# Patient Record
Sex: MALE | Race: WHITE | Employment: FULL TIME | URBAN - METROPOLITAN AREA
[De-identification: names, ages, dates, MRNs, and addresses within clinical notes are randomized per-mention and may not be internally consistent; named-entity substitution may affect disease eponyms.]

---

## 2017-04-04 ENCOUNTER — HOSPITAL ENCOUNTER (EMERGENCY)
Age: 42
Discharge: HOME | End: 2017-04-04
Attending: EMERGENCY MEDICINE
Payer: COMMERCIAL

## 2017-04-04 VITALS
DIASTOLIC BLOOD PRESSURE: 88 MMHG | RESPIRATION RATE: 17 BRPM | HEIGHT: 74 IN | WEIGHT: 240 LBS | OXYGEN SATURATION: 99 % | TEMPERATURE: 98 F | BODY MASS INDEX: 30.8 KG/M2 | SYSTOLIC BLOOD PRESSURE: 156 MMHG

## 2017-04-04 DIAGNOSIS — S00.85XA: Primary | ICD-10-CM

## 2017-04-04 PROCEDURE — 90715 TDAP VACCINE 7 YRS/> IM: CPT | Performed by: PHYSICIAN ASSISTANT

## 2017-04-04 PROCEDURE — 74011000250 HC RX REV CODE- 250: Performed by: PHYSICIAN ASSISTANT

## 2017-04-04 PROCEDURE — 90471 IMMUNIZATION ADMIN: CPT

## 2017-04-04 PROCEDURE — 99283 EMERGENCY DEPT VISIT LOW MDM: CPT

## 2017-04-04 PROCEDURE — 74011250637 HC RX REV CODE- 250/637: Performed by: PHYSICIAN ASSISTANT

## 2017-04-04 PROCEDURE — 74011250636 HC RX REV CODE- 250/636: Performed by: PHYSICIAN ASSISTANT

## 2017-04-04 RX ORDER — OXYCODONE AND ACETAMINOPHEN 5; 325 MG/1; MG/1
1 TABLET ORAL
Status: COMPLETED | OUTPATIENT
Start: 2017-04-04 | End: 2017-04-04

## 2017-04-04 RX ORDER — LIDOCAINE HYDROCHLORIDE AND EPINEPHRINE 10; 10 MG/ML; UG/ML
1.5 INJECTION, SOLUTION INFILTRATION; PERINEURAL
Status: COMPLETED | OUTPATIENT
Start: 2017-04-04 | End: 2017-04-04

## 2017-04-04 RX ORDER — CEPHALEXIN 250 MG/1
500 CAPSULE ORAL
Status: COMPLETED | OUTPATIENT
Start: 2017-04-04 | End: 2017-04-04

## 2017-04-04 RX ORDER — CEPHALEXIN 500 MG/1
500 CAPSULE ORAL 4 TIMES DAILY
Qty: 28 CAP | Refills: 0 | Status: SHIPPED | OUTPATIENT
Start: 2017-04-04 | End: 2017-04-11

## 2017-04-04 RX ADMIN — OXYCODONE HYDROCHLORIDE AND ACETAMINOPHEN 1 TABLET: 5; 325 TABLET ORAL at 19:47

## 2017-04-04 RX ADMIN — TETANUS TOXOID, REDUCED DIPHTHERIA TOXOID AND ACELLULAR PERTUSSIS VACCINE, ADSORBED 0.5 ML: 5; 2.5; 8; 8; 2.5 SUSPENSION INTRAMUSCULAR at 20:10

## 2017-04-04 RX ADMIN — LIDOCAINE HYDROCHLORIDE AND EPINEPHRINE 15 MG: 10; 10 INJECTION, SOLUTION INFILTRATION; PERINEURAL at 19:35

## 2017-04-04 RX ADMIN — CEPHALEXIN 500 MG: 250 CAPSULE ORAL at 19:47

## 2017-04-04 NOTE — ED PROVIDER NOTES
HPI Comments: Zahra Wakefield is a 39 y.o. male who presents ambulatory to ER with c/o fishhook to right cheek x PTA. Pt states he was fishing when he was trying to yank the treble hook fishhook from the bushes when it jerked back and got stuck in the right side of his face. States unable to remove it on his own. No other complaints. Td not UTD. PCP: No primary care provider on file. PMHx significant for: No past medical history on file. PSHx significant for: No past surgical history on file. No Known Allergies    There are no other complaints, changes or physical findings at this time. The history is provided by the patient. No past medical history on file. No past surgical history on file. No family history on file. Social History     Social History    Marital status: SINGLE     Spouse name: N/A    Number of children: N/A    Years of education: N/A     Occupational History    Not on file. Social History Main Topics    Smoking status: Not on file    Smokeless tobacco: Not on file    Alcohol use Not on file    Drug use: Not on file    Sexual activity: Not on file     Other Topics Concern    Not on file     Social History Narrative         ALLERGIES: Review of patient's allergies indicates no known allergies. Review of Systems   Constitutional: Negative. HENT: Negative. Eyes: Negative. Respiratory: Negative. Cardiovascular: Negative. Gastrointestinal: Negative. Genitourinary: Negative. Musculoskeletal: Negative. Skin:        FB to face   Neurological: Negative. Hematological: Negative. Psychiatric/Behavioral: Negative. All other systems reviewed and are negative. Vitals:    04/04/17 1930   BP: 156/88   Resp: 17   Temp: 98 °F (36.7 °C)   SpO2: 99%   Weight: 108.9 kg (240 lb)   Height: 6' 2\" (1.88 m)            Physical Exam   Constitutional: He is oriented to person, place, and time. He appears well-developed and well-nourished. No distress. HENT:   Head: Normocephalic and atraumatic. Neck: Normal range of motion. Cardiovascular: Intact distal pulses and normal pulses. Musculoskeletal: Normal range of motion. He exhibits no edema or tenderness. Neurological: He is alert and oriented to person, place, and time. He has normal strength. No sensory deficit. Skin: Skin is warm and dry. No rash noted. He is not diaphoretic. No erythema. No pallor. Psychiatric: He has a normal mood and affect. His behavior is normal.   Nursing note and vitals reviewed. MDM  Number of Diagnoses or Management Options  Diagnosis management comments: DDx: FB lodged, fishhook in face       Amount and/or Complexity of Data Reviewed  Discuss the patient with other providers: yes (Dr. Mary Xie)    Patient Progress  Patient progress: stable    ED Course       Procedures            7:44 PM   Rajinder Gutiérrez PA-C discussed patient with Genny Miller MD who is in agreement with care plan as outlined. No further recommendations. Rajinder Gutiérrez PA-C      Procedure Note - Removal Cutaneous Foreign Body:   7:45 PM  Performed by: Rajinder Gutiérrez PA-C   Complexity: simple  Skin prepped with safclens. Sterile field established. Anesthesia achieved with 3 mLs of Lidocaine 1% with epinephrine using a local infiltration. FB visualized just under skin located on right cheek was removed with wire cutters and traction. FB was removed in one piece without difficulty. Wound probed and irrigated. dressing applied. The procedure took 1-15 minutes, and pt tolerated well.      8:16 PM  Pt has been reevaluated. There are no new complaints, changes, or physical findings at this time. Medications have been reviewed w/ pt and/or family. Pt and/or family's questions have been answered. Pt and/or family expressed good understanding of the dx/tx/rx and is in agreement with plan of care.  Pt instructed and agreed to f/u w/ PCP and to return to ED upon further deterioration. Pt is ready for discharge. LABORATORY TESTS:  No results found for this or any previous visit (from the past 12 hour(s)). IMAGING RESULTS:  No orders to display     No results found. MEDICATIONS GIVEN:  Medications   lidocaine-EPINEPHrine (XYLOCAINE) 1 %-1:100,000 injection 15 mg (15 mg IntraDERMal Given by Provider 4/4/17 1935)   diph,Pertuss(AC),Tet Vac-PF (BOOSTRIX) suspension 0.5 mL (0.5 mL IntraMUSCular Given 4/4/17 2010)   cephALEXin (KEFLEX) capsule 500 mg (500 mg Oral Given 4/4/17 1947)   oxyCODONE-acetaminophen (PERCOCET) 5-325 mg per tablet 1 Tab (1 Tab Oral Given 4/4/17 1947)       IMPRESSION:  1. Foreign body of face, superficial, initial encounter        PLAN:  1. Current Discharge Medication List      START taking these medications    Details   cephALEXin (KEFLEX) 500 mg capsule Take 1 Cap by mouth four (4) times daily for 7 days. Qty: 28 Cap, Refills: 0           2.    Follow-up Information     Follow up With Details Comments [address] [facility] Schedule an appointment as soon as possible for a visit in 3 days  [address]    [facility] EMERGENCY DEPT  If symptoms worsen [address]  [phone]          Return to ED if worse

## 2017-04-05 NOTE — DISCHARGE INSTRUCTIONS
We hope that we have addressed all of your medical concerns. The examination and treatment you received in the Emergency Department were for an emergent problem and were not intended as complete care. It is important that you follow up with your healthcare provider(s) for ongoing care. If your symptoms worsen or do not improve as expected, and you are unable to reach your usual health care provider(s), you should return to the Emergency Department. Today's healthcare is undergoing tremendous change, and patient satisfaction surveys are one of the many tools to assess the quality of medical care. You may receive a survey from the [employer] regarding your experience in the Emergency Department. I hope that your experience has been completely positive, particularly the medical care that I provided. As such, please participate in the survey; anything less than excellent does not meet my expectations or intentions. [facility] and [address] [place] [address] participate in nationally recognized quality of care measures. If your blood pressure is greater than 120/80, as reported below, we urge that you seek medical care to address the potential of high blood pressure, commonly known as hypertension. Hypertension can be hereditary or can be caused by certain medical conditions, pain, stress, or \"white coat syndrome. \"       Please make an appointment with your health care provider(s) for follow up of your Emergency Department visit. VITALS:   Patient Vitals for the past 8 hrs:   Temp Resp BP SpO2   04/04/17 1930 98 °F (36.7 °C) 17 156/88 99 %          Thank you for allowing us to provide you with medical care today. We realize that you have many choices for your emergency care needs. Please choose us in the future for any continued health care needs. Tish Bae, [address] [place] [address] [address]   Office: [phone]            No results found for this or any previous visit (from the past 24 hour(s)). No results found. Object in the Skin: Care Instructions  Your Care Instructions  Small objects (splinters) of wood, metal, glass, or plastic can become embedded in the skin. Thorns from [employer] and other plants also can prick or become stuck in the skin. Splinters can cause an infection if they are not removed. Your doctor probably removed the object and cleaned the skin well. Your doctor may have given you antibiotics to prevent infection and a tetanus shot if you had not had one in the last 5 years or do not know when you had your last one. For a few days, you may have pain and itching in the wound where the object was removed. Follow-up care is a key part of your treatment and safety. Be sure to make and go to all appointments, and call your doctor if you are having problems. It's also a good idea to know your test results and keep a list of the medicines you take. How can you care for yourself at home? · If your doctor told you how to care for your wound, follow your doctor's instructions. If you did not get instructions, follow this general advice:  ¨ Wash the wound with clean water 2 times a day. Don't use hydrogen peroxide or alcohol, which can slow healing. ¨ You may cover the wound with a thin layer of petroleum jelly, such as Vaseline, and a nonstick bandage. ¨ Apply more petroleum jelly and replace the bandage as needed. · Your doctor may have used medicine to numb your skin. When it wears off, your pain may return. Take an over-the-counter pain medicine, such as acetaminophen (Tylenol), ibuprofen (Advil, Motrin), or naproxen (Aleve). Read and follow all instructions on the label. · Do not take two or more pain medicines at the same time unless the doctor told you to. Many pain medicines have acetaminophen, which is Tylenol. Too much acetaminophen (Tylenol) can be harmful.   · If your doctor prescribed antibiotics, take them as directed. Do not stop taking them just because you feel better. You need to take the full course of antibiotics. · After 2 or 3 days, if your swelling is gone, apply a heating pad set on low or a warm cloth to your wound area. Some doctors suggest that you go back and forth between hot and cold. Put a thin cloth between the heating pad and your skin. · It may help to prop up the affected part of your body on a pillow anytime you sit or lie down during the next 3 days. Try to keep it above the level of your heart. This will help reduce swelling. · Your wound may itch or feel irritated. A little redness and swelling is normal. Do not scratch or rub the wound. When should you call for help? Call your doctor now or seek immediate medical care if:  · The skin near the wound is cool or pale or changes color. · You have tingling, weakness, or numbness in the area near the wound. · The wound starts to bleed, and blood soaks the bandage. Oozing small amounts of blood is normal.  · You have trouble moving a limb near the wound. · You have signs of infection, such as:  ¨ Increased pain, swelling, warmth, or redness. ¨ Red streaks leading from the wound. ¨ Pus draining from the wound. ¨ A fever. Watch closely for changes in your health, and be sure to contact your doctor if:  · You do not get better as expected. Where can you learn more? Go to http://john-paul.info/. Enter Karuna Rubin in the search box to learn more about \"Object in the Skin: Care Instructions. \"  Current as of: May 27, 2016  Content Version: 11.2  © 1967-4719 [employer]. Care instructions adapted under license by [employer] (which disclaims liability or warranty for this information).  If you have questions about a medical condition or this instruction, always ask your healthcare professional. [address] any warranty or liability for your use of this information.

## 2019-03-28 ENCOUNTER — ED HISTORICAL/CONVERTED ENCOUNTER (OUTPATIENT)
Dept: OTHER | Age: 44
End: 2019-03-28

## 2023-04-16 ENCOUNTER — HOSPITAL ENCOUNTER (EMERGENCY)
Age: 48
Discharge: HOME | End: 2023-04-16
Payer: MEDICAID

## 2023-04-16 VITALS
TEMPERATURE: 97.7 F | HEART RATE: 80 BPM | WEIGHT: 298 LBS | HEIGHT: 74 IN | RESPIRATION RATE: 17 BRPM | DIASTOLIC BLOOD PRESSURE: 77 MMHG | OXYGEN SATURATION: 96 % | BODY MASS INDEX: 38.24 KG/M2 | SYSTOLIC BLOOD PRESSURE: 130 MMHG

## 2023-04-16 DIAGNOSIS — L03.115 CELLULITIS OF RIGHT LOWER EXTREMITY: Primary | ICD-10-CM

## 2023-04-16 LAB
ANION GAP SERPL CALC-SCNC: 5 MMOL/L (ref 5–15)
BASOPHILS # BLD: 0 K/UL (ref 0–0.1)
BASOPHILS NFR BLD: 0 % (ref 0–1)
BUN SERPL-MCNC: 14 MG/DL (ref 6–20)
BUN/CREAT SERPL: 13 (ref 12–20)
CA-I BLD-MCNC: 8.9 MG/DL (ref 8.5–10.1)
CHLORIDE SERPL-SCNC: 107 MMOL/L (ref 97–108)
CO2 SERPL-SCNC: 24 MMOL/L (ref 21–32)
CREAT SERPL-MCNC: 1.11 MG/DL (ref 0.7–1.3)
DIFFERENTIAL METHOD BLD: ABNORMAL
EOSINOPHIL # BLD: 0.1 K/UL (ref 0–0.4)
EOSINOPHIL NFR BLD: 1 % (ref 0–7)
ERYTHROCYTE [DISTWIDTH] IN BLOOD BY AUTOMATED COUNT: 13.4 % (ref 11.5–14.5)
GLUCOSE SERPL-MCNC: 109 MG/DL (ref 65–100)
HCT VFR BLD AUTO: 44.3 % (ref 36.6–50.3)
HGB BLD-MCNC: 15 G/DL (ref 12.1–17)
IMM GRANULOCYTES # BLD AUTO: 0.1 K/UL (ref 0–0.04)
IMM GRANULOCYTES NFR BLD AUTO: 1 % (ref 0–0.5)
LYMPHOCYTES # BLD: 1.9 K/UL (ref 0.8–3.5)
LYMPHOCYTES NFR BLD: 28 % (ref 12–49)
MCH RBC QN AUTO: 30.5 PG (ref 26–34)
MCHC RBC AUTO-ENTMCNC: 33.9 G/DL (ref 30–36.5)
MCV RBC AUTO: 90.2 FL (ref 80–99)
MONOCYTES # BLD: 0.6 K/UL (ref 0–1)
MONOCYTES NFR BLD: 9 % (ref 5–13)
NEUTS SEG # BLD: 4.2 K/UL (ref 1.8–8)
NEUTS SEG NFR BLD: 61 % (ref 32–75)
NRBC # BLD: 0 K/UL (ref 0–0.01)
NRBC BLD-RTO: 0 PER 100 WBC
PLATELET # BLD AUTO: 207 K/UL (ref 150–400)
PMV BLD AUTO: 9.6 FL (ref 8.9–12.9)
POTASSIUM SERPL-SCNC: 3.9 MMOL/L (ref 3.5–5.1)
RBC # BLD AUTO: 4.91 M/UL (ref 4.1–5.7)
SODIUM SERPL-SCNC: 136 MMOL/L (ref 136–145)
WBC # BLD AUTO: 6.8 K/UL (ref 4.1–11.1)

## 2023-04-16 PROCEDURE — 99283 EMERGENCY DEPT VISIT LOW MDM: CPT

## 2023-04-16 PROCEDURE — 36415 COLL VENOUS BLD VENIPUNCTURE: CPT

## 2023-04-16 PROCEDURE — 74011250637 HC RX REV CODE- 250/637: Performed by: NURSE PRACTITIONER

## 2023-04-16 PROCEDURE — 85025 COMPLETE CBC W/AUTO DIFF WBC: CPT

## 2023-04-16 PROCEDURE — 80048 BASIC METABOLIC PNL TOTAL CA: CPT

## 2023-04-16 RX ORDER — IBUPROFEN 800 MG/1
800 TABLET ORAL
Status: COMPLETED | OUTPATIENT
Start: 2023-04-16 | End: 2023-04-16

## 2023-04-16 RX ORDER — IBUPROFEN 800 MG/1
800 TABLET ORAL
Qty: 20 TABLET | Refills: 0 | Status: SHIPPED | OUTPATIENT
Start: 2023-04-16 | End: 2023-04-23

## 2023-04-16 RX ADMIN — IBUPROFEN 800 MG: 800 TABLET, FILM COATED ORAL at 21:51

## 2023-04-17 NOTE — DISCHARGE INSTRUCTIONS
Your blood work does not show any signs of infection. I recommend you continue your antibiotics previously prescribed. Follow-up with your primary care doctor for wound recheck and further evaluation of leg swelling and possible venous stasis   Thank you! Thank you for allowing me to care for you in the emergency department. It is my goal to provide you with excellent care. If you have not received excellent quality care, please ask to speak to the nurse manager. Please fill out the survey that will come to you by mail or email since we listen to your feedback! Below you will find a list of your tests from today's visit. Should you have any questions, please do not hesitate to call the emergency department. Labs  Recent Results (from the past 12 hour(s))   CBC WITH AUTOMATED DIFF    Collection Time: 04/16/23  9:10 PM   Result Value Ref Range    WBC 6.8 4.1 - 11.1 K/uL    RBC 4.91 4.10 - 5.70 M/uL    HGB 15.0 12.1 - 17.0 g/dL    HCT 44.3 36.6 - 50.3 %    MCV 90.2 80.0 - 99.0 FL    MCH 30.5 26.0 - 34.0 PG    MCHC 33.9 30.0 - 36.5 g/dL    RDW 13.4 11.5 - 14.5 %    PLATELET 948 158 - 774 K/uL    MPV 9.6 8.9 - 12.9 FL    NRBC 0.0 0.0  WBC    ABSOLUTE NRBC 0.00 0.00 - 0.01 K/uL    NEUTROPHILS 61 32 - 75 %    LYMPHOCYTES 28 12 - 49 %    MONOCYTES 9 5 - 13 %    EOSINOPHILS 1 0 - 7 %    BASOPHILS 0 0 - 1 %    IMMATURE GRANULOCYTES 1 (H) 0 - 0.5 %    ABS. NEUTROPHILS 4.2 1.8 - 8.0 K/UL    ABS. LYMPHOCYTES 1.9 0.8 - 3.5 K/UL    ABS. MONOCYTES 0.6 0.0 - 1.0 K/UL    ABS. EOSINOPHILS 0.1 0.0 - 0.4 K/UL    ABS. BASOPHILS 0.0 0.0 - 0.1 K/UL    ABS. IMM.  GRANS. 0.1 (H) 0.00 - 0.04 K/UL    DF AUTOMATED     METABOLIC PANEL, BASIC    Collection Time: 04/16/23  9:10 PM   Result Value Ref Range    Sodium 136 136 - 145 mmol/L    Potassium 3.9 3.5 - 5.1 mmol/L    Chloride 107 97 - 108 mmol/L    CO2 24 21 - 32 mmol/L    Anion gap 5 5 - 15 mmol/L    Glucose 109 (H) 65 - 100 mg/dL    BUN 14 6 - 20 mg/dL    Creatinine 1.11 0.70 - 1.30 mg/dL    BUN/Creatinine ratio 13 12 - 20      eGFR >60 >60 ml/min/1.73m2    Calcium 8.9 8.5 - 10.1 mg/dL       Radiologic Studies  No orders to display     CT Results  (Last 48 hours)      None          CXR Results  (Last 48 hours)      None          ------------------------------------------------------------------------------------------------------------  The exam and treatment you received in the Emergency Department were for an urgent problem and are not intended as complete care. It is important that you follow-up with a doctor, nurse practitioner, or physician assistant to:  (1) confirm your diagnosis,  (2) re-evaluation of changes in your illness and treatment, and  (3) for ongoing care. Please take your discharge instructions with you when you go to your follow-up appointment. If you have any problem arranging a follow-up appointment, contact the Emergency Department. If your symptoms become worse or you do not improve as expected and you are unable to reach your health care provider, please return to the Emergency Department. We are available 24 hours a day. If a prescription has been provided, please have it filled as soon as possible to prevent a delay in treatment. If you have any questions or reservations about taking the medication due to side effects or interactions with other medications, please call your primary care provider or contact the ER.

## 2023-04-22 NOTE — ED PROVIDER NOTES
[facility] EMERGENCY DEPT  EMERGENCY DEPARTMENT HISTORY AND PHYSICAL EXAM      Date: 4/16/2023  Patient Name: Sisi Tate  MRN: [id number]  [place]: 1975  Date of evaluation: 4/16/2023  Provider: Poppy Floyd NP   Note Started: 6:09 PM 4/22/23    HISTORY OF PRESENT ILLNESS     Chief Complaint   Patient presents with    Ankle swelling       History Provided By: Patient    HPI: Sisi Tate is a 50 y.o. male with no significant past medical history presents to the emergency room with CC of  skin infection. PAST MEDICAL HISTORY   Past Medical History:  No past medical history on file. Past Surgical History:  No past surgical history on file. Family History:  No family history on file. Social History:  Social History     Tobacco Use    Smoking status: Never   Substance Use Topics    Alcohol use: No       Allergies:  No Known Allergies    PCP: None    Current Meds:   Discharge Medication List as of 4/16/2023  9:50 PM          PHYSICAL EXAM     ED Triage Vitals   ED Encounter Vitals Group      BP 04/16/23 1949 124/82      Pulse (Heart Rate) 04/16/23 1949 80      Resp Rate 04/16/23 1949 17      Temp 04/16/23 1949 97.7 °F (36.5 °C)      Temp src --       O2 Sat (%) 04/16/23 1949 96 %      Weight 04/16/23 1949 298 lb      Height 04/16/23 1949 6' 2\"      Physical Exam  ***    SCREENINGS              LAB, EKG AND DIAGNOSTIC RESULTS   Labs:  No results found for this or any previous visit (from the past 12 hour(s)). EKG: Initial EKG interpreted by me. {EKG Smartlist:34589}    Radiologic Studies:  Non-plain film images such as CT, Ultrasound and MRI are read by the radiologist. Plain radiographic images are visualized and preliminarily interpreted by the ED Physician with the following findings: {Wet Read Interpretation:53058}    Interpretation per the Radiologist below, if available at the time of this note:  No results found. PROCEDURES   Unless otherwise noted below, none.   Procedures      CRITICAL CARE TIME   {Critical Care Smartlist:09290}    ED COURSE and DIFFERENTIAL DIAGNOSIS/MDM   CC/HPI/PE Summary, DDx: ***    Records Reviewed (source and summary of external notes): Prior medical records and Nursing notes    Vitals:    Vitals:    04/16/23 1949 04/16/23 1949 04/16/23 2152   BP:  124/82 130/77   Pulse:  80    Resp:  17    Temp:  97.7 °F (36.5 °C)    SpO2:  96%    Weight: 135.2 kg (298 lb)     Height: 6' 2\" (1.88 m)          ED COURSE  ED Course as of 04/22/23 1809   Sun Apr 16, 2023 2126 CBC normal, no leukocytosis or anemia [LP]   2138 BMP stable. [LP]      ED Course User Index  [LP] Giovanna Benjamin NP       Disposition Considerations (Tests not done, Shared Decision Making, Pt Expectation of Test or Treatment.): {Dispo consideration:96203}    Patient was given the following medications:  Medications   ibuprofen (MOTRIN) tablet 800 mg (800 mg Oral Given 4/16/23 2151)       CONSULTS: (Who and What was discussed)  None     Social Determinants affecting Dx or Tx: {Social Barriers:13580}    Smoking Cessation: {smoking cessation smartlist:27000}    FINAL IMPRESSION     1. Cellulitis of right lower extremity          DISPOSITION/PLAN   Discharged    {Disposition:84298}     PATIENT REFERRED TO:  Follow-up Information       Follow up With Specialties Details Why [address] [place] [address] [address]  Schedule an appointment as soon as possible for a visit  OR your PCP, For wound re-check [address] [place] [address]  [phone]              DISCHARGE MEDICATIONS:  Discharge Medication List as of 4/16/2023  9:50 PM            DISCONTINUED MEDICATIONS:  Discharge Medication List as of 4/16/2023  9:50 PM          I am the Primary Clinician of Record: Karen Varela NP (electronically signed)    (Please note that parts of this dictation were completed with voice recognition software.  Quite often unanticipated grammatical, syntax, homophones, and other interpretive errors are inadvertently transcribed by the computer software. Please disregards these errors.  Please excuse any errors that have escaped final proofreading.)

## 2023-05-17 ENCOUNTER — APPOINTMENT (OUTPATIENT)
Facility: HOSPITAL | Age: 48
End: 2023-05-17
Payer: MEDICAID

## 2023-05-17 ENCOUNTER — HOSPITAL ENCOUNTER (EMERGENCY)
Facility: HOSPITAL | Age: 48
Discharge: HOME | End: 2023-05-17
Attending: EMERGENCY MEDICINE
Payer: MEDICAID

## 2023-05-17 VITALS
BODY MASS INDEX: 38.63 KG/M2 | RESPIRATION RATE: 15 BRPM | OXYGEN SATURATION: 98 % | HEART RATE: 77 BPM | SYSTOLIC BLOOD PRESSURE: 150 MMHG | TEMPERATURE: 98 F | DIASTOLIC BLOOD PRESSURE: 103 MMHG | WEIGHT: 301 LBS | HEIGHT: 74 IN

## 2023-05-17 DIAGNOSIS — M79.89 LEG SWELLING: ICD-10-CM

## 2023-05-17 DIAGNOSIS — R07.89 OTHER CHEST PAIN: Primary | ICD-10-CM

## 2023-05-17 LAB
ALBUMIN SERPL-MCNC: 4.1 G/DL (ref 3.5–5)
ALBUMIN/GLOB SERPL: 1.1 (ref 1.1–2.2)
ALP SERPL-CCNC: 85 U/L (ref 45–117)
ALT SERPL-CCNC: 43 U/L (ref 12–78)
ANION GAP SERPL CALC-SCNC: 4 MMOL/L (ref 5–15)
AST SERPL W P-5'-P-CCNC: 18 U/L (ref 15–37)
BASOPHILS # BLD: 0 K/UL (ref 0–0.1)
BASOPHILS NFR BLD: 1 % (ref 0–1)
BILIRUB SERPL-MCNC: 0.9 MG/DL (ref 0.2–1)
BNP SERPL-MCNC: 17 PG/ML
BUN SERPL-MCNC: 15 MG/DL (ref 6–20)
BUN/CREAT SERPL: 18 (ref 12–20)
CA-I BLD-MCNC: 8.9 MG/DL (ref 8.5–10.1)
CHLORIDE SERPL-SCNC: 108 MMOL/L (ref 97–108)
CO2 SERPL-SCNC: 26 MMOL/L (ref 21–32)
CREAT SERPL-MCNC: 0.84 MG/DL (ref 0.7–1.3)
DIFFERENTIAL METHOD BLD: ABNORMAL
EKG ATRIAL RATE: 78 BPM
EKG DIAGNOSIS: NORMAL
EKG P AXIS: 22 DEGREES
EKG P-R INTERVAL: 150 MS
EKG Q-T INTERVAL: 364 MS
EKG QRS DURATION: 84 MS
EKG QTC CALCULATION (BAZETT): 414 MS
EKG R AXIS: -5 DEGREES
EKG T AXIS: 29 DEGREES
EKG VENTRICULAR RATE: 78 BPM
EOSINOPHIL # BLD: 0.1 K/UL (ref 0–0.4)
EOSINOPHIL NFR BLD: 1 % (ref 0–7)
ERYTHROCYTE [DISTWIDTH] IN BLOOD BY AUTOMATED COUNT: 13.6 % (ref 11.5–14.5)
GLOBULIN SER CALC-MCNC: 3.9 G/DL (ref 2–4)
GLUCOSE SERPL-MCNC: 98 MG/DL (ref 65–100)
HCT VFR BLD AUTO: 45.6 % (ref 36.6–50.3)
HGB BLD-MCNC: 15.7 G/DL (ref 12.1–17)
IMM GRANULOCYTES # BLD AUTO: 0.1 K/UL (ref 0–0.04)
IMM GRANULOCYTES NFR BLD AUTO: 1 % (ref 0–0.5)
LYMPHOCYTES # BLD: 1.9 K/UL (ref 0.8–3.5)
LYMPHOCYTES NFR BLD: 29 % (ref 12–49)
MCH RBC QN AUTO: 30.9 PG (ref 26–34)
MCHC RBC AUTO-ENTMCNC: 34.4 G/DL (ref 30–36.5)
MCV RBC AUTO: 89.8 FL (ref 80–99)
MONOCYTES # BLD: 0.6 K/UL (ref 0–1)
MONOCYTES NFR BLD: 9 % (ref 5–13)
NEUTS SEG # BLD: 4 K/UL (ref 1.8–8)
NEUTS SEG NFR BLD: 59 % (ref 32–75)
NRBC # BLD: 0 K/UL (ref 0–0.01)
NRBC BLD-RTO: 0 PER 100 WBC
PLATELET # BLD AUTO: 215 K/UL (ref 150–400)
PMV BLD AUTO: 9.6 FL (ref 8.9–12.9)
POTASSIUM SERPL-SCNC: 4.2 MMOL/L (ref 3.5–5.1)
PROT SERPL-MCNC: 8 G/DL (ref 6.4–8.2)
RBC # BLD AUTO: 5.08 M/UL (ref 4.1–5.7)
SODIUM SERPL-SCNC: 138 MMOL/L (ref 136–145)
TROPONIN I SERPL HS-MCNC: 5 NG/L (ref 0–76)
WBC # BLD AUTO: 6.6 K/UL (ref 4.1–11.1)

## 2023-05-17 PROCEDURE — 99285 EMERGENCY DEPT VISIT HI MDM: CPT

## 2023-05-17 PROCEDURE — 84484 ASSAY OF TROPONIN QUANT: CPT

## 2023-05-17 PROCEDURE — 83880 ASSAY OF NATRIURETIC PEPTIDE: CPT

## 2023-05-17 PROCEDURE — 36415 COLL VENOUS BLD VENIPUNCTURE: CPT

## 2023-05-17 PROCEDURE — 71045 X-RAY EXAM CHEST 1 VIEW: CPT

## 2023-05-17 PROCEDURE — 85025 COMPLETE CBC W/AUTO DIFF WBC: CPT

## 2023-05-17 PROCEDURE — 93005 ELECTROCARDIOGRAM TRACING: CPT | Performed by: EMERGENCY MEDICINE

## 2023-05-17 PROCEDURE — 80053 COMPREHEN METABOLIC PANEL: CPT

## 2023-05-17 ASSESSMENT — LIFESTYLE VARIABLES
HOW OFTEN DO YOU HAVE A DRINK CONTAINING ALCOHOL: MONTHLY OR LESS
HOW MANY STANDARD DRINKS CONTAINING ALCOHOL DO YOU HAVE ON A TYPICAL DAY: 3 OR 4

## 2023-05-17 ASSESSMENT — PAIN SCALES - GENERAL: PAINLEVEL_OUTOF10: 0

## 2023-05-17 NOTE — ED TRIAGE NOTES
Pt states he's been having off and on chest pains, none currently, for a few days. Increased edema to bilateral lower legs that he states is new.

## 2023-05-24 NOTE — ED PROVIDER NOTES
for discharge home. The signs, symptoms, diagnosis, and discharge instructions have been discussed, understanding conveyed, and agreed upon. The patient is to follow up as recommended or return to ER should their symptoms worsen. PATIENT REFERRED TO:  Leslie Soni MD  [address] [place] [address]    Schedule an appointment as soon as possible for a visit           DISCHARGE MEDICATIONS:     Medication List      You have not been prescribed any medications. DISCONTINUED MEDICATIONS:  There are no discharge medications for this patient. I am the Primary Clinician of Record: Tosin Nielson MD (electronically signed)    (Please note that parts of this dictation were completed with voice recognition software. Quite often unanticipated grammatical, syntax, homophones, and other interpretive errors are inadvertently transcribed by the computer software. Please disregards these errors.  Please excuse any errors that have escaped final proofreading.)     Tosin Nielson MD  05/24/23 8642

## 2023-08-09 ENCOUNTER — HOSPITAL ENCOUNTER (EMERGENCY)
Facility: HOSPITAL | Age: 48
Discharge: HOME | End: 2023-08-09
Attending: EMERGENCY MEDICINE
Payer: MEDICAID

## 2023-08-09 VITALS
TEMPERATURE: 98.3 F | RESPIRATION RATE: 18 BRPM | DIASTOLIC BLOOD PRESSURE: 89 MMHG | WEIGHT: 290 LBS | BODY MASS INDEX: 37.22 KG/M2 | SYSTOLIC BLOOD PRESSURE: 157 MMHG | HEART RATE: 80 BPM | HEIGHT: 74 IN | OXYGEN SATURATION: 96 %

## 2023-08-09 DIAGNOSIS — K42.9 UMBILICAL HERNIA WITHOUT OBSTRUCTION AND WITHOUT GANGRENE: Primary | ICD-10-CM

## 2023-08-09 LAB
ALBUMIN SERPL-MCNC: 4.1 G/DL (ref 3.5–5)
ALBUMIN/GLOB SERPL: 1 (ref 1.1–2.2)
ALP SERPL-CCNC: 92 U/L (ref 45–117)
ALT SERPL-CCNC: 57 U/L (ref 12–78)
ANION GAP SERPL CALC-SCNC: 4 MMOL/L (ref 5–15)
AST SERPL W P-5'-P-CCNC: 23 U/L (ref 15–37)
BASOPHILS # BLD: 0 K/UL (ref 0–0.1)
BASOPHILS NFR BLD: 1 % (ref 0–1)
BILIRUB SERPL-MCNC: 1 MG/DL (ref 0.2–1)
BUN SERPL-MCNC: 15 MG/DL (ref 6–20)
BUN/CREAT SERPL: 16 (ref 12–20)
CA-I BLD-MCNC: 9.1 MG/DL (ref 8.5–10.1)
CHLORIDE SERPL-SCNC: 108 MMOL/L (ref 97–108)
CO2 SERPL-SCNC: 26 MMOL/L (ref 21–32)
CREAT SERPL-MCNC: 0.94 MG/DL (ref 0.7–1.3)
DIFFERENTIAL METHOD BLD: ABNORMAL
EOSINOPHIL # BLD: 0.1 K/UL (ref 0–0.4)
EOSINOPHIL NFR BLD: 1 % (ref 0–7)
ERYTHROCYTE [DISTWIDTH] IN BLOOD BY AUTOMATED COUNT: 13.1 % (ref 11.5–14.5)
GLOBULIN SER CALC-MCNC: 4.2 G/DL (ref 2–4)
GLUCOSE SERPL-MCNC: 96 MG/DL (ref 65–100)
HCT VFR BLD AUTO: 47.3 % (ref 36.6–50.3)
HGB BLD-MCNC: 15.9 G/DL (ref 12.1–17)
IMM GRANULOCYTES # BLD AUTO: 0.1 K/UL (ref 0–0.04)
IMM GRANULOCYTES NFR BLD AUTO: 1 % (ref 0–0.5)
LYMPHOCYTES # BLD: 1.8 K/UL (ref 0.8–3.5)
LYMPHOCYTES NFR BLD: 22 % (ref 12–49)
MCH RBC QN AUTO: 30.3 PG (ref 26–34)
MCHC RBC AUTO-ENTMCNC: 33.6 G/DL (ref 30–36.5)
MCV RBC AUTO: 90.3 FL (ref 80–99)
MONOCYTES # BLD: 0.7 K/UL (ref 0–1)
MONOCYTES NFR BLD: 9 % (ref 5–13)
NEUTS SEG # BLD: 5.8 K/UL (ref 1.8–8)
NEUTS SEG NFR BLD: 66 % (ref 32–75)
NRBC # BLD: 0 K/UL (ref 0–0.01)
NRBC BLD-RTO: 0 PER 100 WBC
PLATELET # BLD AUTO: 223 K/UL (ref 150–400)
PMV BLD AUTO: 9.5 FL (ref 8.9–12.9)
POTASSIUM SERPL-SCNC: 4.1 MMOL/L (ref 3.5–5.1)
PROT SERPL-MCNC: 8.3 G/DL (ref 6.4–8.2)
RBC # BLD AUTO: 5.24 M/UL (ref 4.1–5.7)
SODIUM SERPL-SCNC: 138 MMOL/L (ref 136–145)
WBC # BLD AUTO: 8.5 K/UL (ref 4.1–11.1)

## 2023-08-09 PROCEDURE — 6360000002 HC RX W HCPCS: Performed by: EMERGENCY MEDICINE

## 2023-08-09 PROCEDURE — 80053 COMPREHEN METABOLIC PANEL: CPT

## 2023-08-09 PROCEDURE — 85025 COMPLETE CBC W/AUTO DIFF WBC: CPT

## 2023-08-09 PROCEDURE — 96374 THER/PROPH/DIAG INJ IV PUSH: CPT

## 2023-08-09 PROCEDURE — 36415 COLL VENOUS BLD VENIPUNCTURE: CPT

## 2023-08-09 PROCEDURE — 99284 EMERGENCY DEPT VISIT MOD MDM: CPT

## 2023-08-09 RX ORDER — NAPROXEN 500 MG/1
500 TABLET ORAL 2 TIMES DAILY
Qty: 60 TABLET | Refills: 0 | Status: SHIPPED | OUTPATIENT
Start: 2023-08-09

## 2023-08-09 RX ORDER — MIDAZOLAM HYDROCHLORIDE 2 MG/2ML
5 INJECTION, SOLUTION INTRAMUSCULAR; INTRAVENOUS ONCE
Status: COMPLETED | OUTPATIENT
Start: 2023-08-09 | End: 2023-08-09

## 2023-08-09 RX ADMIN — MIDAZOLAM HYDROCHLORIDE 5 MG: 1 INJECTION, SOLUTION INTRAMUSCULAR; INTRAVENOUS at 18:04

## 2023-08-09 ASSESSMENT — PAIN - FUNCTIONAL ASSESSMENT: PAIN_FUNCTIONAL_ASSESSMENT: 0-10

## 2023-08-09 NOTE — ED PROVIDER NOTES
[facility] EMERGENCY DEPT  EMERGENCY DEPARTMENT HISTORY AND PHYSICAL EXAM      Date: 8/9/2023  Patient Name: Patsy Bowen  MRN: [id number]  [address] [facility] 1975  Date of evaluation: 8/9/2023  Provider: Yanira Salazar MD   Note Started: 4:44 PM EDT 8/9/23    HISTORY OF PRESENT ILLNESS     Chief Complaint   Patient presents with    Abdominal Pain       History Provided By: Patient    HPI: Patsy Bowen is a 50 y.o. male who complains of periumbilical swelling and pain that began acutely yesterday. This is made worse with movement and deep inspiration. He denies any fever, chills, nausea, vomiting, chest pain, shortness of breath, rash, diarrhea. Says he has never had a hernia in the past and no abdominal surgeries in the past.    PAST MEDICAL HISTORY   Past Medical History:  History reviewed. No pertinent past medical history. Past Surgical History:  Past Surgical History:   Procedure Laterality Date    HAND RECONSTRUCTION Left        Family History:  History reviewed. No pertinent family history. Social History:  Social History     Tobacco Use    Smoking status: Never   Substance Use Topics    Alcohol use: No    Drug use: Never       Allergies:  No Known Allergies    PCP: No primary care provider on file. Current Meds:   No current facility-administered medications for this encounter.      Current Outpatient Medications   Medication Sig Dispense Refill    naproxen (NAPROSYN) 500 MG tablet Take 1 tablet by mouth 2 times daily 60 tablet 0       Social Determinants of Health:   Social Determinants of Health     Tobacco Use: Unknown    Smoking Tobacco Use: Never    Smokeless Tobacco Use: Unknown    Passive Exposure: Not on file   Alcohol Use: Not At Risk    Frequency of Alcohol Consumption: Monthly or less    Average Number of Drinks: 3 or 4    Frequency of Binge Drinking: Less than monthly   Financial Resource Strain: Not on file   Food Insecurity: Not on file   Transportation Needs: Not on file   Physical Activity:

## 2023-08-09 NOTE — ED TRIAGE NOTES
Ate pork ribs yesterday and then did not feel well - progressed to extreme pain and swelling to umbilical area. Swollen area to navel noted.

## 2023-08-24 ENCOUNTER — HOSPITAL ENCOUNTER (EMERGENCY)
Facility: HOSPITAL | Age: 48
Discharge: HOME | End: 2023-08-24
Attending: STUDENT IN AN ORGANIZED HEALTH CARE EDUCATION/TRAINING PROGRAM
Payer: MEDICAID

## 2023-08-24 VITALS
OXYGEN SATURATION: 96 % | HEIGHT: 74 IN | SYSTOLIC BLOOD PRESSURE: 126 MMHG | RESPIRATION RATE: 18 BRPM | DIASTOLIC BLOOD PRESSURE: 86 MMHG | HEART RATE: 76 BPM | BODY MASS INDEX: 36.57 KG/M2 | WEIGHT: 285 LBS | TEMPERATURE: 97.9 F

## 2023-08-24 DIAGNOSIS — K42.9 UMBILICAL HERNIA WITHOUT OBSTRUCTION AND WITHOUT GANGRENE: Primary | ICD-10-CM

## 2023-08-24 PROCEDURE — 99282 EMERGENCY DEPT VISIT SF MDM: CPT

## 2023-08-24 ASSESSMENT — PAIN - FUNCTIONAL ASSESSMENT: PAIN_FUNCTIONAL_ASSESSMENT: NONE - DENIES PAIN

## 2023-08-24 ASSESSMENT — LIFESTYLE VARIABLES
HOW OFTEN DO YOU HAVE A DRINK CONTAINING ALCOHOL: NEVER
HOW MANY STANDARD DRINKS CONTAINING ALCOHOL DO YOU HAVE ON A TYPICAL DAY: PATIENT DOES NOT DRINK

## 2023-08-24 NOTE — ED PROVIDER NOTES
distress. His abdominal exam did not reveal any hernia or any tenderness. We discussed outpatient follow-up with general surgery for medical clearance given that he is a  and would be at high risk of recurrence of hernia. He was given resources to establish care with general surgery as well as primary care. ED FINAL IMPRESSION     1. Umbilical hernia without obstruction and without gangrene          DISPOSITION/PLAN   DISPOSITION Decision To Discharge 08/24/2023 04:21:02 PM    Discharge Note: The patient is stable for discharge home. The signs, symptoms, diagnosis, and discharge instructions have been discussed, understanding conveyed, and agreed upon. The patient is to follow up as recommended or return to ER should their symptoms worsen. PATIENT REFERRED TO:  [facility] EMERGENCY DEPT  [address]  [phone]  Go to   As needed, If symptoms worsen    Ida Neither Gianna, DO  [address] [facility]  [phone]    Schedule an appointment as soon as possible for a visit in 3 days  For follow-up, For reevaluation    SARY Dangelo NP  [facility] [address]  [phone]    Call in 1 day          DISCHARGE MEDICATIONS:     Medication List        ASK your doctor about these medications      naproxen 500 MG tablet  Commonly known as: Naprosyn  Take 1 tablet by mouth 2 times daily                DISCONTINUED MEDICATIONS:  Discharge Medication List as of 8/24/2023  4:47 PM          I am the Primary Clinician of Record. Ena Gonzalez MD (electronically signed)    (Please note that parts of this dictation were completed with voice recognition software. Quite often unanticipated grammatical, syntax, homophones, and other interpretive errors are inadvertently transcribed by the computer software. Please disregards these errors.  Please excuse any errors that have escaped final proofreading.)

## 2023-08-24 NOTE — ED TRIAGE NOTES
States dx with hernia and was told to follow up with surgeon but was unable to. Wants to check up on it.

## 2023-08-24 NOTE — ED NOTES
Provider at bedside for dispo and follow up, all treatments completed as ordered. Discharge plan reviewed and paperwork signed, teaching completed regarding treatment received, medications and follow up care demonstrated and read back, pt walked out of the ER with steady gait and no signs of acute distress.        Alirio Park RN  08/24/23 0840

## 2023-08-28 ENCOUNTER — OFFICE VISIT (OUTPATIENT)
Age: 48
End: 2023-08-28
Payer: MEDICAID

## 2023-08-28 VITALS
DIASTOLIC BLOOD PRESSURE: 85 MMHG | WEIGHT: 310 LBS | HEIGHT: 74 IN | BODY MASS INDEX: 39.78 KG/M2 | RESPIRATION RATE: 18 BRPM | SYSTOLIC BLOOD PRESSURE: 135 MMHG

## 2023-08-28 DIAGNOSIS — K42.9 UMBILICAL HERNIA WITHOUT OBSTRUCTION AND WITHOUT GANGRENE: Primary | ICD-10-CM

## 2023-08-28 PROCEDURE — 99204 OFFICE O/P NEW MOD 45 MIN: CPT | Performed by: SURGERY

## 2023-08-28 NOTE — PROGRESS NOTES
Burning, cramping at umbilicus  No nausea or vomiting  Tender to palpation    Some heavy lifting with work  Was on light duty    10/25    [address] [facility] Gianna, DO  [address] [facility] [address], [address] [place] [address]  [phone]      Patient Name: Zoran Patton (50 y.o., male)    PCP: No primary care provider on file. History of Present Illness  Patient is a 50 y.o. male who presents to the office for evaluation of umbilical pain. He describes it as a burning, cramping pain. He has not noticed any bulging, but does state that it is tender to palpation. He does do intermittent heavy lifting at work. He has been on light duty since this pain began. He has not had any imaging done. History reviewed. No pertinent past medical history. Past Surgical History:   Procedure Laterality Date    ANTERIOR CRUCIATE LIGAMENT REPAIR Left 2000    HAND RECONSTRUCTION Left        Family History   Problem Relation Age of Onset    Cancer Father        Social History     Tobacco Use    Smoking status: Never   Substance Use Topics    Alcohol use: No    Drug use: Never       No Known Allergies    Prior to Visit Medications    Medication Sig Taking? Authorizing Provider   naproxen (NAPROSYN) 500 MG tablet Take 1 tablet by mouth 2 times daily Yes Himanshu Peña MD       Review of Systems   Constitutional:  Negative for chills, fever and unexpected weight change. HENT:  Negative for congestion, ear pain and sore throat. Eyes:  Negative for redness and visual disturbance. Respiratory:  Negative for cough, shortness of breath and wheezing. Cardiovascular:  Negative for chest pain and palpitations. Gastrointestinal:  Positive for abdominal pain. Negative for nausea and vomiting.        +umbilical pain   Genitourinary:  Negative for dysuria, frequency and hematuria. Musculoskeletal:  Negative for back pain, myalgias and neck pain. Skin:  Negative for rash and wound.    Neurological:

## 2023-09-04 ASSESSMENT — ENCOUNTER SYMPTOMS
VOMITING: 0
EYE REDNESS: 0
SORE THROAT: 0
WHEEZING: 0
SHORTNESS OF BREATH: 0
COUGH: 0
ABDOMINAL PAIN: 1
BACK PAIN: 0
NAUSEA: 0

## 2023-09-21 ENCOUNTER — TELEPHONE (OUTPATIENT)
Age: 48
End: 2023-09-21

## 2023-09-21 NOTE — TELEPHONE ENCOUNTER
Spoke to patient regarding scheduling for surgery on 10/25 with Dr. Carey Katz . Patient agreed. Informed patient that everything will be sent in surgical letter via MyChart and mail as well.

## 2023-09-25 ENCOUNTER — PREP FOR PROCEDURE (OUTPATIENT)
Facility: HOSPITAL | Age: 48
End: 2023-09-25

## 2023-09-25 RX ORDER — SODIUM CHLORIDE 0.9 % (FLUSH) 0.9 %
5-40 SYRINGE (ML) INJECTION PRN
Status: CANCELLED | OUTPATIENT
Start: 2023-09-25

## 2023-09-25 RX ORDER — ACETAMINOPHEN 500 MG
1000 TABLET ORAL ONCE
Status: CANCELLED | OUTPATIENT
Start: 2023-09-25 | End: 2023-09-25

## 2023-09-25 RX ORDER — SODIUM CHLORIDE, SODIUM LACTATE, POTASSIUM CHLORIDE, CALCIUM CHLORIDE 600; 310; 30; 20 MG/100ML; MG/100ML; MG/100ML; MG/100ML
INJECTION, SOLUTION INTRAVENOUS CONTINUOUS
Status: CANCELLED | OUTPATIENT
Start: 2023-09-25

## 2023-09-25 RX ORDER — PREGABALIN 100 MG/1
100 CAPSULE ORAL ONCE
Status: CANCELLED | OUTPATIENT
Start: 2023-09-25 | End: 2023-09-25

## 2023-09-25 RX ORDER — SODIUM CHLORIDE 0.9 % (FLUSH) 0.9 %
5-40 SYRINGE (ML) INJECTION EVERY 12 HOURS SCHEDULED
Status: CANCELLED | OUTPATIENT
Start: 2023-09-25

## 2023-09-25 RX ORDER — SODIUM CHLORIDE 9 MG/ML
INJECTION, SOLUTION INTRAVENOUS PRN
Status: CANCELLED | OUTPATIENT
Start: 2023-09-25

## 2023-09-25 RX ORDER — CELECOXIB 200 MG/1
200 CAPSULE ORAL ONCE
Status: CANCELLED | OUTPATIENT
Start: 2023-09-25 | End: 2023-09-25

## 2023-10-10 ENCOUNTER — APPOINTMENT (OUTPATIENT)
Facility: HOSPITAL | Age: 48
DRG: 446 | End: 2023-10-10
Payer: MEDICAID

## 2023-10-10 ENCOUNTER — APPOINTMENT (OUTPATIENT)
Facility: HOSPITAL | Age: 48
DRG: 446 | End: 2023-10-10
Attending: EMERGENCY MEDICINE
Payer: MEDICAID

## 2023-10-10 ENCOUNTER — HOSPITAL ENCOUNTER (INPATIENT)
Facility: HOSPITAL | Age: 48
LOS: 10 days | Discharge: HOME | DRG: 446 | End: 2023-10-20
Attending: EMERGENCY MEDICINE | Admitting: INTERNAL MEDICINE
Payer: MEDICAID

## 2023-10-10 DIAGNOSIS — D72.829 LEUKOCYTOSIS, UNSPECIFIED TYPE: Primary | ICD-10-CM

## 2023-10-10 DIAGNOSIS — K42.9 UMBILICAL HERNIA WITHOUT OBSTRUCTION AND WITHOUT GANGRENE: ICD-10-CM

## 2023-10-10 DIAGNOSIS — D49.4 BLADDER TUMOR: ICD-10-CM

## 2023-10-10 DIAGNOSIS — L03.115 CELLULITIS OF RIGHT LOWER EXTREMITY: ICD-10-CM

## 2023-10-10 DIAGNOSIS — I42.9 CARDIOMYOPATHY, UNSPECIFIED TYPE (HCC): ICD-10-CM

## 2023-10-10 DIAGNOSIS — R31.9 HEMATURIA, UNSPECIFIED TYPE: ICD-10-CM

## 2023-10-10 PROBLEM — R65.10 SIRS (SYSTEMIC INFLAMMATORY RESPONSE SYNDROME) (HCC): Status: ACTIVE | Noted: 2023-10-10

## 2023-10-10 LAB
ALBUMIN SERPL-MCNC: 3.9 G/DL (ref 3.5–5)
ALBUMIN/GLOB SERPL: 0.9 (ref 1.1–2.2)
ALP SERPL-CCNC: 86 U/L (ref 45–117)
ALT SERPL-CCNC: 70 U/L (ref 12–78)
ANION GAP SERPL CALC-SCNC: 9 MMOL/L (ref 5–15)
APPEARANCE UR: CLEAR
AST SERPL W P-5'-P-CCNC: 30 U/L (ref 15–37)
BACTERIA URNS QL MICRO: NEGATIVE /HPF
BASOPHILS # BLD: 0 K/UL (ref 0–0.1)
BASOPHILS NFR BLD: 0 % (ref 0–1)
BILIRUB SERPL-MCNC: 1.5 MG/DL (ref 0.2–1)
BILIRUB UR QL: NEGATIVE
BUN SERPL-MCNC: 15 MG/DL (ref 6–20)
BUN/CREAT SERPL: 13 (ref 12–20)
CA-I BLD-MCNC: 9 MG/DL (ref 8.5–10.1)
CHLORIDE SERPL-SCNC: 103 MMOL/L (ref 97–108)
CO2 SERPL-SCNC: 26 MMOL/L (ref 21–32)
COLOR UR: ABNORMAL
CREAT SERPL-MCNC: 1.15 MG/DL (ref 0.7–1.3)
DIFFERENTIAL METHOD BLD: ABNORMAL
EOSINOPHIL # BLD: 0 K/UL (ref 0–0.4)
EOSINOPHIL NFR BLD: 0 % (ref 0–7)
EPITH CASTS URNS QL MICRO: ABNORMAL /LPF
ERYTHROCYTE [DISTWIDTH] IN BLOOD BY AUTOMATED COUNT: 13.7 % (ref 11.5–14.5)
FLUAV AG NPH QL IA: NEGATIVE
FLUBV AG NOSE QL IA: NEGATIVE
GLOBULIN SER CALC-MCNC: 4.2 G/DL (ref 2–4)
GLUCOSE SERPL-MCNC: 96 MG/DL (ref 65–100)
GLUCOSE UR STRIP.AUTO-MCNC: NEGATIVE MG/DL
HCT VFR BLD AUTO: 44.2 % (ref 36.6–50.3)
HGB BLD-MCNC: 14.6 G/DL (ref 12.1–17)
HGB UR QL STRIP: ABNORMAL
IMM GRANULOCYTES # BLD AUTO: 0.2 K/UL (ref 0–0.04)
IMM GRANULOCYTES NFR BLD AUTO: 1 % (ref 0–0.5)
KETONES UR QL STRIP.AUTO: NEGATIVE MG/DL
LACTATE BLD-SCNC: 1.61 MMOL/L (ref 0.4–2)
LEUKOCYTE ESTERASE UR QL STRIP.AUTO: NEGATIVE
LYMPHOCYTES # BLD: 0.6 K/UL (ref 0.8–3.5)
LYMPHOCYTES NFR BLD: 3 % (ref 12–49)
MCH RBC QN AUTO: 30.5 PG (ref 26–34)
MCHC RBC AUTO-ENTMCNC: 33 G/DL (ref 30–36.5)
MCV RBC AUTO: 92.3 FL (ref 80–99)
MONOCYTES # BLD: 1 K/UL (ref 0–1)
MONOCYTES NFR BLD: 5 % (ref 5–13)
MUCOUS THREADS URNS QL MICRO: ABNORMAL /LPF
NEUTS SEG # BLD: 16.6 K/UL (ref 1.8–8)
NEUTS SEG NFR BLD: 91 % (ref 32–75)
NITRITE UR QL STRIP.AUTO: NEGATIVE
NRBC # BLD: 0 K/UL (ref 0–0.01)
NRBC BLD-RTO: 0 PER 100 WBC
PERFORMED BY:: NORMAL
PH UR STRIP: >8.5 [PH] (ref 5–8)
PLATELET # BLD AUTO: 189 K/UL (ref 150–400)
PMV BLD AUTO: 10.1 FL (ref 8.9–12.9)
POTASSIUM SERPL-SCNC: 3.9 MMOL/L (ref 3.5–5.1)
PROT SERPL-MCNC: 8.1 G/DL (ref 6.4–8.2)
PROT UR STRIP-MCNC: NEGATIVE MG/DL
RBC # BLD AUTO: 4.79 M/UL (ref 4.1–5.7)
RBC #/AREA URNS HPF: ABNORMAL /HPF (ref 0–5)
SARS-COV-2 RDRP RESP QL NAA+PROBE: NOT DETECTED
SODIUM SERPL-SCNC: 138 MMOL/L (ref 136–145)
SP GR UR REFRACTOMETRY: 1.01 (ref 1–1.03)
URINE CULTURE IF INDICATED: ABNORMAL
UROBILINOGEN UR QL STRIP.AUTO: 0.1 EU/DL (ref 0.1–1)
WBC # BLD AUTO: 18.4 K/UL (ref 4.1–11.1)
WBC URNS QL MICRO: ABNORMAL /HPF (ref 0–4)

## 2023-10-10 PROCEDURE — 87086 URINE CULTURE/COLONY COUNT: CPT

## 2023-10-10 PROCEDURE — 83605 ASSAY OF LACTIC ACID: CPT

## 2023-10-10 PROCEDURE — 6360000002 HC RX W HCPCS: Performed by: EMERGENCY MEDICINE

## 2023-10-10 PROCEDURE — 6360000002 HC RX W HCPCS: Performed by: STUDENT IN AN ORGANIZED HEALTH CARE EDUCATION/TRAINING PROGRAM

## 2023-10-10 PROCEDURE — 96375 TX/PRO/DX INJ NEW DRUG ADDON: CPT

## 2023-10-10 PROCEDURE — 87186 SC STD MICRODIL/AGAR DIL: CPT

## 2023-10-10 PROCEDURE — 6370000000 HC RX 637 (ALT 250 FOR IP): Performed by: STUDENT IN AN ORGANIZED HEALTH CARE EDUCATION/TRAINING PROGRAM

## 2023-10-10 PROCEDURE — 6370000000 HC RX 637 (ALT 250 FOR IP): Performed by: EMERGENCY MEDICINE

## 2023-10-10 PROCEDURE — 36415 COLL VENOUS BLD VENIPUNCTURE: CPT

## 2023-10-10 PROCEDURE — 81001 URINALYSIS AUTO W/SCOPE: CPT

## 2023-10-10 PROCEDURE — 74177 CT ABD & PELVIS W/CONTRAST: CPT

## 2023-10-10 PROCEDURE — 99285 EMERGENCY DEPT VISIT HI MDM: CPT

## 2023-10-10 PROCEDURE — 71045 X-RAY EXAM CHEST 1 VIEW: CPT

## 2023-10-10 PROCEDURE — 96361 HYDRATE IV INFUSION ADD-ON: CPT

## 2023-10-10 PROCEDURE — 93971 EXTREMITY STUDY: CPT

## 2023-10-10 PROCEDURE — 85025 COMPLETE CBC W/AUTO DIFF WBC: CPT

## 2023-10-10 PROCEDURE — 2580000003 HC RX 258: Performed by: STUDENT IN AN ORGANIZED HEALTH CARE EDUCATION/TRAINING PROGRAM

## 2023-10-10 PROCEDURE — 87077 CULTURE AEROBIC IDENTIFY: CPT

## 2023-10-10 PROCEDURE — 2580000003 HC RX 258: Performed by: EMERGENCY MEDICINE

## 2023-10-10 PROCEDURE — 87804 INFLUENZA ASSAY W/OPTIC: CPT

## 2023-10-10 PROCEDURE — 96365 THER/PROPH/DIAG IV INF INIT: CPT

## 2023-10-10 PROCEDURE — 87635 SARS-COV-2 COVID-19 AMP PRB: CPT

## 2023-10-10 PROCEDURE — 6360000004 HC RX CONTRAST MEDICATION: Performed by: EMERGENCY MEDICINE

## 2023-10-10 PROCEDURE — 87040 BLOOD CULTURE FOR BACTERIA: CPT

## 2023-10-10 PROCEDURE — 80053 COMPREHEN METABOLIC PANEL: CPT

## 2023-10-10 PROCEDURE — 1100000000 HC RM PRIVATE

## 2023-10-10 RX ORDER — ONDANSETRON 4 MG/1
4 TABLET, ORALLY DISINTEGRATING ORAL EVERY 8 HOURS PRN
Status: DISCONTINUED | OUTPATIENT
Start: 2023-10-10 | End: 2023-10-20 | Stop reason: HOSPADM

## 2023-10-10 RX ORDER — POLYETHYLENE GLYCOL 3350 17 G/17G
17 POWDER, FOR SOLUTION ORAL DAILY PRN
Status: DISCONTINUED | OUTPATIENT
Start: 2023-10-10 | End: 2023-10-20 | Stop reason: HOSPADM

## 2023-10-10 RX ORDER — ACETAMINOPHEN 650 MG/1
650 SUPPOSITORY RECTAL EVERY 6 HOURS PRN
Status: DISCONTINUED | OUTPATIENT
Start: 2023-10-10 | End: 2023-10-20 | Stop reason: HOSPADM

## 2023-10-10 RX ORDER — TAMSULOSIN HYDROCHLORIDE 0.4 MG/1
0.4 CAPSULE ORAL DAILY
Status: DISCONTINUED | OUTPATIENT
Start: 2023-10-10 | End: 2023-10-20 | Stop reason: HOSPADM

## 2023-10-10 RX ORDER — SODIUM CHLORIDE 9 MG/ML
INJECTION, SOLUTION INTRAVENOUS PRN
Status: DISCONTINUED | OUTPATIENT
Start: 2023-10-10 | End: 2023-10-20 | Stop reason: HOSPADM

## 2023-10-10 RX ORDER — ONDANSETRON 2 MG/ML
4 INJECTION INTRAMUSCULAR; INTRAVENOUS EVERY 6 HOURS PRN
Status: DISCONTINUED | OUTPATIENT
Start: 2023-10-10 | End: 2023-10-20 | Stop reason: HOSPADM

## 2023-10-10 RX ORDER — 0.9 % SODIUM CHLORIDE 0.9 %
1000 INTRAVENOUS SOLUTION INTRAVENOUS
Status: COMPLETED | OUTPATIENT
Start: 2023-10-10 | End: 2023-10-10

## 2023-10-10 RX ORDER — KETOROLAC TROMETHAMINE 15 MG/ML
15 INJECTION, SOLUTION INTRAMUSCULAR; INTRAVENOUS EVERY 6 HOURS PRN
Status: DISPENSED | OUTPATIENT
Start: 2023-10-10 | End: 2023-10-15

## 2023-10-10 RX ORDER — FENTANYL CITRATE 50 UG/ML
50 INJECTION, SOLUTION INTRAMUSCULAR; INTRAVENOUS
Status: COMPLETED | OUTPATIENT
Start: 2023-10-10 | End: 2023-10-10

## 2023-10-10 RX ORDER — ACETAMINOPHEN 325 MG/1
650 TABLET ORAL EVERY 6 HOURS PRN
Status: DISCONTINUED | OUTPATIENT
Start: 2023-10-10 | End: 2023-10-20 | Stop reason: HOSPADM

## 2023-10-10 RX ORDER — SODIUM CHLORIDE 0.9 % (FLUSH) 0.9 %
5-40 SYRINGE (ML) INJECTION EVERY 12 HOURS SCHEDULED
Status: DISCONTINUED | OUTPATIENT
Start: 2023-10-10 | End: 2023-10-20 | Stop reason: HOSPADM

## 2023-10-10 RX ORDER — SODIUM CHLORIDE 0.9 % (FLUSH) 0.9 %
5-40 SYRINGE (ML) INJECTION PRN
Status: DISCONTINUED | OUTPATIENT
Start: 2023-10-10 | End: 2023-10-20 | Stop reason: HOSPADM

## 2023-10-10 RX ORDER — ACETAMINOPHEN 325 MG/1
650 TABLET ORAL
Status: COMPLETED | OUTPATIENT
Start: 2023-10-10 | End: 2023-10-10

## 2023-10-10 RX ORDER — SODIUM CHLORIDE 9 MG/ML
INJECTION, SOLUTION INTRAVENOUS CONTINUOUS
Status: DISCONTINUED | OUTPATIENT
Start: 2023-10-10 | End: 2023-10-13

## 2023-10-10 RX ADMIN — FENTANYL CITRATE 50 MCG: 0.05 INJECTION, SOLUTION INTRAMUSCULAR; INTRAVENOUS at 13:43

## 2023-10-10 RX ADMIN — IOPAMIDOL 100 ML: 755 INJECTION, SOLUTION INTRAVENOUS at 15:13

## 2023-10-10 RX ADMIN — ACETAMINOPHEN 650 MG: 325 TABLET ORAL at 21:07

## 2023-10-10 RX ADMIN — VANCOMYCIN HYDROCHLORIDE 2500 MG: 1.25 INJECTION, POWDER, LYOPHILIZED, FOR SOLUTION INTRAVENOUS at 16:38

## 2023-10-10 RX ADMIN — SODIUM CHLORIDE 1000 ML: 9 INJECTION, SOLUTION INTRAVENOUS at 13:43

## 2023-10-10 RX ADMIN — DIATRIZOATE MEGLUMINE AND DIATRIZOATE SODIUM 30 ML: 660; 100 LIQUID ORAL; RECTAL at 13:43

## 2023-10-10 RX ADMIN — TAMSULOSIN HYDROCHLORIDE 0.4 MG: 0.4 CAPSULE ORAL at 16:38

## 2023-10-10 RX ADMIN — ACETAMINOPHEN 650 MG: 325 TABLET ORAL at 15:33

## 2023-10-10 RX ADMIN — PIPERACILLIN AND TAZOBACTAM 3375 MG: 3; .375 INJECTION, POWDER, LYOPHILIZED, FOR SOLUTION INTRAVENOUS at 21:11

## 2023-10-10 RX ADMIN — SODIUM CHLORIDE: 9 INJECTION, SOLUTION INTRAVENOUS at 18:38

## 2023-10-10 RX ADMIN — PIPERACILLIN AND TAZOBACTAM 4500 MG: 4; .5 INJECTION, POWDER, LYOPHILIZED, FOR SOLUTION INTRAVENOUS at 15:33

## 2023-10-10 RX ADMIN — SODIUM CHLORIDE, PRESERVATIVE FREE 10 ML: 5 INJECTION INTRAVENOUS at 23:27

## 2023-10-10 RX ADMIN — KETOROLAC TROMETHAMINE 15 MG: 15 INJECTION, SOLUTION INTRAMUSCULAR; INTRAVENOUS at 18:37

## 2023-10-10 ASSESSMENT — ENCOUNTER SYMPTOMS
ABDOMINAL PAIN: 1
DIARRHEA: 0
BACK PAIN: 1
COUGH: 0
SHORTNESS OF BREATH: 0
COLOR CHANGE: 1
NAUSEA: 0
VOMITING: 0

## 2023-10-10 ASSESSMENT — PAIN DESCRIPTION - LOCATION
LOCATION: HEAD;FLANK
LOCATION: FLANK

## 2023-10-10 ASSESSMENT — PAIN SCALES - GENERAL
PAINLEVEL_OUTOF10: 6
PAINLEVEL_OUTOF10: 7
PAINLEVEL_OUTOF10: 10

## 2023-10-10 ASSESSMENT — PAIN DESCRIPTION - ORIENTATION: ORIENTATION: RIGHT;LEFT

## 2023-10-10 ASSESSMENT — PAIN - FUNCTIONAL ASSESSMENT: PAIN_FUNCTIONAL_ASSESSMENT: 0-10

## 2023-10-10 NOTE — ED TRIAGE NOTES
Pt complaining of flank pain that last night, pt states he is incontinence but baseline is able to control it. Pt complaining of blood in stool that was noted this morning. Pt is also complaining of all over body pain. Pt states he does have a hernia that is supposed to be fixed on 10/22.

## 2023-10-10 NOTE — ED PROVIDER NOTES
time of this note:  CT ABDOMEN PELVIS W IV CONTRAST Additional Contrast? Oral   Final Result   1. Small fat-containing umbilical hernia with mild inflammation. 2. Urinary bladder mass versus protrusion of an enlarged prostate gland. Urologic consultation is recommended. Vascular duplex lower extremity venous right              EMERGENCY DEPARTMENT COURSE and DIFFERENTIAL DIAGNOSIS/MDM   CC/HPI Summary, DDx, ED Course, and Reassessment: Patient presenting with multiple different complaints. Regarding the right leg pain, differential includes cellulitis, DVT, strain, swelling secondary to CKD or fluid overload. Regarding the flank pain, could be related to kidney stone, pyelonephritis, musculoskeletal.  Regarding the umbilical hernia, could be all related to the hernia versus a obstruction. Clinical Management Tools:  Not Applicable    Records Reviewed (source and summary of external notes): Prior medical records and Nursing notes    Vitals:    Vitals:    10/10/23 1415 10/10/23 1425 10/10/23 1445 10/10/23 1616   BP: (!) 142/72 123/73 134/74    Pulse:    (!) 114   Resp:       Temp:       TempSrc:       SpO2: 97% 99% 95%    Weight:       Height:            ED COURSE  ED Course as of 10/10/23 [address] [facility] Oct 10, 2023   1445 Ultrasound technician told me no DVT however does have a lot of enlarged lymph nodes in the right groin region. Pending labs and CT. [JS]   1508 Labs show a white count of 18,000 and now with a tachycardia, meet some sepsis criteria even though normal lactate. No severe sepsis at this time. [JS]   [id number] Spoke with Dr. Lawanda Wick, general surgery about CT results and white count of 18,000 with tachycardia. Already got blood cultures and antibiotics and we discussed value of discharge versus admission.   After conversation decided on admission to the hospitalist to monitor for 24 hours, continued antibiotics, see what the blood culture show and Dr. Katz who has seen patient in the seen patient in the past to see him in consult versus Dr. Phoenix Ramirez. Regarding the urological issues, that we will need a urology follow-up. [JS]      ED Course User Index  [JS] Flores Frankel MD       Disposition Considerations (Tests not done, Shared Decision Making, Pt Expectation of Test or Treatment.): See ED Course    Patient was given the following medications:  Medications   diatrizoate meglumine-sodium (GASTROGRAFIN) 66-10 % solution 30 mL (30 mLs Oral Given 10/10/23 1343)   vancomycin (VANCOCIN) 2,500 mg in sodium chloride 0.9 % 500 mL IVPB (has no administration in time range)   tamsulosin (FLOMAX) capsule 0.4 mg (has no administration in time range)   sodium chloride 0.9 % bolus 1,000 mL (0 mLs IntraVENous Stopped 10/10/23 1444)   fentaNYL (SUBLIMAZE) injection 50 mcg (50 mcg IntraVENous Given 10/10/23 1343)   iopamidol (ISOVUE-370) 76 % injection 100 mL (100 mLs IntraVENous Given 10/10/23 1513)   piperacillin-tazobactam (ZOSYN) 4,500 mg in sodium chloride 0.9 % 100 mL IVPB (mini-bag) (0 mg IntraVENous Stopped 10/10/23 1603)   acetaminophen (TYLENOL) tablet 650 mg (650 mg Oral Given 10/10/23 1533)       CONSULTS: (Who and What was discussed)  IP CONSULT TO GENERAL SURGERY     Social Determinants affecting Dx or Tx: None    Smoking Cessation: Not Applicable    PROCEDURES   Unless otherwise noted above, none  Procedures      CRITICAL CARE TIME   Patient does not meet Critical Care Time, 0 minutes    ED FINAL IMPRESSION     1. Leukocytosis, unspecified type    2. Cellulitis of right lower extremity    3. Umbilical hernia without obstruction and without gangrene          DISPOSITION/PLAN   DISPOSITION Decision To Admit 10/10/2023 04:32:26 PM    Admit Note: Pt is being admitted by [facility]. The results of their tests and reason(s) for their admission have been discussed with pt and/or available family. They convey agreement and understanding for the need to be admitted and for the admission diagnosis. TO:  No follow-up provider specified. DISCHARGE MEDICATIONS:     Medication List        ASK your doctor about these medications      naproxen 500 MG tablet  Commonly known as: Naprosyn  Take 1 tablet by mouth 2 times daily                DISCONTINUED MEDICATIONS:  Current Discharge Medication List          I am the Primary Clinician of Record. Estrada Meraz MD (electronically signed)    (Please note that parts of this dictation were completed with voice recognition software. Quite often unanticipated grammatical, syntax, homophones, and other interpretive errors are inadvertently transcribed by the computer software. Please disregards these errors.  Please excuse any errors that have escaped final proofreading.)     Estrada Meraz MD  10/10/23 3239

## 2023-10-10 NOTE — H&P
Hospitalist Admission Note    NAME: Miguel Garrett   :  1975   MRN:  [id number]     Date/Time:  10/10/2023 5:36 PM    Patient PCP: No primary care provider on file.    ______________________________________________________________________  Given the patient's current clinical presentation, I have a high level of concern for decompensation if discharged from the emergency department. Complex decision making was performed, which includes reviewing the patient's available past medical records, laboratory results, and x-ray films. My assessment of this patient's clinical condition and my plan of care is as follows. Assessment / Plan:  Sepsis, unclear source of infection, suspected right ankle cellulitis  IV antibiotics, continue IV vancomycin and Zosyn  Blood cultures obtained  IV fluids  Obtain CRP and procal  Chest x-ray  COVID, influenza    Umbilical hernia  CT abdomen pelvis with mild inflammation of small fat-containing umbilical hernia  Consult general surgery, Dr. Taqueria Gerber, at patient is scheduled for repair on 10/25    Bladder mass  Urology consult  PSA      Medical Decision Making:   I personally reviewed labs: WBC 18.4  I personally reviewed imaging: Bilateral lower extremity venous duplex   Toxic drug monitoring: Monitor renal function avoid emergency  Discussed case with: ED provider. After discussion I am in agreement that acuity of patient's medical condition necessitates hospital stay. Code Status: Full code  DVT Prophylaxis: Deferred at this time as patient is low risk  GI Prophylaxis: None      Subjective:   CHIEF COMPLAINT: Back pain, myalgias, right lower extremity pain    HISTORY OF PRESENT ILLNESS:     Miguel Garrett is a 50 y.o.  male with no significant past medical history besides umbilical hernia who presented to the emergency department with multiple complaints. Patient reports he developed mid to lower back pain radiating to his groin 2 days ago that has been persistent. 10/10/2023    Narrative  EXAM:  CT ABDOMEN PELVIS W IV CONTRAST    INDICATION: Abdominal pain. Umbilical hernia. COMPARISON: None. TECHNIQUE: Helical CT of the abdomen  and pelvis  following the uneventful  intravenous administration of nonionic contrast. Oral contrast is administered  for optimal bowel opacification. Coronal and sagittal reformats are performed. CT dose reduction was achieved through use of a standardized protocol tailored  for this examination and automatic exposure control for dose modulation. FINDINGS:  The visualized lung bases demonstrate no mass or consolidation. The heart size  is normal. There is no pericardial or pleural effusion. The liver, spleen, pancreas, and adrenal glands are normal. The gall bladder is  present  without intra- or extra-hepatic biliary dilatation. The kidneys are symmetric without hydronephrosis. Right kidney cysts measure up  to 1.6 cm for which no imaging follow-up is recommended. There is a 2 mm  nonobstructing right kidney calculus. There are no dilated bowel loops. The appendix is normal.    A top normal sized right external iliac lymph node measures 1.0 cm in short  axis. There are no enlarged lymph nodes in the abdomen or pelvis. There is no  free fluid or free air. The aorta tapers without aneurysm. A left posterior urinary bladder mass versus protrusion of an enlarged prostate  gland measures 4.5 x 3.9 x 3.9 cm. There is a small fat-containing umbilical hernia with mild fat stranding. There is no aggressive bony lesion. Impression  1. Small fat-containing umbilical hernia with mild inflammation. 2. Urinary bladder mass versus protrusion of an enlarged prostate gland. Urologic consultation is recommended. Xray Result (most recent):  XR CHEST PORTABLE 05/17/2023    Narrative  INDICATION: Chest pain. Portable AP view of the chest.    There is no prior study for direct comparison.     Cardiomediastinal silhouette is

## 2023-10-11 LAB
ALBUMIN SERPL-MCNC: 3.1 G/DL (ref 3.5–5)
ALBUMIN/GLOB SERPL: 0.8 (ref 1.1–2.2)
ALP SERPL-CCNC: 78 U/L (ref 45–117)
ALT SERPL-CCNC: 88 U/L (ref 12–78)
ANION GAP SERPL CALC-SCNC: 6 MMOL/L (ref 5–15)
AST SERPL W P-5'-P-CCNC: 38 U/L (ref 15–37)
BASOPHILS # BLD: 0 K/UL (ref 0–0.1)
BASOPHILS NFR BLD: 0 % (ref 0–1)
BILIRUB SERPL-MCNC: 1.9 MG/DL (ref 0.2–1)
BUN SERPL-MCNC: 13 MG/DL (ref 6–20)
BUN/CREAT SERPL: 14 (ref 12–20)
CA-I BLD-MCNC: 8.3 MG/DL (ref 8.5–10.1)
CHLORIDE SERPL-SCNC: 108 MMOL/L (ref 97–108)
CO2 SERPL-SCNC: 24 MMOL/L (ref 21–32)
CREAT SERPL-MCNC: 0.92 MG/DL (ref 0.7–1.3)
CRP SERPL-MCNC: 20.8 MG/DL (ref 0–0.6)
DIFFERENTIAL METHOD BLD: ABNORMAL
ECHO BSA: 2.67 M2
EOSINOPHIL # BLD: 0 K/UL (ref 0–0.4)
EOSINOPHIL NFR BLD: 0 % (ref 0–7)
ERYTHROCYTE [DISTWIDTH] IN BLOOD BY AUTOMATED COUNT: 13.8 % (ref 11.5–14.5)
GLOBULIN SER CALC-MCNC: 4 G/DL (ref 2–4)
GLUCOSE BLD STRIP.AUTO-MCNC: 190 MG/DL (ref 65–100)
GLUCOSE SERPL-MCNC: 120 MG/DL (ref 65–100)
HCT VFR BLD AUTO: 40.1 % (ref 36.6–50.3)
HGB BLD-MCNC: 13.4 G/DL (ref 12.1–17)
IMM GRANULOCYTES # BLD AUTO: 0.1 K/UL (ref 0–0.04)
IMM GRANULOCYTES NFR BLD AUTO: 1 % (ref 0–0.5)
LYMPHOCYTES # BLD: 0.5 K/UL (ref 0.8–3.5)
LYMPHOCYTES NFR BLD: 5 % (ref 12–49)
MCH RBC QN AUTO: 30.2 PG (ref 26–34)
MCHC RBC AUTO-ENTMCNC: 33.4 G/DL (ref 30–36.5)
MCV RBC AUTO: 90.3 FL (ref 80–99)
MONOCYTES # BLD: 0.2 K/UL (ref 0–1)
MONOCYTES NFR BLD: 2 % (ref 5–13)
NEUTS SEG # BLD: 9.3 K/UL (ref 1.8–8)
NEUTS SEG NFR BLD: 92 % (ref 32–75)
NRBC # BLD: 0 K/UL (ref 0–0.01)
NRBC BLD-RTO: 0 PER 100 WBC
PERFORMED BY:: ABNORMAL
PLATELET # BLD AUTO: 145 K/UL (ref 150–400)
PMV BLD AUTO: 10 FL (ref 8.9–12.9)
POTASSIUM SERPL-SCNC: 3.4 MMOL/L (ref 3.5–5.1)
PROCALCITONIN SERPL-MCNC: 1.72 NG/ML
PROT SERPL-MCNC: 7.1 G/DL (ref 6.4–8.2)
PSA SERPL-MCNC: 0.4 NG/ML (ref 0.01–4)
RBC # BLD AUTO: 4.44 M/UL (ref 4.1–5.7)
SODIUM SERPL-SCNC: 138 MMOL/L (ref 136–145)
WBC # BLD AUTO: 10.1 K/UL (ref 4.1–11.1)

## 2023-10-11 PROCEDURE — 85025 COMPLETE CBC W/AUTO DIFF WBC: CPT

## 2023-10-11 PROCEDURE — 2580000003 HC RX 258: Performed by: STUDENT IN AN ORGANIZED HEALTH CARE EDUCATION/TRAINING PROGRAM

## 2023-10-11 PROCEDURE — 6360000002 HC RX W HCPCS: Performed by: STUDENT IN AN ORGANIZED HEALTH CARE EDUCATION/TRAINING PROGRAM

## 2023-10-11 PROCEDURE — 99255 IP/OBS CONSLTJ NEW/EST HI 80: CPT | Performed by: UROLOGY

## 2023-10-11 PROCEDURE — 88112 CYTOPATH CELL ENHANCE TECH: CPT

## 2023-10-11 PROCEDURE — 1100000000 HC RM PRIVATE

## 2023-10-11 PROCEDURE — 86140 C-REACTIVE PROTEIN: CPT

## 2023-10-11 PROCEDURE — 82962 GLUCOSE BLOOD TEST: CPT

## 2023-10-11 PROCEDURE — 99223 1ST HOSP IP/OBS HIGH 75: CPT | Performed by: UROLOGY

## 2023-10-11 PROCEDURE — G0103 PSA SCREENING: HCPCS

## 2023-10-11 PROCEDURE — 93971 EXTREMITY STUDY: CPT | Performed by: SURGERY

## 2023-10-11 PROCEDURE — 6370000000 HC RX 637 (ALT 250 FOR IP): Performed by: STUDENT IN AN ORGANIZED HEALTH CARE EDUCATION/TRAINING PROGRAM

## 2023-10-11 PROCEDURE — 6370000000 HC RX 637 (ALT 250 FOR IP): Performed by: EMERGENCY MEDICINE

## 2023-10-11 PROCEDURE — 84145 PROCALCITONIN (PCT): CPT

## 2023-10-11 PROCEDURE — 80053 COMPREHEN METABOLIC PANEL: CPT

## 2023-10-11 RX ADMIN — KETOROLAC TROMETHAMINE 15 MG: 15 INJECTION, SOLUTION INTRAMUSCULAR; INTRAVENOUS at 10:16

## 2023-10-11 RX ADMIN — SODIUM CHLORIDE, PRESERVATIVE FREE 10 ML: 5 INJECTION INTRAVENOUS at 10:11

## 2023-10-11 RX ADMIN — SODIUM CHLORIDE: 9 INJECTION, SOLUTION INTRAVENOUS at 03:02

## 2023-10-11 RX ADMIN — PIPERACILLIN AND TAZOBACTAM 3375 MG: 3; .375 INJECTION, POWDER, LYOPHILIZED, FOR SOLUTION INTRAVENOUS at 05:29

## 2023-10-11 RX ADMIN — ACETAMINOPHEN 650 MG: 325 TABLET ORAL at 16:15

## 2023-10-11 RX ADMIN — PIPERACILLIN AND TAZOBACTAM 3375 MG: 3; .375 INJECTION, POWDER, LYOPHILIZED, FOR SOLUTION INTRAVENOUS at 22:03

## 2023-10-11 RX ADMIN — PIPERACILLIN AND TAZOBACTAM 3375 MG: 3; .375 INJECTION, POWDER, LYOPHILIZED, FOR SOLUTION INTRAVENOUS at 13:31

## 2023-10-11 RX ADMIN — SODIUM CHLORIDE, PRESERVATIVE FREE 10 ML: 5 INJECTION INTRAVENOUS at 22:08

## 2023-10-11 RX ADMIN — SODIUM CHLORIDE: 9 INJECTION, SOLUTION INTRAVENOUS at 18:09

## 2023-10-11 RX ADMIN — VANCOMYCIN HYDROCHLORIDE 1250 MG: 1.25 INJECTION, POWDER, LYOPHILIZED, FOR SOLUTION INTRAVENOUS at 13:31

## 2023-10-11 RX ADMIN — ACETAMINOPHEN 650 MG: 325 TABLET ORAL at 02:58

## 2023-10-11 RX ADMIN — KETOROLAC TROMETHAMINE 15 MG: 15 INJECTION, SOLUTION INTRAMUSCULAR; INTRAVENOUS at 02:58

## 2023-10-11 RX ADMIN — TAMSULOSIN HYDROCHLORIDE 0.4 MG: 0.4 CAPSULE ORAL at 10:10

## 2023-10-11 RX ADMIN — ACETAMINOPHEN 650 MG: 325 TABLET ORAL at 22:45

## 2023-10-11 RX ADMIN — VANCOMYCIN HYDROCHLORIDE 1250 MG: 1.25 INJECTION, POWDER, LYOPHILIZED, FOR SOLUTION INTRAVENOUS at 00:54

## 2023-10-11 ASSESSMENT — PAIN DESCRIPTION - LOCATION
LOCATION: BACK
LOCATION: BACK;HEAD
LOCATION: HEAD

## 2023-10-11 ASSESSMENT — ENCOUNTER SYMPTOMS
BACK PAIN: 1
ABDOMINAL PAIN: 0
VOMITING: 0
NAUSEA: 0
COUGH: 0
SHORTNESS OF BREATH: 0
DIARRHEA: 0

## 2023-10-11 ASSESSMENT — PAIN SCALES - GENERAL
PAINLEVEL_OUTOF10: 8
PAINLEVEL_OUTOF10: 0
PAINLEVEL_OUTOF10: 10

## 2023-10-11 ASSESSMENT — PAIN SCALES - WONG BAKER: WONGBAKER_NUMERICALRESPONSE: 0

## 2023-10-11 ASSESSMENT — PAIN DESCRIPTION - DESCRIPTORS: DESCRIPTORS: ACHING;THROBBING

## 2023-10-11 ASSESSMENT — PAIN - FUNCTIONAL ASSESSMENT: PAIN_FUNCTIONAL_ASSESSMENT: PREVENTS OR INTERFERES SOME ACTIVE ACTIVITIES AND ADLS

## 2023-10-11 ASSESSMENT — PAIN DESCRIPTION - ORIENTATION: ORIENTATION: POSTERIOR;LOWER

## 2023-10-11 NOTE — WOUND CARE
Wound Care Note:      Wound Care into see patient because of abrasions to right lower leg    Patient resting in bed, states that he had an injury to his right ankle a while back and he was caring for it at home. States that he did not have any issues with it but had the redness and warmth to leg after wound healed. Skin is intact at this time with erythema and warmth to mid-shin. Some small scattered abrasions noted to shin, all area dry and scabbed over with no induration noted. Will leave areas open to air at this time. Patient states that he has noticed that when he does have wounds they take a while to heal, patient denies a history of diabetes but would like to get blood work done to rule out diagnosis. Skin Care & Pressure Relief Recommendations:  Minimize layers of linen  Pads under patient to optimize support surface and microclimate  Turn/Reposition approximately every 2 hours  Pillow/Wedge  Manage Incontinence  Promote Continence; Skin Protective lotion to buttocks and sacrum daily and as needed with incontinence care  Offload heels with pillows or offloading boots    Please re-consult for any changes in skin condition.

## 2023-10-11 NOTE — CARE COORDINATION
CM attempted to meet with patient to complete DCP assessment however patient asleep at this time and did not wake to knock on door or name being called. CM to follow up at a later time.

## 2023-10-11 NOTE — CONSULTS
UROLOGY CONSULT NOTE  [phone]        Patient: Yessica Davies MRN: [id number]  SSN: [id number]    YOB: 1975  Age: 50 y.o. Sex: male      REFERRING PROVIDER: Kyler  Subjective:      Yessica Davies is a 50 y.o. male who is being seen in urologic consultation for urgency, frequency, nocturia over the last few months. Multiple other medical issues going on with him. In the process of evaluation they found he had 5 white cells per high-power field, 5 red cells by    Field, small blood in his urine. Former smoker CT scan which shows a calcified posterior mass in the bladder which may be protrusion of his prostate may be bladder cancer. History reviewed. No pertinent past medical history.   Past Surgical History:   Procedure Laterality Date    ANTERIOR CRUCIATE LIGAMENT REPAIR Left 2000    HAND RECONSTRUCTION Left       Family History   Problem Relation Age of Onset    No Known Problems Mother     Pancreatic Cancer Father      Social History     Tobacco Use    Smoking status: Former     Types: Cigarettes    Smokeless tobacco: Not on file   Substance Use Topics    Alcohol use: No      Current Facility-Administered Medications   Medication Dose Route Frequency Provider Last Rate Last Admin    diatrizoate meglumine-sodium (GASTROGRAFIN) 66-10 % solution 30 mL  30 mL Oral ONCE PRN Emily Estrada MD   30 mL at 10/10/23 1343    tamsulosin (FLOMAX) capsule 0.4 mg  0.4 mg Oral Daily Emily Estrada MD   0.4 mg at 10/10/23 1638    sodium chloride flush 0.9 % injection 5-40 mL  5-40 mL IntraVENous 2 times per day Hector Stephani Sanders PA-C   10 mL at 10/10/23 2327    sodium chloride flush 0.9 % injection 5-40 mL  5-40 mL IntraVENous PRN Hector Stephani Sanders PA-C        0.9 % sodium chloride infusion   IntraVENous PRN Hector ShowStephani walden PA-C        ondansetron (ZOFRAN-ODT) disintegrating tablet 4 mg  4 mg Oral Q8H PRN Hector Stephani Sanders PA-C        Or    ondansetron (ZOFRAN) injection 4 mg  4 mg

## 2023-10-11 NOTE — PLAN OF CARE
----- Message from Chante Jeff Problem: Discharge Planning  Goal: Discharge to home or other facility with appropriate resources  Outcome: Progressing  Flowsheets (Taken 10/11/2023 1022)  Discharge to home or other facility with appropriate resources: Identify barriers to discharge with patient and caregiver     Problem: Pain  Goal: Verbalizes/displays adequate comfort level or baseline comfort level  10/11/2023 1027 by Kirk Severin, LPN  Outcome: Progressing  10/10/2023 2354 by Pedro Luis Copeland RN  Outcome: Progressing  Note: Patient is reporting that is pain is being better controlled now that he is upstairs in his own room and the lights are off. Pain will continue to be assessed and treated as needed.       Problem: Safety - Adult  Goal: Free from fall injury  Outcome: Progressing     Problem: ABCDS Injury Assessment  Goal: Absence of physical injury  Outcome: Progressing

## 2023-10-11 NOTE — CONSULTS
Surgery Consult    Patient: Reed Aleman MRN: [id number]  SSN:     YOB: 1975  Age: 50 y.o. Sex: male      Subjective:      Reed Aleman is a 50 y.o. male who is being seen for umbilical hernia scheduled for repair in 2 weeks. The patient was admitted for SIRS yesterday with right lower leg cellulitis. He endorses LBP and the R leg pain. He appears uncomfortable with pain. Discussed the possible rescheduling of the ventral hernia repair if the current symptoms continue without improvement. The hernia size and appearance are consistent as the last assessment at the clinic. Hernia is not an immediate concern for his care at this time. History reviewed. No pertinent past medical history.   Past Surgical History:   Procedure Laterality Date    ANTERIOR CRUCIATE LIGAMENT REPAIR Left 2000    HAND RECONSTRUCTION Left       Family History   Problem Relation Age of Onset    No Known Problems Mother     Pancreatic Cancer Father      Social History     Tobacco Use    Smoking status: Former     Types: Cigarettes   Substance Use Topics    Alcohol use: No    Drug use: Never      Current Facility-Administered Medications   Medication Dose Route Frequency Provider Last Rate Last Admin    diatrizoate meglumine-sodium (GASTROGRAFIN) 66-10 % solution 30 mL  30 mL Oral ONCE PRN Flores Frankel MD   30 mL at 10/10/23 1343    tamsulosin (FLOMAX) capsule 0.4 mg  0.4 mg Oral Daily Flores Frankel MD   0.4 mg at 10/11/23 1010    sodium chloride flush 0.9 % injection 5-40 mL  5-40 mL IntraVENous 2 times per day Stephani Harper PA-C   10 mL at 10/11/23 1011    sodium chloride flush 0.9 % injection 5-40 mL  5-40 mL IntraVENous PRN Stephani Harper PA-C        0.9 % sodium chloride infusion   IntraVENous PRN Stephani Harper PA-C        ondansetron (ZOFRAN-ODT) disintegrating tablet 4 mg  4 mg Oral Q8H PRN Stephani Harper PA-C        Or    ondansetron (ZOFRAN) injection 4 mg  4 mg IntraVENous Q6H PRN g/dL    RDW 13.8 11.5 - 14.5 %    Platelets 844 (L) 860 - 400 K/uL    MPV 10.0 8.9 - 12.9 FL    Nucleated RBCs 0.0 0.0  WBC    nRBC 0.00 0.00 - 0.01 K/uL    Neutrophils % 92 (H) 32 - 75 %    Lymphocytes % 5 (L) 12 - 49 %    Monocytes % 2 (L) 5 - 13 %    Eosinophils % 0 0 - 7 %    Basophils % 0 0 - 1 %    Immature Granulocytes 1 (H) 0 - 0.5 %    Neutrophils Absolute 9.3 (H) 1.8 - 8.0 K/UL    Lymphocytes Absolute 0.5 (L) 0.8 - 3.5 K/UL    Monocytes Absolute 0.2 0.0 - 1.0 K/UL    Eosinophils Absolute 0.0 0.0 - 0.4 K/UL    Basophils Absolute 0.0 0.0 - 0.1 K/UL    Absolute Immature Granulocyte 0.1 (H) 0.00 - 0.04 K/UL    Differential Type AUTOMATED     C-Reactive Protein    Collection Time: 10/11/23  6:22 AM   Result Value Ref Range    CRP 20.80 (H) 0.00 - 0.60 mg/dL   Procalcitonin    Collection Time: 10/11/23  6:22 AM   Result Value Ref Range    Procalcitonin 1.72 (H) 0 ng/mL   Cytology, Non-Gyn    Collection Time: 10/11/23 12:21 PM   Result Value Ref Range    Cytology-Non Gyn PENDING    POCT Glucose    Collection Time: 10/11/23  1:24 PM   Result Value Ref Range    POC Glucose 190 (H) 65 - 100 mg/dL    Performed by: [facility]         XR CHEST PORTABLE   Final Result      No acute process. CT ABDOMEN PELVIS W IV CONTRAST Additional Contrast? Oral   Final Result   1. Small fat-containing umbilical hernia with mild inflammation. 2. Urinary bladder mass versus protrusion of an enlarged prostate gland. Urologic consultation is recommended.        Vascular duplex lower extremity venous right   Final Result         Assessment:     Hospital Problems             Last Modified POA    * (Principal) SIRS (systemic inflammatory response syndrome) ([address]) 71/37/7248 Yes   Umbilical hernia, uncomplicated  Hematuria, bladder mass, former smoker    Negative blood culture    Plan:     Possible reschedule of the umbilical hernia repair based on progression of patient's symptoms  Followed by urology:

## 2023-10-11 NOTE — ED NOTES
Transition of care report tubed to 4E. 4E staff Esther Cabot made aware.      Sofy Ramirez RN  07/65/13 9507

## 2023-10-11 NOTE — ED NOTES
Patient complaining of 7/10 head and flank pain. PRN toradol not due at this time. PRN tylenol pulled and given.      Julio Calle RN  53/12/01 2122

## 2023-10-11 NOTE — PROGRESS NOTES
Hospitalist Progress Note    NAME:   Mitchell Zaman   : 1975   MRN: [id number]     Date/Time: 10/11/2023 11:14 AM  Patient PCP: No primary care provider on file. Estimated discharge date: 48 hours  Barriers: Cystoscopy, general surgery consult, IV antibiotics, locate source of infection    Hospital course:   Mitchell Zaman is a 50 y.o.  male with no significant past medical history besides umbilical hernia who presented to the emergency department with multiple complaints being lower back pain, chills, myalgias, right lower leg redness and pain, abdominal pain in the area of local hernia. Initial ED work-up concerning with leukocytosis of 18,000. Patient meeting SIRS criteria with tachycardia and leukocytosis. Blood cultures pending. ABG unclear source but suspected related to right ankle cellulitis. Venous duplex ultrasound for DVT. CT abdomen pelvis with small fat-containing umbilical hernia with mild inflammation, urinary bladder mass versus protrusion of enlarged prostate. General surgery consulted, Dr. Katz whom patient scheduled for surgery later this month. Urology consult for possible bladder mass. .  CRP and procalcitonin elevated. Continue IV Vanc and Zosyn. Assessment / Plan:    Sepsis, unclear source of infection, suspected right ankle cellulitis  IV antibiotics, continue IV vancomycin and Zosyn  Blood cultures pending, no growth thus far  IV fluids  CRP 20.8  Procal 1.72  Chest x-ray with no acute pathology  COVID, influenza negative  Urine culture pending     Right ankle cellulitis  Venous duplex ultrasound negative for DVT  Continue IV antibiotics     Umbilical hernia  CT abdomen pelvis with mild inflammation of small fat-containing umbilical hernia  Consult general surgery, Dr. Herminia Mac, at patient is scheduled for repair on 10/25     Bladder mass  Urology consult, Dr. Carlos Silverio. Plan for cystoscopy 10/12.   PSA pending    Back pain   Suspect musculoskeletal cause   CT abd/pelvis 20 98 % 1.88 m (6' 2\") 136.1 kg (300 lb)         Intake/Output Summary (Last 24 hours) at 10/11/2023 1114  Last data filed at 10/11/2023 0830  Gross per 24 hour   Intake 3333.32 ml   Output 925 ml   Net 2408.32 ml        I had a face to face encounter and independently examined this patient on 10/11/2023, as outlined below:    Review of Systems   Constitutional:  Positive for chills. Negative for fever. Eyes:  Negative for visual disturbance. Respiratory:  Negative for cough and shortness of breath. Cardiovascular:  Negative for chest pain and palpitations. Gastrointestinal:  Negative for abdominal pain, diarrhea, nausea and vomiting. Genitourinary:  Negative for difficulty urinating and dysuria. Musculoskeletal:  Positive for back pain and myalgias. Negative for neck pain. Skin:  Negative for rash. Neurological:  Negative for weakness, numbness and headaches. Psychiatric/Behavioral:  Negative for confusion. PHYSICAL EXAM:  Physical Exam  Vitals reviewed. Constitutional:       General: He is not in acute distress. Appearance: Normal appearance. HENT:      Head: Normocephalic and atraumatic. Eyes:      Conjunctiva/sclera: Conjunctivae normal.   Cardiovascular:      Rate and Rhythm: Regular rhythm. Tachycardia present. Heart sounds: Normal heart sounds. Pulmonary:      Effort: Pulmonary effort is normal. No respiratory distress. Breath sounds: Normal breath sounds. No wheezing or rales. Abdominal:      Palpations: Abdomen is soft. Tenderness: There is abdominal tenderness. Hernia: A hernia is present. Comments: Protuberant abdomen  Tenderness near umbilical hernia     Musculoskeletal:         General: Normal range of motion. Skin:     General: Skin is warm and dry. Comments: Area of erythema, warmth, tenderness to palpation over the right ankle. Neurological:      Mental Status: He is alert and oriented to person, place, and time.

## 2023-10-11 NOTE — PLAN OF CARE
Problem: Discharge Planning  Goal: Discharge to home or other facility with appropriate resources  Outcome: Progressing  Flowsheets (Taken 10/11/2023 1022)  Discharge to home or other facility with appropriate resources: Identify barriers to discharge with patient and caregiver     Problem: Pain  Goal: Verbalizes/displays adequate comfort level or baseline comfort level  10/11/2023 1027 by Pedro Flores LPN  Outcome: Progressing  10/10/2023 2354 by Shawn Chow RN  Outcome: Progressing  Note: Patient is reporting that is pain is being better controlled now that he is upstairs in his own room and the lights are off. Pain will continue to be assessed and treated as needed.       Problem: Safety - Adult  Goal: Free from fall injury  Outcome: Progressing     Problem: ABCDS Injury Assessment  Goal: Absence of physical injury  Outcome: Progressing

## 2023-10-11 NOTE — PLAN OF CARE
Problem: Pain  Goal: Verbalizes/displays adequate comfort level or baseline comfort level  Outcome: Progressing  Note: Patient is reporting that is pain is being better controlled now that he is upstairs in his own room and the lights are off. Pain will continue to be assessed and treated as needed.

## 2023-10-11 NOTE — ED NOTES
Assumed care of patient and fixed cardiac monitor leads. Patient asking for something to eat. Regular diet orders in for patient so provided him with lunch box at this time.      Melani Morfin RN  06/65/13 2008

## 2023-10-12 ENCOUNTER — ANESTHESIA (OUTPATIENT)
Facility: HOSPITAL | Age: 48
End: 2023-10-12
Payer: MEDICAID

## 2023-10-12 ENCOUNTER — ANESTHESIA EVENT (OUTPATIENT)
Facility: HOSPITAL | Age: 48
End: 2023-10-12
Payer: MEDICAID

## 2023-10-12 PROBLEM — C67.8 MALIGNANT NEOPLASM OF OVERLAPPING SITES OF BLADDER (HCC): Status: ACTIVE | Noted: 2023-10-12

## 2023-10-12 PROBLEM — R31.9 HEMATURIA: Status: ACTIVE | Noted: 2023-10-12

## 2023-10-12 LAB
ALBUMIN SERPL-MCNC: 3 G/DL (ref 3.5–5)
ALBUMIN/GLOB SERPL: 0.7 (ref 1.1–2.2)
ALP SERPL-CCNC: 88 U/L (ref 45–117)
ALT SERPL-CCNC: 94 U/L (ref 12–78)
ANION GAP SERPL CALC-SCNC: 5 MMOL/L (ref 5–15)
AST SERPL W P-5'-P-CCNC: 41 U/L (ref 15–37)
BASOPHILS # BLD: 0 K/UL (ref 0–0.1)
BASOPHILS NFR BLD: 0 % (ref 0–1)
BILIRUB SERPL-MCNC: 1.3 MG/DL (ref 0.2–1)
BUN SERPL-MCNC: 9 MG/DL (ref 6–20)
BUN/CREAT SERPL: 11 (ref 12–20)
CA-I BLD-MCNC: 8.3 MG/DL (ref 8.5–10.1)
CHLORIDE SERPL-SCNC: 109 MMOL/L (ref 97–108)
CO2 SERPL-SCNC: 22 MMOL/L (ref 21–32)
CREAT SERPL-MCNC: 0.83 MG/DL (ref 0.7–1.3)
CRP SERPL-MCNC: 17.8 MG/DL (ref 0–0.6)
CYTOLOGY-NON GYN: NORMAL
DIFFERENTIAL METHOD BLD: ABNORMAL
EOSINOPHIL # BLD: 0 K/UL (ref 0–0.4)
EOSINOPHIL NFR BLD: 0 % (ref 0–7)
ERYTHROCYTE [DISTWIDTH] IN BLOOD BY AUTOMATED COUNT: 13.8 % (ref 11.5–14.5)
GLOBULIN SER CALC-MCNC: 4.4 G/DL (ref 2–4)
GLUCOSE BLD STRIP.AUTO-MCNC: 153 MG/DL (ref 65–100)
GLUCOSE SERPL-MCNC: 117 MG/DL (ref 65–100)
HCT VFR BLD AUTO: 37.1 % (ref 36.6–50.3)
HGB BLD-MCNC: 12.7 G/DL (ref 12.1–17)
IMM GRANULOCYTES # BLD AUTO: 0.1 K/UL (ref 0–0.04)
IMM GRANULOCYTES NFR BLD AUTO: 1 % (ref 0–0.5)
LYMPHOCYTES # BLD: 0.8 K/UL (ref 0.8–3.5)
LYMPHOCYTES NFR BLD: 9 % (ref 12–49)
MCH RBC QN AUTO: 31 PG (ref 26–34)
MCHC RBC AUTO-ENTMCNC: 34.2 G/DL (ref 30–36.5)
MCV RBC AUTO: 90.5 FL (ref 80–99)
MONOCYTES # BLD: 0.4 K/UL (ref 0–1)
MONOCYTES NFR BLD: 5 % (ref 5–13)
NEUTS SEG # BLD: 7.1 K/UL (ref 1.8–8)
NEUTS SEG NFR BLD: 85 % (ref 32–75)
NRBC # BLD: 0 K/UL (ref 0–0.01)
NRBC BLD-RTO: 0 PER 100 WBC
PERFORMED BY:: ABNORMAL
PLATELET # BLD AUTO: 142 K/UL (ref 150–400)
PMV BLD AUTO: 10 FL (ref 8.9–12.9)
POTASSIUM SERPL-SCNC: 3.4 MMOL/L (ref 3.5–5.1)
PROCALCITONIN SERPL-MCNC: 0.93 NG/ML
PROT SERPL-MCNC: 7.4 G/DL (ref 6.4–8.2)
RBC # BLD AUTO: 4.1 M/UL (ref 4.1–5.7)
SODIUM SERPL-SCNC: 136 MMOL/L (ref 136–145)
VANCOMYCIN SERPL-MCNC: 6.2 UG/ML
WBC # BLD AUTO: 8.3 K/UL (ref 4.1–11.1)

## 2023-10-12 PROCEDURE — 2580000003 HC RX 258: Performed by: NURSE ANESTHETIST, CERTIFIED REGISTERED

## 2023-10-12 PROCEDURE — 2580000003 HC RX 258: Performed by: INTERNAL MEDICINE

## 2023-10-12 PROCEDURE — 99222 1ST HOSP IP/OBS MODERATE 55: CPT | Performed by: SURGERY

## 2023-10-12 PROCEDURE — 84145 PROCALCITONIN (PCT): CPT

## 2023-10-12 PROCEDURE — 6370000000 HC RX 637 (ALT 250 FOR IP): Performed by: EMERGENCY MEDICINE

## 2023-10-12 PROCEDURE — 3700000001 HC ADD 15 MINUTES (ANESTHESIA): Performed by: UROLOGY

## 2023-10-12 PROCEDURE — 7100000000 HC PACU RECOVERY - FIRST 15 MIN: Performed by: UROLOGY

## 2023-10-12 PROCEDURE — 6360000002 HC RX W HCPCS: Performed by: NURSE ANESTHETIST, CERTIFIED REGISTERED

## 2023-10-12 PROCEDURE — 82962 GLUCOSE BLOOD TEST: CPT

## 2023-10-12 PROCEDURE — 2580000003 HC RX 258: Performed by: STUDENT IN AN ORGANIZED HEALTH CARE EDUCATION/TRAINING PROGRAM

## 2023-10-12 PROCEDURE — 3600000013 HC SURGERY LEVEL 3 ADDTL 15MIN: Performed by: UROLOGY

## 2023-10-12 PROCEDURE — 6370000000 HC RX 637 (ALT 250 FOR IP): Performed by: ANESTHESIOLOGY

## 2023-10-12 PROCEDURE — 6360000002 HC RX W HCPCS: Performed by: STUDENT IN AN ORGANIZED HEALTH CARE EDUCATION/TRAINING PROGRAM

## 2023-10-12 PROCEDURE — 6370000000 HC RX 637 (ALT 250 FOR IP): Performed by: STUDENT IN AN ORGANIZED HEALTH CARE EDUCATION/TRAINING PROGRAM

## 2023-10-12 PROCEDURE — 36415 COLL VENOUS BLD VENIPUNCTURE: CPT

## 2023-10-12 PROCEDURE — 80053 COMPREHEN METABOLIC PANEL: CPT

## 2023-10-12 PROCEDURE — 0TBC8ZZ EXCISION OF BLADDER NECK, VIA NATURAL OR ARTIFICIAL OPENING ENDOSCOPIC: ICD-10-PCS | Performed by: UROLOGY

## 2023-10-12 PROCEDURE — 80202 ASSAY OF VANCOMYCIN: CPT

## 2023-10-12 PROCEDURE — 7100000001 HC PACU RECOVERY - ADDTL 15 MIN: Performed by: UROLOGY

## 2023-10-12 PROCEDURE — 2709999900 HC NON-CHARGEABLE SUPPLY: Performed by: UROLOGY

## 2023-10-12 PROCEDURE — 6360000002 HC RX W HCPCS: Performed by: INTERNAL MEDICINE

## 2023-10-12 PROCEDURE — 85025 COMPLETE CBC W/AUTO DIFF WBC: CPT

## 2023-10-12 PROCEDURE — 88307 TISSUE EXAM BY PATHOLOGIST: CPT

## 2023-10-12 PROCEDURE — 2500000003 HC RX 250 WO HCPCS: Performed by: NURSE ANESTHETIST, CERTIFIED REGISTERED

## 2023-10-12 PROCEDURE — 86140 C-REACTIVE PROTEIN: CPT

## 2023-10-12 PROCEDURE — 1100000000 HC RM PRIVATE

## 2023-10-12 PROCEDURE — 3600000003 HC SURGERY LEVEL 3 BASE: Performed by: UROLOGY

## 2023-10-12 PROCEDURE — 94761 N-INVAS EAR/PLS OXIMETRY MLT: CPT

## 2023-10-12 PROCEDURE — 99254 IP/OBS CNSLTJ NEW/EST MOD 60: CPT | Performed by: SURGERY

## 2023-10-12 PROCEDURE — 3700000000 HC ANESTHESIA ATTENDED CARE: Performed by: UROLOGY

## 2023-10-12 PROCEDURE — 52240 CYSTOSCOPY AND TREATMENT: CPT | Performed by: UROLOGY

## 2023-10-12 RX ORDER — PHENYLEPHRINE HYDROCHLORIDE 10 MG/ML
INJECTION INTRAVENOUS PRN
Status: DISCONTINUED | OUTPATIENT
Start: 2023-10-12 | End: 2023-10-12 | Stop reason: SDUPTHER

## 2023-10-12 RX ORDER — IPRATROPIUM BROMIDE AND ALBUTEROL SULFATE 2.5; .5 MG/3ML; MG/3ML
1 SOLUTION RESPIRATORY (INHALATION)
Status: DISCONTINUED | OUTPATIENT
Start: 2023-10-12 | End: 2023-10-12 | Stop reason: HOSPADM

## 2023-10-12 RX ORDER — PROPOFOL 10 MG/ML
INJECTION, EMULSION INTRAVENOUS PRN
Status: DISCONTINUED | OUTPATIENT
Start: 2023-10-12 | End: 2023-10-12 | Stop reason: SDUPTHER

## 2023-10-12 RX ORDER — FENTANYL CITRATE 50 UG/ML
50 INJECTION, SOLUTION INTRAMUSCULAR; INTRAVENOUS EVERY 5 MIN PRN
Status: DISCONTINUED | OUTPATIENT
Start: 2023-10-12 | End: 2023-10-12 | Stop reason: HOSPADM

## 2023-10-12 RX ORDER — MEPERIDINE HYDROCHLORIDE 25 MG/ML
12.5 INJECTION INTRAMUSCULAR; INTRAVENOUS; SUBCUTANEOUS EVERY 5 MIN PRN
Status: DISCONTINUED | OUTPATIENT
Start: 2023-10-12 | End: 2023-10-12 | Stop reason: HOSPADM

## 2023-10-12 RX ORDER — SODIUM CHLORIDE, SODIUM LACTATE, POTASSIUM CHLORIDE, CALCIUM CHLORIDE 600; 310; 30; 20 MG/100ML; MG/100ML; MG/100ML; MG/100ML
INJECTION, SOLUTION INTRAVENOUS ONCE
Status: DISCONTINUED | OUTPATIENT
Start: 2023-10-12 | End: 2023-10-12 | Stop reason: HOSPADM

## 2023-10-12 RX ORDER — DIAPER,BRIEF,INFANT-TODD,DISP
EACH MISCELLANEOUS 2 TIMES DAILY
Status: DISCONTINUED | OUTPATIENT
Start: 2023-10-12 | End: 2023-10-20 | Stop reason: HOSPADM

## 2023-10-12 RX ORDER — SODIUM CHLORIDE 9 MG/ML
INJECTION, SOLUTION INTRAVENOUS PRN
Status: DISCONTINUED | OUTPATIENT
Start: 2023-10-12 | End: 2023-10-12 | Stop reason: HOSPADM

## 2023-10-12 RX ORDER — LABETALOL HYDROCHLORIDE 5 MG/ML
10 INJECTION, SOLUTION INTRAVENOUS
Status: DISCONTINUED | OUTPATIENT
Start: 2023-10-12 | End: 2023-10-12 | Stop reason: HOSPADM

## 2023-10-12 RX ORDER — DIPHENHYDRAMINE HYDROCHLORIDE 50 MG/ML
12.5 INJECTION INTRAMUSCULAR; INTRAVENOUS
Status: DISCONTINUED | OUTPATIENT
Start: 2023-10-12 | End: 2023-10-12 | Stop reason: HOSPADM

## 2023-10-12 RX ORDER — OXYCODONE HYDROCHLORIDE 5 MG/1
5 TABLET ORAL PRN
Status: COMPLETED | OUTPATIENT
Start: 2023-10-12 | End: 2023-10-12

## 2023-10-12 RX ORDER — LORAZEPAM 2 MG/ML
0.5 INJECTION INTRAMUSCULAR
Status: DISCONTINUED | OUTPATIENT
Start: 2023-10-12 | End: 2023-10-12 | Stop reason: HOSPADM

## 2023-10-12 RX ORDER — SODIUM CHLORIDE 9 MG/ML
INJECTION, SOLUTION INTRAVENOUS CONTINUOUS PRN
Status: DISCONTINUED | OUTPATIENT
Start: 2023-10-12 | End: 2023-10-12 | Stop reason: SDUPTHER

## 2023-10-12 RX ORDER — HYDROMORPHONE HYDROCHLORIDE 1 MG/ML
0.5 INJECTION, SOLUTION INTRAMUSCULAR; INTRAVENOUS; SUBCUTANEOUS EVERY 5 MIN PRN
Status: DISCONTINUED | OUTPATIENT
Start: 2023-10-12 | End: 2023-10-12 | Stop reason: HOSPADM

## 2023-10-12 RX ORDER — OXYCODONE HYDROCHLORIDE AND ACETAMINOPHEN 5; 325 MG/1; MG/1
1 TABLET ORAL EVERY 6 HOURS PRN
Status: DISCONTINUED | OUTPATIENT
Start: 2023-10-12 | End: 2023-10-20 | Stop reason: HOSPADM

## 2023-10-12 RX ORDER — MIDAZOLAM HYDROCHLORIDE 1 MG/ML
INJECTION INTRAMUSCULAR; INTRAVENOUS PRN
Status: DISCONTINUED | OUTPATIENT
Start: 2023-10-12 | End: 2023-10-12 | Stop reason: SDUPTHER

## 2023-10-12 RX ORDER — EPHEDRINE SULFATE 50 MG/ML
INJECTION INTRAVENOUS PRN
Status: DISCONTINUED | OUTPATIENT
Start: 2023-10-12 | End: 2023-10-12 | Stop reason: SDUPTHER

## 2023-10-12 RX ORDER — PHENAZOPYRIDINE HYDROCHLORIDE 95 MG/1
95 TABLET ORAL
Status: DISCONTINUED | OUTPATIENT
Start: 2023-10-12 | End: 2023-10-20 | Stop reason: HOSPADM

## 2023-10-12 RX ORDER — HYDRALAZINE HYDROCHLORIDE 20 MG/ML
10 INJECTION INTRAMUSCULAR; INTRAVENOUS
Status: DISCONTINUED | OUTPATIENT
Start: 2023-10-12 | End: 2023-10-12 | Stop reason: HOSPADM

## 2023-10-12 RX ORDER — FENTANYL CITRATE 50 UG/ML
INJECTION, SOLUTION INTRAMUSCULAR; INTRAVENOUS PRN
Status: DISCONTINUED | OUTPATIENT
Start: 2023-10-12 | End: 2023-10-12 | Stop reason: SDUPTHER

## 2023-10-12 RX ORDER — SODIUM CHLORIDE 0.9 % (FLUSH) 0.9 %
5-40 SYRINGE (ML) INJECTION PRN
Status: DISCONTINUED | OUTPATIENT
Start: 2023-10-12 | End: 2023-10-12 | Stop reason: HOSPADM

## 2023-10-12 RX ORDER — OXYCODONE HYDROCHLORIDE 5 MG/1
10 TABLET ORAL PRN
Status: COMPLETED | OUTPATIENT
Start: 2023-10-12 | End: 2023-10-12

## 2023-10-12 RX ORDER — DEXTROSE MONOHYDRATE 100 MG/ML
INJECTION, SOLUTION INTRAVENOUS CONTINUOUS PRN
Status: DISCONTINUED | OUTPATIENT
Start: 2023-10-12 | End: 2023-10-12 | Stop reason: HOSPADM

## 2023-10-12 RX ORDER — METOCLOPRAMIDE HYDROCHLORIDE 5 MG/ML
10 INJECTION INTRAMUSCULAR; INTRAVENOUS
Status: DISCONTINUED | OUTPATIENT
Start: 2023-10-12 | End: 2023-10-12 | Stop reason: HOSPADM

## 2023-10-12 RX ORDER — ONDANSETRON 2 MG/ML
INJECTION INTRAMUSCULAR; INTRAVENOUS PRN
Status: DISCONTINUED | OUTPATIENT
Start: 2023-10-12 | End: 2023-10-12 | Stop reason: SDUPTHER

## 2023-10-12 RX ORDER — SODIUM CHLORIDE 0.9 % (FLUSH) 0.9 %
5-40 SYRINGE (ML) INJECTION EVERY 12 HOURS SCHEDULED
Status: DISCONTINUED | OUTPATIENT
Start: 2023-10-12 | End: 2023-10-12 | Stop reason: HOSPADM

## 2023-10-12 RX ORDER — FENTANYL CITRATE 50 UG/ML
INJECTION, SOLUTION INTRAMUSCULAR; INTRAVENOUS
Status: DISCONTINUED
Start: 2023-10-12 | End: 2023-10-12 | Stop reason: WASHOUT

## 2023-10-12 RX ORDER — ONDANSETRON 2 MG/ML
4 INJECTION INTRAMUSCULAR; INTRAVENOUS
Status: DISCONTINUED | OUTPATIENT
Start: 2023-10-12 | End: 2023-10-12 | Stop reason: HOSPADM

## 2023-10-12 RX ORDER — DEXAMETHASONE SODIUM PHOSPHATE 4 MG/ML
INJECTION, SOLUTION INTRA-ARTICULAR; INTRALESIONAL; INTRAMUSCULAR; INTRAVENOUS; SOFT TISSUE PRN
Status: DISCONTINUED | OUTPATIENT
Start: 2023-10-12 | End: 2023-10-12 | Stop reason: SDUPTHER

## 2023-10-12 RX ORDER — LIDOCAINE HYDROCHLORIDE 20 MG/ML
INJECTION, SOLUTION EPIDURAL; INFILTRATION; INTRACAUDAL; PERINEURAL PRN
Status: DISCONTINUED | OUTPATIENT
Start: 2023-10-12 | End: 2023-10-12 | Stop reason: SDUPTHER

## 2023-10-12 RX ORDER — LIDOCAINE 4 G/G
1 PATCH TOPICAL AS NEEDED
Status: DISCONTINUED | OUTPATIENT
Start: 2023-10-12 | End: 2023-10-12 | Stop reason: HOSPADM

## 2023-10-12 RX ADMIN — PHENYLEPHRINE HYDROCHLORIDE 200 MCG: 10 INJECTION INTRAVENOUS at 16:28

## 2023-10-12 RX ADMIN — PIPERACILLIN AND TAZOBACTAM 3375 MG: 3; .375 INJECTION, POWDER, LYOPHILIZED, FOR SOLUTION INTRAVENOUS at 14:29

## 2023-10-12 RX ADMIN — VANCOMYCIN HYDROCHLORIDE 1250 MG: 1.25 INJECTION, POWDER, LYOPHILIZED, FOR SOLUTION INTRAVENOUS at 00:46

## 2023-10-12 RX ADMIN — PIPERACILLIN AND TAZOBACTAM 3375 MG: 3; .375 INJECTION, POWDER, LYOPHILIZED, FOR SOLUTION INTRAVENOUS at 22:54

## 2023-10-12 RX ADMIN — SODIUM CHLORIDE, PRESERVATIVE FREE 10 ML: 5 INJECTION INTRAVENOUS at 10:01

## 2023-10-12 RX ADMIN — SODIUM CHLORIDE, PRESERVATIVE FREE 10 ML: 5 INJECTION INTRAVENOUS at 21:54

## 2023-10-12 RX ADMIN — PROPOFOL 200 MG: 10 INJECTION, EMULSION INTRAVENOUS at 15:49

## 2023-10-12 RX ADMIN — DEXAMETHASONE SODIUM PHOSPHATE 4 MG: 4 INJECTION, SOLUTION INTRA-ARTICULAR; INTRALESIONAL; INTRAMUSCULAR; INTRAVENOUS; SOFT TISSUE at 15:49

## 2023-10-12 RX ADMIN — EPHEDRINE SULFATE 10 MG: 50 INJECTION INTRAVENOUS at 16:38

## 2023-10-12 RX ADMIN — ACETAMINOPHEN 650 MG: 325 TABLET ORAL at 10:00

## 2023-10-12 RX ADMIN — PHENYLEPHRINE HYDROCHLORIDE 100 MCG: 10 INJECTION INTRAVENOUS at 16:15

## 2023-10-12 RX ADMIN — LIDOCAINE HYDROCHLORIDE 100 MG: 20 INJECTION, SOLUTION EPIDURAL; INFILTRATION; INTRACAUDAL; PERINEURAL at 15:49

## 2023-10-12 RX ADMIN — PHENYLEPHRINE HYDROCHLORIDE 100 MCG: 10 INJECTION INTRAVENOUS at 16:22

## 2023-10-12 RX ADMIN — PIPERACILLIN AND TAZOBACTAM 3375 MG: 3; .375 INJECTION, POWDER, LYOPHILIZED, FOR SOLUTION INTRAVENOUS at 05:49

## 2023-10-12 RX ADMIN — FENTANYL CITRATE 100 MCG: 50 INJECTION, SOLUTION INTRAMUSCULAR; INTRAVENOUS at 15:43

## 2023-10-12 RX ADMIN — SODIUM CHLORIDE: 9 INJECTION, SOLUTION INTRAVENOUS at 15:43

## 2023-10-12 RX ADMIN — KETOROLAC TROMETHAMINE 15 MG: 15 INJECTION, SOLUTION INTRAMUSCULAR; INTRAVENOUS at 21:47

## 2023-10-12 RX ADMIN — PHENYLEPHRINE HYDROCHLORIDE 100 MCG: 10 INJECTION INTRAVENOUS at 16:34

## 2023-10-12 RX ADMIN — ACETAMINOPHEN 650 MG: 325 TABLET ORAL at 23:52

## 2023-10-12 RX ADMIN — OXYCODONE HYDROCHLORIDE 10 MG: 5 TABLET ORAL at 19:28

## 2023-10-12 RX ADMIN — ONDANSETRON 4 MG: 2 INJECTION INTRAMUSCULAR; INTRAVENOUS at 15:49

## 2023-10-12 RX ADMIN — VANCOMYCIN HYDROCHLORIDE 1250 MG: 1.25 INJECTION, POWDER, LYOPHILIZED, FOR SOLUTION INTRAVENOUS at 14:29

## 2023-10-12 RX ADMIN — EPHEDRINE SULFATE 10 MG: 50 INJECTION INTRAVENOUS at 16:37

## 2023-10-12 RX ADMIN — MIDAZOLAM HYDROCHLORIDE 2 MG: 2 INJECTION, SOLUTION INTRAMUSCULAR; INTRAVENOUS at 15:43

## 2023-10-12 RX ADMIN — HYDROMORPHONE HYDROCHLORIDE 0.2 MG: 1 INJECTION, SOLUTION INTRAMUSCULAR; INTRAVENOUS; SUBCUTANEOUS at 16:08

## 2023-10-12 RX ADMIN — VANCOMYCIN HYDROCHLORIDE 1250 MG: 1.25 INJECTION, POWDER, LYOPHILIZED, FOR SOLUTION INTRAVENOUS at 21:41

## 2023-10-12 RX ADMIN — HYDROMORPHONE HYDROCHLORIDE 0.6 MG: 1 INJECTION, SOLUTION INTRAMUSCULAR; INTRAVENOUS; SUBCUTANEOUS at 16:15

## 2023-10-12 RX ADMIN — HYDROMORPHONE HYDROCHLORIDE 0.2 MG: 1 INJECTION, SOLUTION INTRAMUSCULAR; INTRAVENOUS; SUBCUTANEOUS at 16:02

## 2023-10-12 RX ADMIN — TAMSULOSIN HYDROCHLORIDE 0.4 MG: 0.4 CAPSULE ORAL at 10:00

## 2023-10-12 ASSESSMENT — ENCOUNTER SYMPTOMS
DIARRHEA: 0
SHORTNESS OF BREATH: 0
WHEEZING: 0
BACK PAIN: 0
SORE THROAT: 0
ABDOMINAL PAIN: 0
NAUSEA: 0
BACK PAIN: 1
EYE REDNESS: 0
VOMITING: 0
COUGH: 0

## 2023-10-12 ASSESSMENT — PAIN DESCRIPTION - LOCATION
LOCATION: PENIS
LOCATION: GROIN

## 2023-10-12 ASSESSMENT — PAIN SCALES - GENERAL
PAINLEVEL_OUTOF10: 10
PAINLEVEL_OUTOF10: 0

## 2023-10-12 ASSESSMENT — PAIN SCALES - WONG BAKER: WONGBAKER_NUMERICALRESPONSE: 0

## 2023-10-12 ASSESSMENT — PAIN - FUNCTIONAL ASSESSMENT: PAIN_FUNCTIONAL_ASSESSMENT: NONE - DENIES PAIN

## 2023-10-12 ASSESSMENT — PAIN DESCRIPTION - DESCRIPTORS: DESCRIPTORS: ACHING;DISCOMFORT;NAGGING

## 2023-10-12 NOTE — CONSULTS
Surgery Consult    Patient: Terence Rouse MRN: [id number]  SSN: [id number]    YOB: 1975  Age: 50 y.o. Sex: male      Subjective:      Terence Rouse is a 50 y.o. male who is being seen for umbilical hernia scheduled for repair in 2 weeks. The patient was admitted for SIRS yesterday with right lower leg cellulitis. He endorses LBP and the R leg pain. He appears uncomfortable with pain. Discussed the possible rescheduling of the ventral hernia repair if the current symptoms continue without improvement. The hernia size and appearance are consistent as the last assessment at the clinic. Hernia is not an immediate concern for his care at this time.     PMH: no diagnosed medical issues    Past Surgical History:   Procedure Laterality Date    ANTERIOR CRUCIATE LIGAMENT REPAIR Left 2000    HAND RECONSTRUCTION Left       Family History   Problem Relation Age of Onset    No Known Problems Mother     Pancreatic Cancer Father      Social History     Tobacco Use    Smoking status: Former     Types: Cigarettes   Substance Use Topics    Alcohol use: No    Drug use: Never      Current Facility-Administered Medications   Medication Dose Route Frequency Provider Last Rate Last Admin    diatrizoate meglumine-sodium (GASTROGRAFIN) 66-10 % solution 30 mL  30 mL Oral ONCE PRN Enrique Hanson MD   30 mL at 10/10/23 1343    tamsulosin (FLOMAX) capsule 0.4 mg  0.4 mg Oral Daily Enrique Hanson MD   0.4 mg at 10/12/23 1000    sodium chloride flush 0.9 % injection 5-40 mL  5-40 mL IntraVENous 2 times per day Stephani Lawson PA-C   10 mL at 10/12/23 1001    sodium chloride flush 0.9 % injection 5-40 mL  5-40 mL IntraVENous PRN Stephani Lawson PA-C        0.9 % sodium chloride infusion   IntraVENous PRN Narinder Stephani Carbone PA-C        ondansetron (ZOFRAN-ODT) disintegrating tablet 4 mg  4 mg Oral Q8H PRN Stephani Lawson PA-C        Or    ondansetron (ZOFRAN) injection 4 mg  4 mg IntraVENous Q6H PRN Lorena Banks UVALDO        polyethylene glycol (GLYCOLAX) packet 17 g  17 g Oral Daily PRN Stephani Lr PA-C        acetaminophen (TYLENOL) tablet 650 mg  650 mg Oral Q6H PRN Claude Dopp, PA-C   650 mg at 10/12/23 1000    Or    acetaminophen (TYLENOL) suppository 650 mg  650 mg Rectal Q6H PRN Claude Dopp, PA-C        piperacillin-tazobactam (ZOSYN) 3,375 mg in sodium chloride 0.9 % 50 mL IVPB (mini-bag)  3,375 mg IntraVENous Q8H Stephani Parson PA-C   Stopped at 10/12/23 1001    0.9 % sodium chloride infusion   IntraVENous Continuous Claude Dopp, PA-C 100 mL/hr at 10/11/23 1809 New Bag at 10/11/23 1809    ketorolac (TORADOL) injection 15 mg  15 mg IntraVENous Q6H PRN Claude Dopp, PA-C   15 mg at 10/11/23 1016    vancomycin (VANCOCIN) 1,250 mg in sodium chloride 0.9 % 250 mL IVPB (Twob1Cff)  1,250 mg IntraVENous Q12H Claude Dopp, PA-C   Stopped at 10/12/23 0220    vancomycin (VANCOCIN) intermittent dosing (placeholder)  1 each Other RX Placeholder Claude Dopp, PA-C        Vancomycin - level scheduled for 10/12 1100. Thanks! Other Once Claude Dopp, PA-C            No Known Allergies    Review of Systems:  Review of Systems   Constitutional:  Positive for chills and fever. Negative for unexpected weight change. HENT:  Negative for congestion, ear pain and sore throat. Eyes:  Negative for redness and visual disturbance. Respiratory:  Negative for cough, shortness of breath and wheezing. Cardiovascular:  Positive for leg swelling. Negative for chest pain and palpitations. Gastrointestinal:  Negative for abdominal pain, nausea and vomiting. Genitourinary:  Positive for difficulty urinating and dysuria. Negative for frequency and hematuria. Musculoskeletal:  Positive for myalgias. Negative for back pain and neck pain. Skin:  Negative for rash and wound. Neurological:  Negative for dizziness, weakness and headaches. Psychiatric/Behavioral:  Negative for confusion. venous right   Final Result         Assessment:     Hospital Problems             Last Modified POA    * (Principal) SIRS (systemic inflammatory response syndrome) ([address]) 05/26/3401 Yes   Umbilical hernia, uncomplicated  Hematuria, bladder mass, former smoker    Negative blood culture    Plan:     If resolution of his infection and cleared by urology, may proceed with hernia repair as scheduled. OK to reschedule if infection not cleared, discussed with patient. Followed by urology: Cystoscopy possible TURP or BT  Continue antibiotics  Pending urine culture  Continue current medical care per primary team    Signed By: Roz Katz DO     October 12, 2023         Thank you for allowing me to participate in this patients care.

## 2023-10-12 NOTE — ANESTHESIA PRE PROCEDURE
Neuro/Psych:   Negative Neuro/Psych ROS              GI/Hepatic/Renal:   (+) morbid obesity          Endo/Other:                      ROS comment: Enlarged Prostate vs. Bladder Ca Abdominal:             Vascular: negative vascular ROS. Other Findings:           Anesthesia Plan      general     ASA 3       Induction: intravenous. continuous noninvasive hemodynamic monitor  MIPS: Postoperative opioids intended and Prophylactic antiemetics administered. Anesthetic plan and risks discussed with patient. Plan discussed with CRNA.     Attending anesthesiologist reviewed and agrees with Marisabel Todd MD   10/12/2023

## 2023-10-12 NOTE — PROGRESS NOTES
Hospitalist Progress Note    NAME:   Barrera Elias   : 1975   MRN: [id number]     Date/Time: 10/12/2023 3:47 PM  Patient PCP: No primary care provider on file. Estimated discharge date: 48 hours  Barriers: Cystoscopy, IV antibiotics. Hospital course:   Barrera Elias is a 50 y.o.  male with no significant past medical history besides umbilical hernia who presented to the emergency department with multiple complaints being lower back pain, chills, myalgias, right lower leg redness and pain, abdominal pain in the area of local hernia. Initial ED work-up concerning with leukocytosis of 18,000. Patient meeting SIRS criteria with tachycardia and leukocytosis. Blood cultures pending. Unclear source but suspected related to right ankle cellulitis. Venous duplex ultrasound for DVT. CT abdomen pelvis with small fat-containing umbilical hernia with mild inflammation, urinary bladder mass versus protrusion of enlarged prostate. General surgery consulted, Dr. Katz whom patient scheduled for surgery later this month, can continue outpatient surgery. Urology consult for possible bladder mass. .  CRP and procalcitonin elevated, being treated with IV Vanc and Zosyn. Urology consulted for possible bladder mass. Patient undergo cystoscopy with possible TURP 10/12. Assessment / Plan:    SIRS, unclear source of infection, suspected right ankle cellulitis  IV antibiotics, continue IV vancomycin and Zosyn  Blood cultures pending, no growth after 2 days  IV fluids  CRP 20.8 > 17.80  Procal 1.72 > 0.93  Chest x-ray with no acute pathology  COVID, influenza negative  Urine culture + staph aureus, 15,000 colonies/ml.       Right ankle cellulitis  Venous duplex ultrasound negative for DVT  Continue IV antibiotics     Umbilical hernia  CT abdomen pelvis with mild inflammation of small fat-containing umbilical hernia  Consulted general surgery, Dr. Austin Greco, at patient is scheduled for repair on 10/25, which he can below:    Review of Systems   Constitutional:  Positive for chills. Negative for fever. Eyes:  Negative for visual disturbance. Respiratory:  Negative for cough and shortness of breath. Cardiovascular:  Negative for chest pain and palpitations. Gastrointestinal:  Negative for abdominal pain, diarrhea, nausea and vomiting. Genitourinary:  Negative for difficulty urinating and dysuria. Musculoskeletal:  Positive for back pain and myalgias. Negative for neck pain. Skin:  Negative for rash. Neurological:  Negative for weakness, numbness and headaches. Psychiatric/Behavioral:  Negative for confusion. PHYSICAL EXAM:  Physical Exam  Vitals reviewed. Constitutional:       General: He is not in acute distress. Appearance: Normal appearance. HENT:      Head: Normocephalic and atraumatic. Eyes:      Conjunctiva/sclera: Conjunctivae normal.   Cardiovascular:      Rate and Rhythm: Regular rhythm. Tachycardia present. Heart sounds: Normal heart sounds. Pulmonary:      Effort: Pulmonary effort is normal. No respiratory distress. Breath sounds: Normal breath sounds. No wheezing or rales. Abdominal:      Palpations: Abdomen is soft. Tenderness: There is abdominal tenderness. Hernia: A hernia is present. Comments: Protuberant abdomen  Tenderness near umbilical hernia     Musculoskeletal:      Comments: Mild tenderness paraspinal lumbar region, no midline spinal tenderness. Skin:     General: Skin is warm and dry. Comments: Area of erythema, warmth, tenderness to palpation over the right ankle. Neurological:      Mental Status: He is alert and oriented to person, place, and time.           Reviewed most current lab test results and cultures  YES  Reviewed most current radiology test results   YES  Review and summation of old records today    NO  Reviewed patient's current orders and MAR    YES  PMH/SH reviewed - no change compared to H&P  ________________________________________________________________________  Care Plan discussed with:    Comments   Patient x    Family      RN x    Care Manager     Consultant                        Multidiciplinary team rounds were held today with , nursing, pharmacist and clinical coordinator. Patient's plan of care was discussed; medications were reviewed and discharge planning was addressed. ________________________________________________________________________  Total NON critical care TIME:  35  Minutes    Total CRITICAL CARE TIME Spent:   Minutes non procedure based      Comments   >50% of visit spent in counseling and coordination of care     ________________________________________________________________________  Nely Huerta PA-C     Procedures: see electronic medical records for all procedures/Xrays and details which were not copied into this note but were reviewed prior to creation of Plan. LABS:  I reviewed today's most current labs and imaging studies.   Pertinent labs include:  Recent Labs     10/10/23  1332 10/11/23  0622 10/12/23  0713   WBC 18.4* 10.1 8.3   HGB 14.6 13.4 12.7   HCT 44.2 40.1 37.1    145* 142*       Recent Labs     10/10/23  1332 10/11/23  0622 10/12/23  0713    138 136   K 3.9 3.4* 3.4*    108 109*   CO2 26 24 22   BUN 15 13 9   ALT 70 88* 94*         Signed: Nely Huerta PA-C

## 2023-10-12 NOTE — PLAN OF CARE
neurological status     Problem: Respiratory - Adult  Goal: Achieves optimal ventilation and oxygenation  10/12/2023 1017 by Lynsey Tong LPN  Outcome: Progressing  10/12/2023 1010 by Lynsey Tong LPN  Outcome: Progressing  Flowsheets  Taken 10/12/2023 1004 by Lynsey Tong LPN  Achieves optimal ventilation and oxygenation: Assess for changes in respiratory status  Taken 10/11/2023 2203 by Bryant Brumfield RN  Achieves optimal ventilation and oxygenation:   Assess for changes in respiratory status   Assess for changes in mentation and behavior   Position to facilitate oxygenation and minimize respiratory effort     Problem: Cardiovascular - Adult  Goal: Maintains optimal cardiac output and hemodynamic stability  10/12/2023 1017 by Lynsey Tong LPN  Outcome: Progressing  10/12/2023 1010 by Lynsey Tong LPN  Outcome: Progressing  Flowsheets  Taken 10/12/2023 1004 by Lynsey Tong LPN  Maintains optimal cardiac output and hemodynamic stability: Monitor blood pressure and heart rate  Taken 10/11/2023 2203 by Bryant Brumfield RN  Maintains optimal cardiac output and hemodynamic stability:   Monitor blood pressure and heart rate   Monitor urine output and notify Licensed Independent Practitioner for values outside of normal range  Goal: Absence of cardiac dysrhythmias or at baseline  10/12/2023 1017 by Lynsey Tong LPN  Outcome: Progressing  10/12/2023 1010 by Lynsey Tong LPN  Outcome: Progressing  Flowsheets (Taken 10/12/2023 1004)  Absence of cardiac dysrhythmias or at baseline: Monitor cardiac rate and rhythm     Problem: Skin/Tissue Integrity - Adult  Goal: Skin integrity remains intact  10/12/2023 1017 by Lynsey Tong LPN  Outcome: Progressing  10/12/2023 1010 by Lynsey Tong LPN  Outcome: Progressing  Flowsheets  Taken 10/12/2023 1004 by Lynsey Tong LPN  Skin Integrity Remains Intact: Monitor for areas of redness and/or skin breakdown  Taken 10/11/2023 2203 by Viktoria Quezada RN  Skin Integrity Remains Intact: Monitor for areas of redness and/or skin breakdown     Problem: Musculoskeletal - Adult  Goal: Return mobility to safest level of function  10/12/2023 1017 by Dayami Reeves LPN  Outcome: Progressing  10/12/2023 1010 by Dayami Reeves LPN  Outcome: Progressing  Flowsheets  Taken 10/12/2023 1004 by Dayami Reeves LPN  Return Mobility to Safest Level of Function: Assess patient stability and activity tolerance for standing, transferring and ambulating with or without assistive devices  Taken 10/11/2023 2203 by Viktoria Quezada RN  Return Mobility to Safest Level of Function:   Assess patient stability and activity tolerance for standing, transferring and ambulating with or without assistive devices   Assist with transfers and ambulation using safe patient handling equipment as needed     Problem: Gastrointestinal - Adult  Goal: Minimal or absence of nausea and vomiting  10/12/2023 1017 by Dayami Reeves LPN  Outcome: Progressing  10/12/2023 1010 by Dayami Reeves LPN  Outcome: Progressing  Flowsheets  Taken 10/12/2023 1004 by Dayami Reeves LPN  Minimal or absence of nausea and vomiting: Administer IV fluids as ordered to ensure adequate hydration  Taken 10/11/2023 2203 by Viktoria Quezada RN  Minimal or absence of nausea and vomiting: Administer IV fluids as ordered to ensure adequate hydration  Goal: Maintains or returns to baseline bowel function  10/12/2023 1017 by Dayami Reeves LPN  Outcome: Progressing  10/12/2023 1010 by Dayami Reeves LPN  Outcome: Progressing  Flowsheets  Taken 10/12/2023 1004 by Dayami Reeves LPN  Maintains or returns to baseline bowel function: Assess bowel function  Taken 10/11/2023 2203 by imbalances  Taken 10/11/2023 2203 by Fifi Coley RN  Electrolytes maintained within normal limits:   Monitor labs and assess patient for signs and symptoms of electrolyte imbalances   Administer electrolyte replacement as ordered   Monitor response to electrolyte replacements, including repeat lab results as appropriate     Problem: Hematologic - Adult  Goal: Maintains hematologic stability  10/12/2023 1017 by Sugey Plata LPN  Outcome: Progressing  10/12/2023 1010 by Sugey Plata LPN  Outcome: Progressing  Flowsheets (Taken 10/12/2023 1004)  Maintains hematologic stability: Assess for signs and symptoms of bleeding or hemorrhage

## 2023-10-12 NOTE — OP NOTE
Operative Note      Patient: Ashlyn Boyd  YOB: 1975  MRN: [id number]    Date of Procedure: 10/12/2023    Pre-Op Diagnosis Codes: * Bladder tumor [D49.4]     * Hematuria, unspecified type [R31.9]    Post-Op Diagnosis: bladder cancer       Procedure(s):  CYSTOURETHROSCOPY, POSSIBLE TRANSURETHRAL RESECTION OF LARGE BLADDER CANCER    Surgeon(s):  Grzegorz Coelho MD    Assistant:   * No surgical staff found *    Anesthesia: General    Estimated Blood Loss (mL): less than 50     Complications: None    Specimens:   ID Type Source Tests Collected by Time Destination   1 : BLADDER CANCER Tissue Bladder SURGICAL PATHOLOGY Grzegorz Coelho MD 10/12/2023 1640        Implants:  * No implants in log *      Drains: * No LDAs found *    Findings: Patient had a large bladder cancer wrapped around the bladder neck from 9:00 to 3:00 on the trigone. Well over 15 cm in size. Detailed Description of Procedure:   Patient was prepped and draped in usual sterile fashion after undergoing general anesthesia placed lithotomy position patient was scoped with a 21 Palestinian cystoscope after timeout was performed patient had SCDs applied and preoperative antibiotics were given  Patient normal pendulous bulbous membranous urethra prostatic urethra into the whole bladder neck around the bladder cancer basically went up 3 9:00 tissue back into the trigone some. Heart D&C around the left-hand side back into the base. Did not see much on the dome of the bladder. Difficult to see around this cancer. So resectoscope was placed into the bladder for 30 degree lens which quit working through first through the resection. I resected superficially with deep very thick cut the surprised if its not invasive. I irrigated out all of the bladder cancer pieces. There is still some tissue left on the left-hand side of the bladder.   I was unable to resect as the 30 degree lens quit working and there was not a replacement lens

## 2023-10-12 NOTE — CARE COORDINATION
10/12/23 1404   Service Assessment   Patient Orientation Alert and Oriented   Cognition Alert   History Provided By Patient   Primary Ellen [place] Parent   Patient's Healthcare Decision Maker is: Legal Next of Kin   PCP Verified by CM No   Prior Functional Level Independent in ADLs/IADLs   Current Functional Level Independent in ADLs/IADLs   Can patient return to prior living arrangement Yes   Ability to make needs known: Good   Family able to assist with home care needs: Yes   Would you like for me to discuss the discharge plan with any other family members/significant others, and if so, who? No   Financial Resources [place] None   Social/Functional History   Lives With Parent   Type of [address] [place] [address]  Two level   Home Access Stairs to enter with rails   Entrance Stairs - Number of Steps 56 [address]    Ambulation Assistance Independent   Transfer Assistance Independent   Active  Yes   Mode of Transportation Car       CM met with patient to complete DCP assessment. Patient reports that he lives with his mother in a two story house with three steps to the entrance, address on chart confirmed. Patient states that he is completely independent in his ADL's and IADL's, requires no DME, and is an active . Patient is not current with a PCP but has made an appt that he missed coming to the hospital, however it was rescheduled. Patient utilizes [facility] in [place] for his medications, no reported issues obtaining his medications. CM continues to follow and monitor for needs.

## 2023-10-12 NOTE — ANESTHESIA POSTPROCEDURE EVALUATION
Department of Anesthesiology  Postprocedure Note    Patient: Shabnam Patton  MRN: [id number]  YOB: 1975  Date of evaluation: 10/12/2023      Procedure Summary     Date: 10/12/23 Room / Location: [facility] / [facility] OR    Anesthesia Start: 1543 Anesthesia Stop: 5883    Procedure: CYSTOURETHROSCOPY, POSSIBLE TRANSURETHRAL RESECTION OF LARGE BLADDER CANCER (Bladder) Diagnosis:       Bladder tumor      Hematuria, unspecified type      (Bladder tumor [D49.4])      (Hematuria, unspecified type [R31.9])    Surgeons: Shekhar Valente MD Responsible Provider: Lon Myles MD    Anesthesia Type: General ASA Status: 3          Anesthesia Type: General    Lul Phase I: Lul Score: 9    Lul Phase II:        Anesthesia Post Evaluation    Patient location during evaluation: PACU  Patient participation: complete - patient participated  Level of consciousness: sleepy but conscious  Pain score: 0  Airway patency: patent  Nausea & Vomiting: no nausea and no vomiting  Complications: no  Cardiovascular status: hemodynamically stable  Respiratory status: acceptable  Hydration status: stable  Multimodal analgesia pain management approach

## 2023-10-13 ENCOUNTER — APPOINTMENT (OUTPATIENT)
Facility: HOSPITAL | Age: 48
DRG: 446 | End: 2023-10-13
Payer: MEDICAID

## 2023-10-13 LAB
ABO + RH BLD: NORMAL
ALBUMIN SERPL-MCNC: 2.8 G/DL (ref 3.5–5)
ALBUMIN SERPL-MCNC: 2.8 G/DL (ref 3.5–5)
ALBUMIN/GLOB SERPL: 0.6 (ref 1.1–2.2)
ALBUMIN/GLOB SERPL: 0.6 (ref 1.1–2.2)
ALP SERPL-CCNC: 90 U/L (ref 45–117)
ALP SERPL-CCNC: 91 U/L (ref 45–117)
ALT SERPL-CCNC: 67 U/L (ref 12–78)
ALT SERPL-CCNC: 72 U/L (ref 12–78)
ANION GAP SERPL CALC-SCNC: 10 MMOL/L (ref 5–15)
ANION GAP SERPL CALC-SCNC: 8 MMOL/L (ref 5–15)
ARTERIAL PATENCY WRIST A: YES
AST SERPL W P-5'-P-CCNC: 37 U/L (ref 15–37)
AST SERPL W P-5'-P-CCNC: 38 U/L (ref 15–37)
BACTERIA SPEC CULT: ABNORMAL
BASE DEFICIT BLDA-SCNC: 4.9 MMOL/L
BASOPHILS # BLD: 0 K/UL (ref 0–0.1)
BASOPHILS # BLD: 0 K/UL (ref 0–0.1)
BASOPHILS NFR BLD: 0 % (ref 0–1)
BASOPHILS NFR BLD: 0 % (ref 0–1)
BDY SITE: ABNORMAL
BILIRUB SERPL-MCNC: 1 MG/DL (ref 0.2–1)
BILIRUB SERPL-MCNC: 1.2 MG/DL (ref 0.2–1)
BLOOD GROUP ANTIBODIES SERPL: NEGATIVE
BODY TEMPERATURE: 98.6
BUN SERPL-MCNC: 20 MG/DL (ref 6–20)
BUN SERPL-MCNC: 22 MG/DL (ref 6–20)
BUN/CREAT SERPL: 12 (ref 12–20)
BUN/CREAT SERPL: 13 (ref 12–20)
CA-I BLD-MCNC: 8.2 MG/DL (ref 8.5–10.1)
CA-I BLD-MCNC: 8.5 MG/DL (ref 8.5–10.1)
CHLORIDE SERPL-SCNC: 109 MMOL/L (ref 97–108)
CHLORIDE SERPL-SCNC: 109 MMOL/L (ref 97–108)
CO2 SERPL-SCNC: 20 MMOL/L (ref 21–32)
CO2 SERPL-SCNC: 20 MMOL/L (ref 21–32)
COHGB MFR BLD: 0.8 % (ref 1–2)
COLONY COUNT, CNT: ABNORMAL
COLONY COUNT, CNT: ABNORMAL
CREAT SERPL-MCNC: 1.52 MG/DL (ref 0.7–1.3)
CREAT SERPL-MCNC: 1.77 MG/DL (ref 0.7–1.3)
CRP SERPL-MCNC: 21.8 MG/DL (ref 0–0.6)
DIFFERENTIAL METHOD BLD: ABNORMAL
DIFFERENTIAL METHOD BLD: ABNORMAL
EOSINOPHIL # BLD: 0 K/UL (ref 0–0.4)
EOSINOPHIL # BLD: 0 K/UL (ref 0–0.4)
EOSINOPHIL NFR BLD: 0 % (ref 0–7)
EOSINOPHIL NFR BLD: 0 % (ref 0–7)
ERYTHROCYTE [DISTWIDTH] IN BLOOD BY AUTOMATED COUNT: 14 % (ref 11.5–14.5)
ERYTHROCYTE [DISTWIDTH] IN BLOOD BY AUTOMATED COUNT: 14.1 % (ref 11.5–14.5)
FIO2 ON VENT: 100 %
FLUAV RNA SPEC QL NAA+PROBE: NOT DETECTED
FLUBV RNA SPEC QL NAA+PROBE: NOT DETECTED
GLOBULIN SER CALC-MCNC: 4.6 G/DL (ref 2–4)
GLOBULIN SER CALC-MCNC: 4.7 G/DL (ref 2–4)
GLUCOSE SERPL-MCNC: 140 MG/DL (ref 65–100)
GLUCOSE SERPL-MCNC: 142 MG/DL (ref 65–100)
HCO3 BLDA-SCNC: 19 MMOL/L (ref 22–26)
HCT VFR BLD AUTO: 37.6 % (ref 36.6–50.3)
HCT VFR BLD AUTO: 38.4 % (ref 36.6–50.3)
HGB BLD-MCNC: 12.7 G/DL (ref 12.1–17)
HGB BLD-MCNC: 13.2 G/DL (ref 12.1–17)
IMM GRANULOCYTES # BLD AUTO: 0 K/UL
IMM GRANULOCYTES # BLD AUTO: 0.1 K/UL (ref 0–0.04)
IMM GRANULOCYTES NFR BLD AUTO: 0 %
IMM GRANULOCYTES NFR BLD AUTO: 1 % (ref 0–0.5)
LYMPHOCYTES # BLD: 0.7 K/UL (ref 0.8–3.5)
LYMPHOCYTES # BLD: 0.8 K/UL (ref 0.8–3.5)
LYMPHOCYTES NFR BLD: 10 % (ref 12–49)
LYMPHOCYTES NFR BLD: 7 % (ref 12–49)
Lab: ABNORMAL
MCH RBC QN AUTO: 30.5 PG (ref 26–34)
MCH RBC QN AUTO: 30.6 PG (ref 26–34)
MCHC RBC AUTO-ENTMCNC: 33.8 G/DL (ref 30–36.5)
MCHC RBC AUTO-ENTMCNC: 34.4 G/DL (ref 30–36.5)
MCV RBC AUTO: 88.9 FL (ref 80–99)
MCV RBC AUTO: 90.4 FL (ref 80–99)
METHGB MFR BLD: 0.3 % (ref 0–1.4)
MONOCYTES # BLD: 0.7 K/UL (ref 0–1)
MONOCYTES # BLD: 0.8 K/UL (ref 0–1)
MONOCYTES NFR BLD: 10 % (ref 5–13)
MONOCYTES NFR BLD: 7 % (ref 5–13)
NEUTS BAND NFR BLD MANUAL: 2 % (ref 0–6)
NEUTS SEG # BLD: 6.8 K/UL (ref 1.8–8)
NEUTS SEG # BLD: 8.1 K/UL (ref 1.8–8)
NEUTS SEG NFR BLD: 78 % (ref 32–75)
NEUTS SEG NFR BLD: 85 % (ref 32–75)
NRBC # BLD: 0 K/UL (ref 0–0.01)
NRBC # BLD: 0 K/UL (ref 0–0.01)
NRBC BLD-RTO: 0 PER 100 WBC
NRBC BLD-RTO: 0 PER 100 WBC
OXYHGB MFR BLD: 88.9 % (ref 95–99)
PCO2 BLDA: 32 MMHG (ref 35–45)
PERFORMED BY:: ABNORMAL
PH BLDA: 7.39 (ref 7.35–7.45)
PLATELET # BLD AUTO: 147 K/UL (ref 150–400)
PLATELET # BLD AUTO: 156 K/UL (ref 150–400)
PMV BLD AUTO: 10 FL (ref 8.9–12.9)
PMV BLD AUTO: 10.3 FL (ref 8.9–12.9)
PO2 BLDA: 57 MMHG (ref 80–100)
POTASSIUM SERPL-SCNC: 3.5 MMOL/L (ref 3.5–5.1)
POTASSIUM SERPL-SCNC: 3.9 MMOL/L (ref 3.5–5.1)
PROCALCITONIN SERPL-MCNC: 1.83 NG/ML
PROT SERPL-MCNC: 7.4 G/DL (ref 6.4–8.2)
PROT SERPL-MCNC: 7.5 G/DL (ref 6.4–8.2)
RBC # BLD AUTO: 4.16 M/UL (ref 4.1–5.7)
RBC # BLD AUTO: 4.32 M/UL (ref 4.1–5.7)
RBC MORPH BLD: ABNORMAL
SAO2 % BLD: 90 % (ref 95–99)
SAO2% DEVICE SAO2% SENSOR NAME: ABNORMAL
SARS-COV-2 RNA RESP QL NAA+PROBE: NOT DETECTED
SODIUM SERPL-SCNC: 137 MMOL/L (ref 136–145)
SODIUM SERPL-SCNC: 139 MMOL/L (ref 136–145)
SPECIMEN EXP DATE BLD: NORMAL
SPECIMEN SITE: ABNORMAL
TROPONIN I SERPL HS-MCNC: 72 NG/L (ref 0–76)
VANCOMYCIN SERPL-MCNC: 16.7 UG/ML
WBC # BLD AUTO: 8.4 K/UL (ref 4.1–11.1)
WBC # BLD AUTO: 9.6 K/UL (ref 4.1–11.1)

## 2023-10-13 PROCEDURE — 71275 CT ANGIOGRAPHY CHEST: CPT

## 2023-10-13 PROCEDURE — 6360000002 HC RX W HCPCS: Performed by: STUDENT IN AN ORGANIZED HEALTH CARE EDUCATION/TRAINING PROGRAM

## 2023-10-13 PROCEDURE — 6370000000 HC RX 637 (ALT 250 FOR IP): Performed by: STUDENT IN AN ORGANIZED HEALTH CARE EDUCATION/TRAINING PROGRAM

## 2023-10-13 PROCEDURE — 85025 COMPLETE CBC W/AUTO DIFF WBC: CPT

## 2023-10-13 PROCEDURE — 6370000000 HC RX 637 (ALT 250 FOR IP): Performed by: EMERGENCY MEDICINE

## 2023-10-13 PROCEDURE — 6360000002 HC RX W HCPCS: Performed by: PHYSICIAN ASSISTANT

## 2023-10-13 PROCEDURE — 94660 CPAP INITIATION&MGMT: CPT

## 2023-10-13 PROCEDURE — 84484 ASSAY OF TROPONIN QUANT: CPT

## 2023-10-13 PROCEDURE — 71045 X-RAY EXAM CHEST 1 VIEW: CPT

## 2023-10-13 PROCEDURE — 99232 SBSQ HOSP IP/OBS MODERATE 35: CPT | Performed by: NURSE PRACTITIONER

## 2023-10-13 PROCEDURE — 6360000004 HC RX CONTRAST MEDICATION: Performed by: STUDENT IN AN ORGANIZED HEALTH CARE EDUCATION/TRAINING PROGRAM

## 2023-10-13 PROCEDURE — 84145 PROCALCITONIN (PCT): CPT

## 2023-10-13 PROCEDURE — 6370000000 HC RX 637 (ALT 250 FOR IP): Performed by: UROLOGY

## 2023-10-13 PROCEDURE — 5A09357 ASSISTANCE WITH RESPIRATORY VENTILATION, LESS THAN 24 CONSECUTIVE HOURS, CONTINUOUS POSITIVE AIRWAY PRESSURE: ICD-10-PCS | Performed by: INTERNAL MEDICINE

## 2023-10-13 PROCEDURE — 73700 CT LOWER EXTREMITY W/O DYE: CPT

## 2023-10-13 PROCEDURE — 86901 BLOOD TYPING SEROLOGIC RH(D): CPT

## 2023-10-13 PROCEDURE — 36600 WITHDRAWAL OF ARTERIAL BLOOD: CPT

## 2023-10-13 PROCEDURE — 2580000003 HC RX 258: Performed by: STUDENT IN AN ORGANIZED HEALTH CARE EDUCATION/TRAINING PROGRAM

## 2023-10-13 PROCEDURE — 2100000000 HC CCU R&B

## 2023-10-13 PROCEDURE — 80202 ASSAY OF VANCOMYCIN: CPT

## 2023-10-13 PROCEDURE — 87636 SARSCOV2 & INF A&B AMP PRB: CPT

## 2023-10-13 PROCEDURE — 6360000002 HC RX W HCPCS: Performed by: INTERNAL MEDICINE

## 2023-10-13 PROCEDURE — 80053 COMPREHEN METABOLIC PANEL: CPT

## 2023-10-13 PROCEDURE — 82803 BLOOD GASES ANY COMBINATION: CPT

## 2023-10-13 PROCEDURE — 36415 COLL VENOUS BLD VENIPUNCTURE: CPT

## 2023-10-13 PROCEDURE — 86850 RBC ANTIBODY SCREEN: CPT

## 2023-10-13 PROCEDURE — 93005 ELECTROCARDIOGRAM TRACING: CPT | Performed by: STUDENT IN AN ORGANIZED HEALTH CARE EDUCATION/TRAINING PROGRAM

## 2023-10-13 PROCEDURE — 2580000003 HC RX 258: Performed by: INTERNAL MEDICINE

## 2023-10-13 PROCEDURE — 86900 BLOOD TYPING SEROLOGIC ABO: CPT

## 2023-10-13 PROCEDURE — 86140 C-REACTIVE PROTEIN: CPT

## 2023-10-13 PROCEDURE — 2000000000 HC ICU R&B

## 2023-10-13 RX ORDER — FUROSEMIDE 10 MG/ML
80 INJECTION INTRAMUSCULAR; INTRAVENOUS ONCE
Status: COMPLETED | OUTPATIENT
Start: 2023-10-13 | End: 2023-10-13

## 2023-10-13 RX ORDER — LORAZEPAM 2 MG/ML
0.5 INJECTION INTRAMUSCULAR EVERY 6 HOURS PRN
Status: DISCONTINUED | OUTPATIENT
Start: 2023-10-13 | End: 2023-10-20 | Stop reason: HOSPADM

## 2023-10-13 RX ORDER — FUROSEMIDE 10 MG/ML
60 INJECTION INTRAMUSCULAR; INTRAVENOUS 2 TIMES DAILY
Status: DISCONTINUED | OUTPATIENT
Start: 2023-10-13 | End: 2023-10-14

## 2023-10-13 RX ORDER — FUROSEMIDE 10 MG/ML
40 INJECTION INTRAMUSCULAR; INTRAVENOUS 2 TIMES DAILY
Status: DISCONTINUED | OUTPATIENT
Start: 2023-10-13 | End: 2023-10-13

## 2023-10-13 RX ORDER — FUROSEMIDE 10 MG/ML
20 INJECTION INTRAMUSCULAR; INTRAVENOUS ONCE
Status: DISCONTINUED | OUTPATIENT
Start: 2023-10-13 | End: 2023-10-13

## 2023-10-13 RX ADMIN — FUROSEMIDE 60 MG: 10 INJECTION, SOLUTION INTRAMUSCULAR; INTRAVENOUS at 17:44

## 2023-10-13 RX ADMIN — ACETAMINOPHEN 650 MG: 325 TABLET ORAL at 15:26

## 2023-10-13 RX ADMIN — OXYCODONE AND ACETAMINOPHEN 1 TABLET: 5; 325 TABLET ORAL at 07:51

## 2023-10-13 RX ADMIN — OXYCODONE AND ACETAMINOPHEN 1 TABLET: 5; 325 TABLET ORAL at 15:31

## 2023-10-13 RX ADMIN — IOPAMIDOL 100 ML: 755 INJECTION, SOLUTION INTRAVENOUS at 13:52

## 2023-10-13 RX ADMIN — Medication 95 MG: at 17:44

## 2023-10-13 RX ADMIN — PIPERACILLIN AND TAZOBACTAM 3375 MG: 3; .375 INJECTION, POWDER, LYOPHILIZED, FOR SOLUTION INTRAVENOUS at 21:47

## 2023-10-13 RX ADMIN — PIPERACILLIN AND TAZOBACTAM 3375 MG: 3; .375 INJECTION, POWDER, LYOPHILIZED, FOR SOLUTION INTRAVENOUS at 08:53

## 2023-10-13 RX ADMIN — Medication 95 MG: at 08:54

## 2023-10-13 RX ADMIN — SODIUM CHLORIDE: 9 INJECTION, SOLUTION INTRAVENOUS at 08:57

## 2023-10-13 RX ADMIN — SODIUM CHLORIDE, PRESERVATIVE FREE 10 ML: 5 INJECTION INTRAVENOUS at 08:54

## 2023-10-13 RX ADMIN — VANCOMYCIN HYDROCHLORIDE 1250 MG: 1.25 INJECTION, POWDER, LYOPHILIZED, FOR SOLUTION INTRAVENOUS at 08:54

## 2023-10-13 RX ADMIN — HYDROCORTISONE: 0.01 CREAM TOPICAL at 08:55

## 2023-10-13 RX ADMIN — LORAZEPAM 0.5 MG: 2 INJECTION INTRAMUSCULAR; INTRAVENOUS at 15:07

## 2023-10-13 RX ADMIN — TAMSULOSIN HYDROCHLORIDE 0.4 MG: 0.4 CAPSULE ORAL at 08:54

## 2023-10-13 RX ADMIN — KETOROLAC TROMETHAMINE 15 MG: 15 INJECTION, SOLUTION INTRAMUSCULAR; INTRAVENOUS at 10:13

## 2023-10-13 RX ADMIN — PIPERACILLIN AND TAZOBACTAM 3375 MG: 3; .375 INJECTION, POWDER, LYOPHILIZED, FOR SOLUTION INTRAVENOUS at 15:08

## 2023-10-13 RX ADMIN — SODIUM CHLORIDE, PRESERVATIVE FREE 10 ML: 5 INJECTION INTRAVENOUS at 21:50

## 2023-10-13 RX ADMIN — OXYCODONE AND ACETAMINOPHEN 1 TABLET: 5; 325 TABLET ORAL at 21:48

## 2023-10-13 RX ADMIN — FUROSEMIDE 80 MG: 10 INJECTION, SOLUTION INTRAMUSCULAR; INTRAVENOUS at 12:53

## 2023-10-13 RX ADMIN — KETOROLAC TROMETHAMINE 15 MG: 15 INJECTION, SOLUTION INTRAMUSCULAR; INTRAVENOUS at 04:43

## 2023-10-13 ASSESSMENT — PAIN SCALES - GENERAL
PAINLEVEL_OUTOF10: 7
PAINLEVEL_OUTOF10: 10
PAINLEVEL_OUTOF10: 7
PAINLEVEL_OUTOF10: 8

## 2023-10-13 ASSESSMENT — PAIN DESCRIPTION - LOCATION
LOCATION: PENIS
LOCATION: ABDOMEN;CHEST

## 2023-10-13 ASSESSMENT — ENCOUNTER SYMPTOMS
SHORTNESS OF BREATH: 0
COUGH: 1
CHEST TIGHTNESS: 1
ABDOMINAL PAIN: 0
SHORTNESS OF BREATH: 1
ABDOMINAL DISTENTION: 0
DIARRHEA: 0
NAUSEA: 0
BACK PAIN: 1
VOMITING: 0

## 2023-10-13 NOTE — PROGRESS NOTES
RRT called on patient due to low oxygen level, NP present at bedside patient tachypneic, hemoptysis noted patient sent to CT per orders and then ICU due to higher level of care needed.

## 2023-10-13 NOTE — PROGRESS NOTES
UROLOGY Progress Note         [phone]      Daily Progress Note: 10/13/2023    Subjective: The patient is seen for UROLOGIC follow up. He is s/p TURBT (incomplete resection) on 10/12/23. He had an extensive tumor burden, not resected on the left side of the bladder. Overnight, he developed gross hematuria and the walker catheter was not draining. Nursing attempting to flush/aspirate and could not. He was leaking large amounts of urine and was in considerable 10/10 pain. I removed the catheter and inserted a 24F. It was manually irrigated until it was flowing pink tinged/yellow, clear urine with no evidence of clot. No CBI should be initiated on this patient. The irrigation port is clamped with a yellow tube. Pt c/o hemoptysis post operatively. Primary team notified.   Problem List:  Patient Active Problem List   Diagnosis    SIRS (systemic inflammatory response syndrome) (HCC)    Malignant neoplasm of overlapping sites of bladder (HCC)    Hematuria       Medications reviewed  Current Facility-Administered Medications   Medication Dose Route Frequency    vancomycin (VANCOCIN) 1,250 mg in sodium chloride 0.9 % 250 mL IVPB ([id number])  1,250 mg IntraVENous Q8H    VANCOMYCIN RANDOM LEVEL DUE ON 10/13 AT 1900   Other Once    hydrocortisone 1 % cream   Topical BID    phenazopyridine (PYRIDIUM) tablet 95 mg  95 mg Oral TID WC    oxyCODONE-acetaminophen (PERCOCET) 5-325 MG per tablet 1 tablet  1 tablet Oral Q6H PRN    diatrizoate meglumine-sodium (GASTROGRAFIN) 66-10 % solution 30 mL  30 mL Oral ONCE PRN    tamsulosin (FLOMAX) capsule 0.4 mg  0.4 mg Oral Daily    sodium chloride flush 0.9 % injection 5-40 mL  5-40 mL IntraVENous 2 times per day    sodium chloride flush 0.9 % injection 5-40 mL  5-40 mL IntraVENous PRN    0.9 % sodium chloride infusion   IntraVENous PRN    ondansetron (ZOFRAN-ODT) disintegrating tablet 4 mg  4 mg Oral Q8H PRN    Or    ondansetron (ZOFRAN) injection 4 mg  4 Notify MD if not draining. Discussed with Dr. Lisa West, Attending physician. SARY Bentley - NP    Please note that portions of this note were completed with Dragon dictation, the computer voice recognition software. Quite often unanticipated grammatical, syntax, homophones, and other interpretive errors are inadvertently transcribed by the computer software. Please disregard these errors and any other errors that may have escaped final proofreading. Thank you.

## 2023-10-13 NOTE — PROGRESS NOTES
RRT initiated on patient due to hypoxia and tachypnea. Patient with hemoptysis. Patient initially placed on nonrebreather and escalated to BiPAP given persistent hypoxia. Concern for post-operative obstruction after cystoscopy with TURBT yesterday, possible aspiration. Patient on IV Vanc and Zosyn   Obtained stat chest x-ray, ABG, CBC, CMP, type and screen. Stat CTA of the chest previously ordered   Chest x-ray concerning for pulmonary edema, stat IV Lasix 80 mg  Subsequent improvement of hypoxia with BiPAP   Pulmonology consulted  Obtain Echo, troponin, EKG. Patient transferred to ICU for further management and continuous BiPAP.

## 2023-10-13 NOTE — PROGRESS NOTES
Phone call made by patient request that we inform mother Vivienne Chaves of patient being transferred to ICU. Called and spoke with Chen Le with verbalizing understanding that patient will be in ICU when she arrives.

## 2023-10-13 NOTE — PROGRESS NOTES
Patient iv came out. Several attempts made including by supervisor. Attending notified.  Telephone order received for consult to PICC team.

## 2023-10-13 NOTE — WOUND CARE
WCN consulted for right leg edema and erythema    Patient resting in bed, states that leg is tender and does not feel like it is getting better. Patient skin is intact to right lower leg, no weeping noted at this time. Recommend elevating leg on 2-3 pillows to help with edema, please re-consult if wounds appear or legs starts weeping/draining.

## 2023-10-13 NOTE — PROGRESS NOTES
Hospitalist Progress Note    NAME:   Lady Rhodes   : 1975   MRN: [id number]     Date/Time: 10/13/2023 12:05 PM  Patient PCP: No primary care provider on file. Estimated discharge date: 48 hours  Barriers: CTA of the chest, RLE CT, IV antibiotics. Hospital course:   Lady Rhodes is a 50 y.o.  male with no significant past medical history besides umbilical hernia who presented to the emergency department with multiple complaints being lower back pain, chills, myalgias, right lower leg redness and pain, abdominal pain in the area of local hernia. Initial ED work-up concerning with leukocytosis of 18,000. Patient meeting SIRS criteria with tachycardia and leukocytosis. Blood cultures pending. Unclear source but suspected related to right ankle cellulitis. Venous duplex ultrasound negative for DVT but noted right lower extremity lymphadenopathy. CT abdomen pelvis with small fat-containing umbilical hernia with mild inflammation, urinary bladder mass versus protrusion of enlarged prostate. General surgery consulted, Dr. Katz whom patient scheduled for surgery later this month, can continue outpatient surgery. CRP and procalcitonin elevated, being treated with IV Vanc and Zosyn. Urology consulted for possible bladder mass. Patient underwent cystoscopy with TURBT 10/12, highly suspicious for cancer, pending pathology. Patient developed hemoptysis following procedure given high likelihood of malignancy and persistent tachycardia will obtain CTA of the chest to rule out pulmonary embolism. We will also obtain right lower extremity CT to rule out possible abscess given persistent pain and swelling, and patient has been febrile.      Assessment / Plan:    SIRS, unclear source of infection, suspected right ankle cellulitis  IV antibiotics, continue IV vancomycin and Zosyn  Blood cultures pending, no growth after 2 days  IV fluids  Elevated procal and CRP   Initial chest x-ray with no acute breath.         + Hemoptysis   Cardiovascular:  Positive for leg swelling (RLE + pain). Negative for chest pain and palpitations. Gastrointestinal:  Negative for abdominal pain, diarrhea, nausea and vomiting. Genitourinary:  Negative for difficulty urinating and dysuria. Musculoskeletal:  Positive for back pain and myalgias. Negative for neck pain. Skin:  Negative for rash. Neurological:  Negative for weakness, numbness and headaches. Psychiatric/Behavioral:  Negative for confusion. PHYSICAL EXAM:  Physical Exam  Vitals reviewed. Constitutional:       General: He is not in acute distress. Appearance: He is ill-appearing and diaphoretic. HENT:      Head: Normocephalic and atraumatic. Eyes:      Conjunctiva/sclera: Conjunctivae normal.   Cardiovascular:      Rate and Rhythm: Regular rhythm. Tachycardia present. Heart sounds: Normal heart sounds. Pulmonary:      Effort: Pulmonary effort is normal. No respiratory distress. Breath sounds: Normal breath sounds. No wheezing or rales. Comments: Actively coughing blood on examination  Abdominal:      Palpations: Abdomen is soft. Hernia: A hernia is present. Comments: Protuberant abdomen  Tenderness near umbilical hernia     Skin:     General: Skin is warm. Comments: Area of erythema, warmth, tenderness to palpation over the right ankle. Neurological:      Mental Status: He is alert and oriented to person, place, and time.           Reviewed most current lab test results and cultures  YES  Reviewed most current radiology test results   YES  Review and summation of old records today    NO  Reviewed patient's current orders and MAR    YES  PMH/SH reviewed - no change compared to H&P  ________________________________________________________________________  Care Plan discussed with:    Comments   Patient x    Family      RN x    Care Manager     Consultant                        Multidiciplinary team rounds were

## 2023-10-14 ENCOUNTER — APPOINTMENT (OUTPATIENT)
Facility: HOSPITAL | Age: 48
DRG: 446 | End: 2023-10-14
Payer: MEDICAID

## 2023-10-14 PROBLEM — D72.829 LEUKOCYTOSIS: Status: ACTIVE | Noted: 2023-10-14

## 2023-10-14 LAB
ALBUMIN SERPL-MCNC: 2.6 G/DL (ref 3.5–5)
ALBUMIN/GLOB SERPL: 0.5 (ref 1.1–2.2)
ALP SERPL-CCNC: 94 U/L (ref 45–117)
ALT SERPL-CCNC: 73 U/L (ref 12–78)
ANION GAP SERPL CALC-SCNC: 5 MMOL/L (ref 5–15)
ARTERIAL PATENCY WRIST A: YES
AST SERPL W P-5'-P-CCNC: 41 U/L (ref 15–37)
BASE DEFICIT BLDA-SCNC: 2.3 MMOL/L
BASOPHILS # BLD: 0 K/UL (ref 0–0.1)
BASOPHILS NFR BLD: 0 % (ref 0–1)
BDY SITE: ABNORMAL
BILIRUB SERPL-MCNC: 1.3 MG/DL (ref 0.2–1)
BNP SERPL-MCNC: 293 PG/ML
BODY TEMPERATURE: 98
BUN SERPL-MCNC: 26 MG/DL (ref 6–20)
BUN/CREAT SERPL: 10 (ref 12–20)
CA-I BLD-MCNC: 8.4 MG/DL (ref 8.5–10.1)
CHLORIDE SERPL-SCNC: 107 MMOL/L (ref 97–108)
CO2 SERPL-SCNC: 24 MMOL/L (ref 21–32)
COHGB MFR BLD: 0.5 % (ref 1–2)
CREAT SERPL-MCNC: 2.49 MG/DL (ref 0.7–1.3)
CRP SERPL-MCNC: 36.3 MG/DL (ref 0–0.6)
DIFFERENTIAL METHOD BLD: ABNORMAL
EKG ATRIAL RATE: 105 BPM
EKG DIAGNOSIS: NORMAL
EKG P AXIS: 25 DEGREES
EKG P-R INTERVAL: 140 MS
EKG Q-T INTERVAL: 332 MS
EKG QRS DURATION: 90 MS
EKG QTC CALCULATION (BAZETT): 438 MS
EKG R AXIS: 23 DEGREES
EKG T AXIS: 20 DEGREES
EKG VENTRICULAR RATE: 105 BPM
EOSINOPHIL # BLD: 0 K/UL (ref 0–0.4)
EOSINOPHIL NFR BLD: 0 % (ref 0–7)
ERYTHROCYTE [DISTWIDTH] IN BLOOD BY AUTOMATED COUNT: 14.3 % (ref 11.5–14.5)
EST. AVERAGE GLUCOSE BLD GHB EST-MCNC: 105 MG/DL
FIO2 ON VENT: 65 %
GAS FLOW.O2 O2 DELIVERY SYS: 50 L/MIN
GLOBULIN SER CALC-MCNC: 4.9 G/DL (ref 2–4)
GLUCOSE SERPL-MCNC: 149 MG/DL (ref 65–100)
HBA1C MFR BLD: 5.3 % (ref 4–5.6)
HCO3 BLDA-SCNC: 22 MMOL/L (ref 22–26)
HCT VFR BLD AUTO: 35.8 % (ref 36.6–50.3)
HGB BLD-MCNC: 12.2 G/DL (ref 12.1–17)
IMM GRANULOCYTES # BLD AUTO: 0.1 K/UL (ref 0–0.04)
IMM GRANULOCYTES NFR BLD AUTO: 1 % (ref 0–0.5)
LYMPHOCYTES # BLD: 1 K/UL (ref 0.8–3.5)
LYMPHOCYTES NFR BLD: 10 % (ref 12–49)
MCH RBC QN AUTO: 30.2 PG (ref 26–34)
MCHC RBC AUTO-ENTMCNC: 34.1 G/DL (ref 30–36.5)
MCV RBC AUTO: 88.6 FL (ref 80–99)
METHGB MFR BLD: 0.4 % (ref 0–1.4)
MONOCYTES # BLD: 0.8 K/UL (ref 0–1)
MONOCYTES NFR BLD: 8 % (ref 5–13)
NEUTS SEG # BLD: 7.9 K/UL (ref 1.8–8)
NEUTS SEG NFR BLD: 81 % (ref 32–75)
NRBC # BLD: 0 K/UL (ref 0–0.01)
NRBC BLD-RTO: 0 PER 100 WBC
OXYHGB MFR BLD: 89.7 % (ref 95–99)
PCO2 BLDA: 34 MMHG (ref 35–45)
PERFORMED BY:: ABNORMAL
PH BLDA: 7.42 (ref 7.35–7.45)
PLATELET # BLD AUTO: 173 K/UL (ref 150–400)
PMV BLD AUTO: 10.3 FL (ref 8.9–12.9)
PO2 BLDA: 56 MMHG (ref 80–100)
POTASSIUM SERPL-SCNC: 3.4 MMOL/L (ref 3.5–5.1)
PROCALCITONIN SERPL-MCNC: 3.11 NG/ML
PROT SERPL-MCNC: 7.5 G/DL (ref 6.4–8.2)
RBC # BLD AUTO: 4.04 M/UL (ref 4.1–5.7)
SAO2 % BLD: 91 % (ref 95–99)
SAO2% DEVICE SAO2% SENSOR NAME: ABNORMAL
SODIUM SERPL-SCNC: 136 MMOL/L (ref 136–145)
SPECIMEN SITE: ABNORMAL
WBC # BLD AUTO: 9.7 K/UL (ref 4.1–11.1)

## 2023-10-14 PROCEDURE — C8929 TTE W OR WO FOL WCON,DOPPLER: HCPCS

## 2023-10-14 PROCEDURE — 2000000000 HC ICU R&B

## 2023-10-14 PROCEDURE — 2700000000 HC OXYGEN THERAPY PER DAY

## 2023-10-14 PROCEDURE — 6360000002 HC RX W HCPCS: Performed by: INTERNAL MEDICINE

## 2023-10-14 PROCEDURE — 6370000000 HC RX 637 (ALT 250 FOR IP): Performed by: STUDENT IN AN ORGANIZED HEALTH CARE EDUCATION/TRAINING PROGRAM

## 2023-10-14 PROCEDURE — 85025 COMPLETE CBC W/AUTO DIFF WBC: CPT

## 2023-10-14 PROCEDURE — 82803 BLOOD GASES ANY COMBINATION: CPT

## 2023-10-14 PROCEDURE — 80053 COMPREHEN METABOLIC PANEL: CPT

## 2023-10-14 PROCEDURE — 6360000002 HC RX W HCPCS: Performed by: STUDENT IN AN ORGANIZED HEALTH CARE EDUCATION/TRAINING PROGRAM

## 2023-10-14 PROCEDURE — 84145 PROCALCITONIN (PCT): CPT

## 2023-10-14 PROCEDURE — 83036 HEMOGLOBIN GLYCOSYLATED A1C: CPT

## 2023-10-14 PROCEDURE — 87040 BLOOD CULTURE FOR BACTERIA: CPT

## 2023-10-14 PROCEDURE — 2100000000 HC CCU R&B

## 2023-10-14 PROCEDURE — 71045 X-RAY EXAM CHEST 1 VIEW: CPT

## 2023-10-14 PROCEDURE — 2580000003 HC RX 258: Performed by: STUDENT IN AN ORGANIZED HEALTH CARE EDUCATION/TRAINING PROGRAM

## 2023-10-14 PROCEDURE — 6370000000 HC RX 637 (ALT 250 FOR IP): Performed by: INTERNAL MEDICINE

## 2023-10-14 PROCEDURE — 6370000000 HC RX 637 (ALT 250 FOR IP): Performed by: UROLOGY

## 2023-10-14 PROCEDURE — 94761 N-INVAS EAR/PLS OXIMETRY MLT: CPT

## 2023-10-14 PROCEDURE — 36600 WITHDRAWAL OF ARTERIAL BLOOD: CPT

## 2023-10-14 PROCEDURE — 36415 COLL VENOUS BLD VENIPUNCTURE: CPT

## 2023-10-14 PROCEDURE — 6370000000 HC RX 637 (ALT 250 FOR IP): Performed by: EMERGENCY MEDICINE

## 2023-10-14 PROCEDURE — 83880 ASSAY OF NATRIURETIC PEPTIDE: CPT

## 2023-10-14 PROCEDURE — 86140 C-REACTIVE PROTEIN: CPT

## 2023-10-14 PROCEDURE — 6360000004 HC RX CONTRAST MEDICATION

## 2023-10-14 RX ORDER — POTASSIUM CHLORIDE 20 MEQ/1
40 TABLET, EXTENDED RELEASE ORAL DAILY
Status: DISCONTINUED | OUTPATIENT
Start: 2023-10-14 | End: 2023-10-17

## 2023-10-14 RX ORDER — CHOLECALCIFEROL (VITAMIN D3) 125 MCG
5 CAPSULE ORAL NIGHTLY PRN
Status: DISCONTINUED | OUTPATIENT
Start: 2023-10-14 | End: 2023-10-20 | Stop reason: HOSPADM

## 2023-10-14 RX ORDER — FUROSEMIDE 10 MG/ML
40 INJECTION INTRAMUSCULAR; INTRAVENOUS 2 TIMES DAILY
Status: DISCONTINUED | OUTPATIENT
Start: 2023-10-15 | End: 2023-10-20

## 2023-10-14 RX ADMIN — TAMSULOSIN HYDROCHLORIDE 0.4 MG: 0.4 CAPSULE ORAL at 09:22

## 2023-10-14 RX ADMIN — PERFLUTREN 2 ML: 6.52 INJECTION, SUSPENSION INTRAVENOUS at 18:56

## 2023-10-14 RX ADMIN — FUROSEMIDE 60 MG: 10 INJECTION, SOLUTION INTRAMUSCULAR; INTRAVENOUS at 09:21

## 2023-10-14 RX ADMIN — PIPERACILLIN AND TAZOBACTAM 3375 MG: 3; .375 INJECTION, POWDER, LYOPHILIZED, FOR SOLUTION INTRAVENOUS at 13:45

## 2023-10-14 RX ADMIN — SODIUM CHLORIDE, PRESERVATIVE FREE 10 ML: 5 INJECTION INTRAVENOUS at 09:33

## 2023-10-14 RX ADMIN — HYDROCORTISONE: 0.01 CREAM TOPICAL at 09:33

## 2023-10-14 RX ADMIN — Medication 95 MG: at 17:59

## 2023-10-14 RX ADMIN — ACETAMINOPHEN 650 MG: 325 TABLET ORAL at 02:12

## 2023-10-14 RX ADMIN — Medication 95 MG: at 09:22

## 2023-10-14 RX ADMIN — ACETAMINOPHEN 650 MG: 325 TABLET ORAL at 13:47

## 2023-10-14 RX ADMIN — PIPERACILLIN AND TAZOBACTAM 3375 MG: 3; .375 INJECTION, POWDER, LYOPHILIZED, FOR SOLUTION INTRAVENOUS at 21:27

## 2023-10-14 RX ADMIN — Medication 95 MG: at 13:43

## 2023-10-14 RX ADMIN — OXYCODONE AND ACETAMINOPHEN 1 TABLET: 5; 325 TABLET ORAL at 21:27

## 2023-10-14 RX ADMIN — POTASSIUM CHLORIDE 40 MEQ: 1500 TABLET, EXTENDED RELEASE ORAL at 13:44

## 2023-10-14 RX ADMIN — Medication 5 MG: at 21:27

## 2023-10-14 RX ADMIN — FUROSEMIDE 60 MG: 10 INJECTION, SOLUTION INTRAMUSCULAR; INTRAVENOUS at 17:59

## 2023-10-14 RX ADMIN — HYDROCORTISONE: 0.01 CREAM TOPICAL at 21:28

## 2023-10-14 RX ADMIN — Medication 1500 MG: at 06:21

## 2023-10-14 RX ADMIN — PIPERACILLIN AND TAZOBACTAM 3375 MG: 3; .375 INJECTION, POWDER, LYOPHILIZED, FOR SOLUTION INTRAVENOUS at 05:44

## 2023-10-14 RX ADMIN — SODIUM CHLORIDE, PRESERVATIVE FREE 10 ML: 5 INJECTION INTRAVENOUS at 21:29

## 2023-10-14 ASSESSMENT — PAIN SCALES - GENERAL
PAINLEVEL_OUTOF10: 0

## 2023-10-14 ASSESSMENT — ENCOUNTER SYMPTOMS
NAUSEA: 0
VOMITING: 0
DIARRHEA: 0
SHORTNESS OF BREATH: 1
ABDOMINAL PAIN: 0
COUGH: 1

## 2023-10-14 NOTE — PROGRESS NOTES
Hospitalist Progress Note    NAME:   David Belle   : 1975   MRN: [id number]     Date/Time: 10/14/2023 9:02 AM  Patient PCP: No primary care provider on file. Estimated discharge date: 48 hours  Barriers: Improvement in respiratory status, IV diuresis, repeat blood cultures. Hospital course:   David Belle is a 50 y.o.  male with no significant past medical history besides umbilical hernia who presented to the emergency department with multiple complaints being lower back pain, chills, myalgias, right lower leg redness and pain, abdominal pain in the area of local hernia. Initial ED work-up concerning with leukocytosis of 18,000. Patient meeting SIRS criteria with tachycardia and leukocytosis. Blood cultures pending. Unclear source but suspected related to right ankle cellulitis. Venous duplex ultrasound negative for DVT but noted right lower extremity lymphadenopathy. CT abdomen pelvis with small fat-containing umbilical hernia with mild inflammation, urinary bladder mass versus protrusion of enlarged prostate. General surgery consulted, Dr. Katz whom patient scheduled for surgery later this month, can continue outpatient surgery. CRP and procalcitonin elevated, being treated with IV Vanc and Zosyn. Urology consulted for possible bladder mass. Patient underwent cystoscopy with TURBT 10/12, highly suspicious for cancer, pending pathology. RRT 10/13 for hypoxia. Patient required BiPAP, transferred to the ICU for additional management. ABG with hypoxia and hypercarbia. Chest x-ray found with significant pulmonary edema, IV diuresis started. Pulmonology consulted. CTA with extensive multilobar airspace disease concerning for pneumonia, pulm edema hemorrhage. No evidence of PE in the main or lobar arteries but distal branches limited due to severe artifact. Echo pending. Repeat blood cultures. Continue IV vancomycin and Zosyn at this time.     Assessment / Plan:    Acute labs include:  Recent Labs     10/13/23  0604 10/13/23  1300 10/14/23  0200   WBC 9.6 8.4 9.7   HGB 12.7 13.2 12.2   HCT 37.6 38.4 35.8*   * 156 173       Recent Labs     10/13/23  0604 10/13/23  1300 10/14/23  0200    139 136   K 3.9 3.5 3.4*   * 109* 107   CO2 20* 20* 24   BUN 20 22* 26*   ALT 72 67 73         Signed: Nely Huerta PA-C

## 2023-10-14 NOTE — PROGRESS NOTES
IMPRESSION:   Acute hypoxic respiratory failure  Acute pulmonary edema rule out negative pressure pulmonary edema  Status post bladder tumor TURBT  Acute kidney injury  Rule out sepsis with cellulitis right lower extremity  Urinary tract infection with Staph aureus  Pt is at high risk of sudden decline and decompensation with life threatening consequenses and continued end organ dysfunction and failure  Pt is critically ill.  Time spent with pt and staff actively rendering care, managing pt and coordinating care as stated below; 30 minutes, exclusive of any procedures      RECOMMENDATIONS/PLAN:   ICU monitoring  44-year-old male came in with mild GI back pain redness of the lower extremities found to have a bladder tumor underwent TURBT now he is hypoxic in ICU on 100% noninvasive ventilator  BIPAP for non invasive ventilatory life support to avoid worsening respiratory acidosis, hypercacarbic or hypoxic respiratory arrest  CAT scan of the chest was done which shows fluid overload groundglass opacity possible negative pressure pulmonary edema  Continue with high-dose Lasix monitor BUN and creatinine  Arterial blood gas was done which shows PCO2 32 PO2 57 on 100% FiO2, now he is on 65% FiO2 PCO2 34 PO2 56 now is on high flow nasal cannula  We will get 2D echo  Worsening of the renal function change Lasix to 40 twice daily  Intubate and place on vent if NIV fails  Sleep study as an outpatient he needs CPAP machine  Agree with Empiric IV antibiotics pending culture results   Follow culture results  CVP monitoring  IV vasopressors for circulatory shock refractory to fluids to maintain SBP> 90  Transfuse prn to maintain Hgb > 7  Labs to follow electrolytes, renal function and and blood counts  Bronchial hygiene with respiratory therapy techniques, bronchodilators  Pt needs IV fluids with additives and Drug therapy requiring intensive monitoring for toxicity  Prescription drug management with home med reconciliation Hemoptysis COMPARISON: None. TECHNIQUE: Helical thin section chest CT following uneventful intravenous administration of nonionic contrast 100 mL of isovue 370 according to departmental PE protocol. Coronal and sagittal reformats were performed. 3D post processing was performed. CT dose reduction was achieved through the use of a standardized protocol tailored for this examination and automatic exposure control for dose modulation. FINDINGS: Evaluation of distal branches is limited by extensive motion artifact. No filling defects in the main or lobar arteries. THYROID: No nodule. MEDIASTINUM: No mass or lymphadenopathy. ALFREDO: No mass or lymphadenopathy. THORACIC AORTA: No aneurysm. HEART: Normal in size. ESOPHAGUS: No wall thickening or dilatation. TRACHEA/BRONCHI: Patent. PLEURA: No effusion or pneumothorax. LUNGS: Extensive patchy multilobar groundglass opacification. UPPER ABDOMEN: Partially imaged. No acute pathology. BONES: No aggressive bone lesion or fracture. 1. Extensive multilobar airspace disease. Differential includes pneumonia, pulmonary edema, alveolar hemorrhage. 2. Evaluation of distal pulmonary arterial branches is limited by severe motion artifact. No evidence of pulmonary embolism in the main or lobar arteries.       10/14 alert awake on high flow nasal cannula still severely hypoxic chest x-ray still shows bilateral congestive changes worsening of the renal function supplement potassium decrease Lasix dose, asking for pain medication

## 2023-10-15 ENCOUNTER — APPOINTMENT (OUTPATIENT)
Facility: HOSPITAL | Age: 48
DRG: 446 | End: 2023-10-15
Payer: MEDICAID

## 2023-10-15 LAB
ALBUMIN SERPL-MCNC: 2.2 G/DL (ref 3.5–5)
ALBUMIN/GLOB SERPL: 0.4 (ref 1.1–2.2)
ALP SERPL-CCNC: 113 U/L (ref 45–117)
ALT SERPL-CCNC: 69 U/L (ref 12–78)
ANION GAP SERPL CALC-SCNC: 6 MMOL/L (ref 5–15)
ARTERIAL PATENCY WRIST A: YES
AST SERPL W P-5'-P-CCNC: 53 U/L (ref 15–37)
BASE EXCESS BLDA CALC-SCNC: 1 MMOL/L (ref 0–3)
BASOPHILS # BLD: 0 K/UL (ref 0–0.1)
BASOPHILS NFR BLD: 0 % (ref 0–1)
BDY SITE: ABNORMAL
BILIRUB SERPL-MCNC: 1.6 MG/DL (ref 0.2–1)
BODY TEMPERATURE: 99
BUN SERPL-MCNC: 26 MG/DL (ref 6–20)
BUN/CREAT SERPL: 13 (ref 12–20)
CA-I BLD-MCNC: 8.7 MG/DL (ref 8.5–10.1)
CHLORIDE SERPL-SCNC: 104 MMOL/L (ref 97–108)
CO2 SERPL-SCNC: 25 MMOL/L (ref 21–32)
COHGB MFR BLD: 0.4 % (ref 1–2)
CREAT SERPL-MCNC: 2.04 MG/DL (ref 0.7–1.3)
DIFFERENTIAL METHOD BLD: ABNORMAL
ECHO AO ASC DIAM: 3 CM
ECHO AO ASCENDING AORTA INDEX: 1.15 CM/M2
ECHO AO ROOT DIAM: 2.8 CM
ECHO AO ROOT INDEX: 1.07 CM/M2
ECHO AV AREA PEAK VELOCITY: 3.2 CM2
ECHO AV AREA VTI: 3 CM2
ECHO AV AREA/BSA PEAK VELOCITY: 1.2 CM2/M2
ECHO AV AREA/BSA VTI: 1.1 CM2/M2
ECHO AV CUSP MM: 2.4 CM
ECHO AV MEAN GRADIENT: 3 MMHG
ECHO AV MEAN VELOCITY: 0.8 M/S
ECHO AV PEAK GRADIENT: 5 MMHG
ECHO AV PEAK VELOCITY: 1.2 M/S
ECHO AV VELOCITY RATIO: 0.83
ECHO AV VTI: 18.9 CM
ECHO BSA: 2.67 M2
ECHO EST RA PRESSURE: 5 MMHG
ECHO LA DIAMETER INDEX: 1.15 CM/M2
ECHO LA DIAMETER: 3 CM
ECHO LA TO AORTIC ROOT RATIO: 1.07
ECHO LV E' LATERAL VELOCITY: 12 CM/S
ECHO LV E' SEPTAL VELOCITY: 12 CM/S
ECHO LV EDV A2C: 123 ML
ECHO LV EDV A4C: 262 ML
ECHO LV EDV INDEX A4C: 100 ML/M2
ECHO LV EDV NDEX A2C: 47 ML/M2
ECHO LV EJECTION FRACTION A2C: 73 %
ECHO LV EJECTION FRACTION A4C: 69 %
ECHO LV EJECTION FRACTION BIPLANE: 69 % (ref 55–100)
ECHO LV ESV A2C: 33 ML
ECHO LV ESV A4C: 82 ML
ECHO LV ESV INDEX A2C: 13 ML/M2
ECHO LV ESV INDEX A4C: 31 ML/M2
ECHO LV FRACTIONAL SHORTENING: 39 % (ref 28–44)
ECHO LV INTERNAL DIMENSION DIASTOLE INDEX: 1.69 CM/M2
ECHO LV INTERNAL DIMENSION DIASTOLIC MMODE: 5.6 CM (ref 4.2–5.9)
ECHO LV INTERNAL DIMENSION DIASTOLIC: 4.4 CM (ref 4.2–5.9)
ECHO LV INTERNAL DIMENSION SYSTOLIC INDEX: 1.03 CM/M2
ECHO LV INTERNAL DIMENSION SYSTOLIC MMODE: 3.6 CM
ECHO LV INTERNAL DIMENSION SYSTOLIC: 2.7 CM
ECHO LV IVSD MMODE: 0.9 CM (ref 0.6–1)
ECHO LV IVSD: 1 CM (ref 0.6–1)
ECHO LV MASS 2D: 168.9 G (ref 88–224)
ECHO LV MASS INDEX 2D: 64.7 G/M2 (ref 49–115)
ECHO LV POSTERIOR WALL DIASTOLIC MMODE: 1.1 CM (ref 0.6–1)
ECHO LV POSTERIOR WALL DIASTOLIC: 1.2 CM (ref 0.6–1)
ECHO LV RELATIVE WALL THICKNESS RATIO: 0.55
ECHO LVOT AREA: 3.8 CM2
ECHO LVOT AV VTI INDEX: 0.79
ECHO LVOT DIAM: 2.2 CM
ECHO LVOT MEAN GRADIENT: 2 MMHG
ECHO LVOT PEAK GRADIENT: 4 MMHG
ECHO LVOT PEAK VELOCITY: 1 M/S
ECHO LVOT STROKE VOLUME INDEX: 21.7 ML/M2
ECHO LVOT SV: 56.6 ML
ECHO LVOT VTI: 14.9 CM
ECHO MV A VELOCITY: 0.71 M/S
ECHO MV E DECELERATION TIME (DT): 206 MS
ECHO MV E VELOCITY: 0.81 M/S
ECHO MV E/A RATIO: 1.14
ECHO MV E/E' LATERAL: 6.75
ECHO MV E/E' RATIO (AVERAGED): 6.75
ECHO MV E/E' SEPTAL: 6.75
ECHO PV MAX VELOCITY: 1.4 M/S
ECHO PV MEAN GRADIENT: 4 MMHG
ECHO PV MEAN VELOCITY: 0.9 M/S
ECHO PV PEAK GRADIENT: 8 MMHG
ECHO PV VTI: 22.5 CM
ECHO RA AREA 4C: 10.9 CM2
ECHO RA END SYSTOLIC VOLUME APICAL 4 CHAMBER INDEX BSA: 9 ML/M2
ECHO RA VOLUME: 23 ML
ECHO RIGHT VENTRICULAR SYSTOLIC PRESSURE (RVSP): 23 MMHG
ECHO RV BASAL DIMENSION: 4.3 CM
ECHO RV LONGITUDINAL DIMENSION: 9.6 CM
ECHO RV MID DIMENSION: 3.1 CM
ECHO RV TAPSE: 2.8 CM (ref 1.7–?)
ECHO RVOT MEAN GRADIENT: 1 MMHG
ECHO RVOT PEAK GRADIENT: 3 MMHG
ECHO RVOT PEAK VELOCITY: 0.8 M/S
ECHO RVOT VTI: 13.2 CM
ECHO TV REGURGITANT MAX VELOCITY: 2.1 M/S
ECHO TV REGURGITANT PEAK GRADIENT: 18 MMHG
EOSINOPHIL # BLD: 0.1 K/UL (ref 0–0.4)
EOSINOPHIL NFR BLD: 1 % (ref 0–7)
ERYTHROCYTE [DISTWIDTH] IN BLOOD BY AUTOMATED COUNT: 14.2 % (ref 11.5–14.5)
FIO2 ON VENT: 70 %
GLOBULIN SER CALC-MCNC: 5.1 G/DL (ref 2–4)
GLUCOSE SERPL-MCNC: 111 MG/DL (ref 65–100)
HCO3 BLDA-SCNC: 25 MMOL/L (ref 22–26)
HCT VFR BLD AUTO: 31.2 % (ref 36.6–50.3)
HGB BLD-MCNC: 10.7 G/DL (ref 12.1–17)
IMM GRANULOCYTES # BLD AUTO: 0 K/UL
IMM GRANULOCYTES NFR BLD AUTO: 0 %
LYMPHOCYTES # BLD: 1.7 K/UL (ref 0.8–3.5)
LYMPHOCYTES NFR BLD: 19 % (ref 12–49)
MCH RBC QN AUTO: 30.3 PG (ref 26–34)
MCHC RBC AUTO-ENTMCNC: 34.3 G/DL (ref 30–36.5)
MCV RBC AUTO: 88.4 FL (ref 80–99)
METHGB MFR BLD: 0.3 % (ref 0–1.4)
MONOCYTES # BLD: 0.5 K/UL (ref 0–1)
MONOCYTES NFR BLD: 6 % (ref 5–13)
MYELOCYTES NFR BLD MANUAL: 2 %
NEUTS SEG # BLD: 6.6 K/UL (ref 1.8–8)
NEUTS SEG NFR BLD: 72 % (ref 32–75)
NRBC # BLD: 0 K/UL (ref 0–0.01)
NRBC BLD-RTO: 0 PER 100 WBC
OXYHGB MFR BLD: 88.7 % (ref 95–99)
PCO2 BLDA: 35 MMHG (ref 35–45)
PERFORMED BY:: ABNORMAL
PH BLDA: 7.46 (ref 7.35–7.45)
PLATELET # BLD AUTO: 201 K/UL (ref 150–400)
PMV BLD AUTO: 11 FL (ref 8.9–12.9)
PO2 BLDA: 52 MMHG (ref 80–100)
POTASSIUM SERPL-SCNC: 4 MMOL/L (ref 3.5–5.1)
PROT SERPL-MCNC: 7.3 G/DL (ref 6.4–8.2)
RBC # BLD AUTO: 3.53 M/UL (ref 4.1–5.7)
RBC MORPH BLD: ABNORMAL
RBC MORPH BLD: ABNORMAL
SAO2 % BLD: 89 % (ref 95–99)
SAO2% DEVICE SAO2% SENSOR NAME: ABNORMAL
SODIUM SERPL-SCNC: 135 MMOL/L (ref 136–145)
SPECIMEN SITE: ABNORMAL
VANCOMYCIN SERPL-MCNC: 9.2 UG/ML
WBC # BLD AUTO: 9.1 K/UL (ref 4.1–11.1)

## 2023-10-15 PROCEDURE — 2000000000 HC ICU R&B

## 2023-10-15 PROCEDURE — 6370000000 HC RX 637 (ALT 250 FOR IP): Performed by: EMERGENCY MEDICINE

## 2023-10-15 PROCEDURE — 94761 N-INVAS EAR/PLS OXIMETRY MLT: CPT

## 2023-10-15 PROCEDURE — 80202 ASSAY OF VANCOMYCIN: CPT

## 2023-10-15 PROCEDURE — 85025 COMPLETE CBC W/AUTO DIFF WBC: CPT

## 2023-10-15 PROCEDURE — 36600 WITHDRAWAL OF ARTERIAL BLOOD: CPT

## 2023-10-15 PROCEDURE — 2700000000 HC OXYGEN THERAPY PER DAY

## 2023-10-15 PROCEDURE — 6360000002 HC RX W HCPCS: Performed by: PHYSICIAN ASSISTANT

## 2023-10-15 PROCEDURE — 82803 BLOOD GASES ANY COMBINATION: CPT

## 2023-10-15 PROCEDURE — 6370000000 HC RX 637 (ALT 250 FOR IP): Performed by: UROLOGY

## 2023-10-15 PROCEDURE — 80053 COMPREHEN METABOLIC PANEL: CPT

## 2023-10-15 PROCEDURE — 2580000003 HC RX 258: Performed by: STUDENT IN AN ORGANIZED HEALTH CARE EDUCATION/TRAINING PROGRAM

## 2023-10-15 PROCEDURE — 6370000000 HC RX 637 (ALT 250 FOR IP): Performed by: INTERNAL MEDICINE

## 2023-10-15 PROCEDURE — 6360000002 HC RX W HCPCS: Performed by: STUDENT IN AN ORGANIZED HEALTH CARE EDUCATION/TRAINING PROGRAM

## 2023-10-15 PROCEDURE — 94660 CPAP INITIATION&MGMT: CPT

## 2023-10-15 PROCEDURE — 71045 X-RAY EXAM CHEST 1 VIEW: CPT

## 2023-10-15 RX ORDER — DIPHENHYDRAMINE HCL 25 MG
25 CAPSULE ORAL EVERY 6 HOURS PRN
Status: DISCONTINUED | OUTPATIENT
Start: 2023-10-15 | End: 2023-10-20 | Stop reason: HOSPADM

## 2023-10-15 RX ADMIN — SODIUM CHLORIDE, PRESERVATIVE FREE 10 ML: 5 INJECTION INTRAVENOUS at 09:10

## 2023-10-15 RX ADMIN — SODIUM CHLORIDE, PRESERVATIVE FREE 10 ML: 5 INJECTION INTRAVENOUS at 22:02

## 2023-10-15 RX ADMIN — PIPERACILLIN AND TAZOBACTAM 3375 MG: 3; .375 INJECTION, POWDER, LYOPHILIZED, FOR SOLUTION INTRAVENOUS at 20:55

## 2023-10-15 RX ADMIN — HYDROCORTISONE: 0.01 CREAM TOPICAL at 09:00

## 2023-10-15 RX ADMIN — POTASSIUM CHLORIDE 40 MEQ: 1500 TABLET, EXTENDED RELEASE ORAL at 09:08

## 2023-10-15 RX ADMIN — LORAZEPAM 0.5 MG: 2 INJECTION INTRAMUSCULAR; INTRAVENOUS at 20:55

## 2023-10-15 RX ADMIN — OXYCODONE AND ACETAMINOPHEN 1 TABLET: 5; 325 TABLET ORAL at 20:49

## 2023-10-15 RX ADMIN — TAMSULOSIN HYDROCHLORIDE 0.4 MG: 0.4 CAPSULE ORAL at 09:08

## 2023-10-15 RX ADMIN — Medication 95 MG: at 09:08

## 2023-10-15 RX ADMIN — HYDROCORTISONE: 0.01 CREAM TOPICAL at 20:54

## 2023-10-15 RX ADMIN — FUROSEMIDE 40 MG: 10 INJECTION, SOLUTION INTRAMUSCULAR; INTRAVENOUS at 17:24

## 2023-10-15 RX ADMIN — Medication 95 MG: at 17:24

## 2023-10-15 RX ADMIN — LORAZEPAM 0.5 MG: 2 INJECTION INTRAMUSCULAR; INTRAVENOUS at 09:24

## 2023-10-15 RX ADMIN — FUROSEMIDE 40 MG: 10 INJECTION, SOLUTION INTRAMUSCULAR; INTRAVENOUS at 09:42

## 2023-10-15 RX ADMIN — PIPERACILLIN AND TAZOBACTAM 3375 MG: 3; .375 INJECTION, POWDER, LYOPHILIZED, FOR SOLUTION INTRAVENOUS at 15:55

## 2023-10-15 RX ADMIN — PIPERACILLIN AND TAZOBACTAM 3375 MG: 3; .375 INJECTION, POWDER, LYOPHILIZED, FOR SOLUTION INTRAVENOUS at 05:46

## 2023-10-15 RX ADMIN — VANCOMYCIN HYDROCHLORIDE 1500 MG: 750 INJECTION, POWDER, LYOPHILIZED, FOR SOLUTION INTRAVENOUS at 12:38

## 2023-10-15 RX ADMIN — Medication 95 MG: at 12:39

## 2023-10-15 ASSESSMENT — ENCOUNTER SYMPTOMS
VOMITING: 0
COUGH: 1
DIARRHEA: 0
ABDOMINAL PAIN: 0
SHORTNESS OF BREATH: 1
NAUSEA: 0

## 2023-10-15 ASSESSMENT — PAIN SCALES - GENERAL
PAINLEVEL_OUTOF10: 0
PAINLEVEL_OUTOF10: 5
PAINLEVEL_OUTOF10: 0
PAINLEVEL_OUTOF10: 0

## 2023-10-15 NOTE — PROGRESS NOTES
1145: BC protocol reviewed with charge nurse. Physician notified of protocol. Confirmed with provider, Edilberto Cano, that she still wants blood culture completed for patient at this time. Jm nurse made aware. 1824: Pt complains of burning/itching in upper thighs shortly after Zosyn started. Zosyn stopped at this time. MD notified.

## 2023-10-15 NOTE — PROCEDURES
Vascular Access Team Consult Note: received consult for PIV assessment and 2nd site PIV placement from primary care nurse,     Ultrasound-guided (USG) PIV placement: see Epic Avatar and EHR flowsheet date for additional vascular access device and procedural information. 20 g 1.75 inch PIV placed with ultrasound-guidance x 1 attempt in right anterior mid-forearm cephalic vein. Brisk blood return noted, flushed easily with 10 mL NS. Dressed with sterile skin barrier prep wipe and 3M CHG wafer transparent Tegaderm dressing. Needle-free connector and alcohol swab end caps utilized. Client tolerated well, no patient complaints. Client continues to benefit from ultrasound-guided PIV placement due to limited suitable veins for USG cannulation, vein depth, and small vein diameters. Primary care nurse, Gold Cohen, notified. Please re-enter IP PICC Team Consult in [facility] for any further vascular access needs.      Didi Alvarado RN

## 2023-10-15 NOTE — PROGRESS NOTES
Hospitalist Progress Note    NAME:   Vanessa Barba   : 1975   MRN: [id number]     Date/Time: 10/15/2023 8:53 AM  Patient PCP: No primary care provider on file. Estimated discharge date: 48 hours  Barriers: Improvement in respiratory status, IV diuresis, repeat blood cultures. Hospital course:   Vanessa Barba is a 50 y.o.  male with no significant past medical history besides umbilical hernia who presented to the emergency department with multiple complaints being lower back pain, chills, myalgias, right lower leg redness and pain, abdominal pain in the area of umbilical hernia. Initial ED work-up concerning with leukocytosis of 18,000. Patient meeting SIRS criteria with tachycardia and leukocytosis. Blood cultures 10/10 obtained and with negative growth thus far. Unclear source but suspected related to right ankle cellulitis. Venous duplex ultrasound negative for DVT but noted right lower extremity lymphadenopathy. CT abdomen pelvis with small fat-containing umbilical hernia with mild inflammation, urinary bladder mass versus protrusion of enlarged prostate. General surgery consulted, Dr. Katz whom patient scheduled for surgery later this month, can continue outpatient surgery, no intervention indicated at this time. CRP and procalcitonin elevated, being treated with IV Vanc and Zosyn. Urology consulted for possible bladder mass. Patient underwent cystoscopy with TURBT 10/12, highly suspicious for cancer, pending pathology. RRT 10/13 for hypoxia. Patient required BiPAP, transferred to the ICU for additional management. ABG with hypoxia and hypercarbia. Chest x-ray found with significant pulmonary edema, IV diuresis started. Pulmonology consulted. CTA with extensive multilobar airspace disease concerning for pneumonia, pulm edema, alveolar hemorrhage. No evidence of PE in the main or lobar arteries but distal branches limited due to severe artifact.   Echo with normal EF of based      Comments   >50% of visit spent in counseling and coordination of care     ________________________________________________________________________  Jammie Shane PA-C     Procedures: see electronic medical records for all procedures/Xrays and details which were not copied into this note but were reviewed prior to creation of Plan. LABS:  I reviewed today's most current labs and imaging studies.   Pertinent labs include:  Recent Labs     10/13/23  1300 10/14/23  0200 10/15/23  0341   WBC 8.4 9.7 9.1   HGB 13.2 12.2 10.7*   HCT 38.4 35.8* 31.2*    173 201       Recent Labs     10/13/23  1300 10/14/23  0200 10/15/23  0341    136 135*   K 3.5 3.4* 4.0   * 107 104   CO2 20* 24 25   BUN 22* 26* 26*   ALT 67 73 69         Signed: Jammie Shane PA-C

## 2023-10-16 LAB
ALBUMIN SERPL-MCNC: 2.3 G/DL (ref 3.5–5)
ALBUMIN/GLOB SERPL: 0.4 (ref 1.1–2.2)
ALP SERPL-CCNC: 148 U/L (ref 45–117)
ALT SERPL-CCNC: 76 U/L (ref 12–78)
ANION GAP SERPL CALC-SCNC: 6 MMOL/L (ref 5–15)
AST SERPL W P-5'-P-CCNC: 36 U/L (ref 15–37)
BACTERIA SPEC CULT: NORMAL
BACTERIA SPEC CULT: NORMAL
BASOPHILS # BLD: 0 K/UL (ref 0–0.1)
BASOPHILS NFR BLD: 0 % (ref 0–1)
BILIRUB SERPL-MCNC: 1.5 MG/DL (ref 0.2–1)
BUN SERPL-MCNC: 27 MG/DL (ref 6–20)
BUN/CREAT SERPL: 14 (ref 12–20)
CA-I BLD-MCNC: 9.2 MG/DL (ref 8.5–10.1)
CHLORIDE SERPL-SCNC: 102 MMOL/L (ref 97–108)
CO2 SERPL-SCNC: 27 MMOL/L (ref 21–32)
CREAT SERPL-MCNC: 1.94 MG/DL (ref 0.7–1.3)
CRP SERPL-MCNC: 32 MG/DL (ref 0–0.6)
DIFFERENTIAL METHOD BLD: ABNORMAL
EOSINOPHIL # BLD: 0.1 K/UL (ref 0–0.4)
EOSINOPHIL NFR BLD: 1 % (ref 0–7)
ERYTHROCYTE [DISTWIDTH] IN BLOOD BY AUTOMATED COUNT: 14.4 % (ref 11.5–14.5)
GLOBULIN SER CALC-MCNC: 5.3 G/DL (ref 2–4)
GLUCOSE SERPL-MCNC: 121 MG/DL (ref 65–100)
HCT VFR BLD AUTO: 31.3 % (ref 36.6–50.3)
HGB BLD-MCNC: 10.6 G/DL (ref 12.1–17)
IMM GRANULOCYTES # BLD AUTO: 0 K/UL
IMM GRANULOCYTES NFR BLD AUTO: 0 %
LYMPHOCYTES # BLD: 1.1 K/UL (ref 0.8–3.5)
LYMPHOCYTES NFR BLD: 13 % (ref 12–49)
Lab: NORMAL
Lab: NORMAL
MAGNESIUM SERPL-MCNC: 2.2 MG/DL (ref 1.6–2.4)
MCH RBC QN AUTO: 30.2 PG (ref 26–34)
MCHC RBC AUTO-ENTMCNC: 33.9 G/DL (ref 30–36.5)
MCV RBC AUTO: 89.2 FL (ref 80–99)
METAMYELOCYTES NFR BLD MANUAL: 3 %
MONOCYTES # BLD: 0.7 K/UL (ref 0–1)
MONOCYTES NFR BLD: 8 % (ref 5–13)
NEUTS BAND NFR BLD MANUAL: 4 % (ref 0–6)
NEUTS SEG # BLD: 6.6 K/UL (ref 1.8–8)
NEUTS SEG NFR BLD: 71 % (ref 32–75)
NRBC # BLD: 0 K/UL (ref 0–0.01)
NRBC BLD-RTO: 0 PER 100 WBC
PHOSPHATE SERPL-MCNC: 2.9 MG/DL (ref 2.6–4.7)
PLATELET # BLD AUTO: 205 K/UL (ref 150–400)
PMV BLD AUTO: 10 FL (ref 8.9–12.9)
POTASSIUM SERPL-SCNC: 3 MMOL/L (ref 3.5–5.1)
PROCALCITONIN SERPL-MCNC: 0.92 NG/ML
PROT SERPL-MCNC: 7.6 G/DL (ref 6.4–8.2)
RBC # BLD AUTO: 3.51 M/UL (ref 4.1–5.7)
RBC MORPH BLD: ABNORMAL
SODIUM SERPL-SCNC: 135 MMOL/L (ref 136–145)
VANCOMYCIN SERPL-MCNC: 10.6 UG/ML
WBC # BLD AUTO: 8.8 K/UL (ref 4.1–11.1)

## 2023-10-16 PROCEDURE — 86140 C-REACTIVE PROTEIN: CPT

## 2023-10-16 PROCEDURE — 99223 1ST HOSP IP/OBS HIGH 75: CPT | Performed by: INTERNAL MEDICINE

## 2023-10-16 PROCEDURE — 2580000003 HC RX 258: Performed by: STUDENT IN AN ORGANIZED HEALTH CARE EDUCATION/TRAINING PROGRAM

## 2023-10-16 PROCEDURE — 94761 N-INVAS EAR/PLS OXIMETRY MLT: CPT

## 2023-10-16 PROCEDURE — 6360000002 HC RX W HCPCS: Performed by: STUDENT IN AN ORGANIZED HEALTH CARE EDUCATION/TRAINING PROGRAM

## 2023-10-16 PROCEDURE — 6370000000 HC RX 637 (ALT 250 FOR IP): Performed by: EMERGENCY MEDICINE

## 2023-10-16 PROCEDURE — 6370000000 HC RX 637 (ALT 250 FOR IP): Performed by: UROLOGY

## 2023-10-16 PROCEDURE — 94640 AIRWAY INHALATION TREATMENT: CPT

## 2023-10-16 PROCEDURE — 85025 COMPLETE CBC W/AUTO DIFF WBC: CPT

## 2023-10-16 PROCEDURE — 2000000000 HC ICU R&B

## 2023-10-16 PROCEDURE — 83735 ASSAY OF MAGNESIUM: CPT

## 2023-10-16 PROCEDURE — 2700000000 HC OXYGEN THERAPY PER DAY

## 2023-10-16 PROCEDURE — 84145 PROCALCITONIN (PCT): CPT

## 2023-10-16 PROCEDURE — 80053 COMPREHEN METABOLIC PANEL: CPT

## 2023-10-16 PROCEDURE — 6370000000 HC RX 637 (ALT 250 FOR IP): Performed by: INTERNAL MEDICINE

## 2023-10-16 PROCEDURE — 36415 COLL VENOUS BLD VENIPUNCTURE: CPT

## 2023-10-16 PROCEDURE — 80202 ASSAY OF VANCOMYCIN: CPT

## 2023-10-16 PROCEDURE — 84100 ASSAY OF PHOSPHORUS: CPT

## 2023-10-16 PROCEDURE — 2100000000 HC CCU R&B

## 2023-10-16 PROCEDURE — 6360000002 HC RX W HCPCS: Performed by: PHYSICIAN ASSISTANT

## 2023-10-16 RX ORDER — IPRATROPIUM BROMIDE AND ALBUTEROL SULFATE 2.5; .5 MG/3ML; MG/3ML
1 SOLUTION RESPIRATORY (INHALATION) EVERY 4 HOURS PRN
Status: DISCONTINUED | OUTPATIENT
Start: 2023-10-16 | End: 2023-10-20 | Stop reason: HOSPADM

## 2023-10-16 RX ADMIN — TAMSULOSIN HYDROCHLORIDE 0.4 MG: 0.4 CAPSULE ORAL at 08:47

## 2023-10-16 RX ADMIN — PIPERACILLIN AND TAZOBACTAM 3375 MG: 3; .375 INJECTION, POWDER, LYOPHILIZED, FOR SOLUTION INTRAVENOUS at 16:21

## 2023-10-16 RX ADMIN — SODIUM CHLORIDE, PRESERVATIVE FREE 10 ML: 5 INJECTION INTRAVENOUS at 09:00

## 2023-10-16 RX ADMIN — Medication 95 MG: at 17:44

## 2023-10-16 RX ADMIN — POTASSIUM CHLORIDE 40 MEQ: 1500 TABLET, EXTENDED RELEASE ORAL at 08:47

## 2023-10-16 RX ADMIN — OXYCODONE AND ACETAMINOPHEN 1 TABLET: 5; 325 TABLET ORAL at 23:50

## 2023-10-16 RX ADMIN — FUROSEMIDE 40 MG: 10 INJECTION, SOLUTION INTRAMUSCULAR; INTRAVENOUS at 08:46

## 2023-10-16 RX ADMIN — HYDROCORTISONE: 0.01 CREAM TOPICAL at 20:17

## 2023-10-16 RX ADMIN — VANCOMYCIN HYDROCHLORIDE 2000 MG: 1 INJECTION, POWDER, LYOPHILIZED, FOR SOLUTION INTRAVENOUS at 12:47

## 2023-10-16 RX ADMIN — HYDROCORTISONE: 0.01 CREAM TOPICAL at 08:48

## 2023-10-16 RX ADMIN — LORAZEPAM 0.5 MG: 2 INJECTION INTRAMUSCULAR; INTRAVENOUS at 17:50

## 2023-10-16 RX ADMIN — POTASSIUM & SODIUM PHOSPHATES POWDER PACK 280-160-250 MG 500 MG: 280-160-250 PACK at 20:15

## 2023-10-16 RX ADMIN — POTASSIUM & SODIUM PHOSPHATES POWDER PACK 280-160-250 MG 500 MG: 280-160-250 PACK at 16:12

## 2023-10-16 RX ADMIN — POTASSIUM & SODIUM PHOSPHATES POWDER PACK 280-160-250 MG 500 MG: 280-160-250 PACK at 09:00

## 2023-10-16 RX ADMIN — PIPERACILLIN AND TAZOBACTAM 3375 MG: 3; .375 INJECTION, POWDER, LYOPHILIZED, FOR SOLUTION INTRAVENOUS at 07:03

## 2023-10-16 RX ADMIN — IPRATROPIUM BROMIDE AND ALBUTEROL SULFATE 1 DOSE: 2.5; .5 SOLUTION RESPIRATORY (INHALATION) at 07:54

## 2023-10-16 RX ADMIN — FUROSEMIDE 40 MG: 10 INJECTION, SOLUTION INTRAMUSCULAR; INTRAVENOUS at 17:44

## 2023-10-16 RX ADMIN — ONDANSETRON 4 MG: 2 INJECTION INTRAMUSCULAR; INTRAVENOUS at 03:21

## 2023-10-16 RX ADMIN — Medication 95 MG: at 08:46

## 2023-10-16 RX ADMIN — SODIUM CHLORIDE, PRESERVATIVE FREE 10 ML: 5 INJECTION INTRAVENOUS at 20:17

## 2023-10-16 ASSESSMENT — PAIN DESCRIPTION - ORIENTATION: ORIENTATION: MID

## 2023-10-16 ASSESSMENT — PAIN SCALES - GENERAL
PAINLEVEL_OUTOF10: 0
PAINLEVEL_OUTOF10: 0
PAINLEVEL_OUTOF10: 7
PAINLEVEL_OUTOF10: 0

## 2023-10-16 ASSESSMENT — PAIN SCALES - WONG BAKER: WONGBAKER_NUMERICALRESPONSE: 4

## 2023-10-16 ASSESSMENT — PAIN DESCRIPTION - LOCATION: LOCATION: SCROTUM

## 2023-10-16 ASSESSMENT — PAIN DESCRIPTION - DESCRIPTORS: DESCRIPTORS: ACHING

## 2023-10-16 ASSESSMENT — PAIN - FUNCTIONAL ASSESSMENT: PAIN_FUNCTIONAL_ASSESSMENT: ACTIVITIES ARE NOT PREVENTED

## 2023-10-16 NOTE — PROGRESS NOTES
Spiritual Care Assessment/Progress Note  [facility]    Name: Floyd Kirk MRN: [id number]    Age: 50 y.o. Sex: male   Language: English     Date: 10/16/2023            Total Time Calculated: 7 min              Spiritual Assessment begun in [facility] 2 CCU  Service Provided For[de-identified] Patient  Referral/Consult From[de-identified] Rounding  Encounter Overview/Reason : Initial Encounter    Spiritual beliefs:      [] Involved in a tracy tradition/spiritual practice:      [] Supported by a tracy community:      [] Claims no spiritual orientation:      [] Seeking spiritual identity:           [] Adheres to an individual form of spirituality:      [x] Not able to assess:                Identified resources for coping and support system:   Support System: Unknown       [] Prayer                  [] Devotional reading               [] Music                  [] Guided Imagery     [] Pet visits                                        [] Other: (COMMENT)     Specific area/focus of visit   Encounter:    Crisis:    Spiritual/Emotional needs: Type: Spiritual Support  Ritual, Rites and Sacraments:    Grief, Loss, and Adjustments:    Ethics/Mediation:    Behavioral Health:    Palliative Care: Advance Care Planning:      Plan/Referrals: Other (Comment) ( is available if needed)    Narrative: Rounding visit in 2 CCU. Patient was present during the visit. Patient wanted to take a rest.  respected the patient's need. Advised of  availability. Please, contact the spiritual care team if there are any needs. Rev. Pretty, Mine Crowell.   Chaplain Resident  Page a : [phone]

## 2023-10-16 NOTE — PROGRESS NOTES
UROLOGY Progress Note         [phone]      Daily Progress Note: 10/16/2023    Subjective: The patient is seen for UROLOGIC follow up. He is s/p TURBT (incomplete resection) on 10/12/23. On 10/13/2023 patient developed gross hematuria, had manual clot evacuation with walker cath change. Additionally,    patient became hypoxic and was transferred to ICU on 10/13/2023 CT of the chest was done and was negative for PE, but positive for pulmonary edema  Today,patient seen at the bedside alert and oriented on high flow oxygen. Reports discomfort associated with walker catheter, but overall feels better. Walker catheter draining orange colored urine in the tubing.   Problem List:  Patient Active Problem List   Diagnosis    SIRS (systemic inflammatory response syndrome) (HCC)    Malignant neoplasm of overlapping sites of bladder (HCC)    Hematuria    Leukocytosis         Medications reviewed  Current Facility-Administered Medications   Medication Dose Route Frequency    ipratropium 0.5 mg-albuterol 2.5 mg (DUONEB) nebulizer solution 1 Dose  1 Dose Inhalation Q4H PRN    potassium & sodium phosphates (PHOS-NAK) 280-160-250 MG packet 500 mg  2 packet Oral Q6H RT    Vanc Level on 10/16 with AM Labs   Other Once    diphenhydrAMINE (BENADRYL) capsule 25 mg  25 mg Oral Q6H PRN    potassium chloride (KLOR-CON M) extended release tablet 40 mEq  40 mEq Oral Daily    furosemide (LASIX) injection 40 mg  40 mg IntraVENous BID    melatonin tablet 5 mg  5 mg Oral Nightly PRN    LORazepam (ATIVAN) injection 0.5 mg  0.5 mg IntraVENous Q6H PRN    hydrocortisone 1 % cream   Topical BID    phenazopyridine (PYRIDIUM) tablet 95 mg  95 mg Oral TID WC    oxyCODONE-acetaminophen (PERCOCET) 5-325 MG per tablet 1 tablet  1 tablet Oral Q6H PRN    diatrizoate meglumine-sodium (GASTROGRAFIN) 66-10 % solution 30 mL  30 mL Oral ONCE PRN    tamsulosin (FLOMAX) capsule 0.4 mg  0.4 mg Oral Daily    sodium chloride flush 0.9 % injection 5-40 mL 5-40 mL IntraVENous 2 times per day    sodium chloride flush 0.9 % injection 5-40 mL  5-40 mL IntraVENous PRN    0.9 % sodium chloride infusion   IntraVENous PRN    ondansetron (ZOFRAN-ODT) disintegrating tablet 4 mg  4 mg Oral Q8H PRN    Or    ondansetron (ZOFRAN) injection 4 mg  4 mg IntraVENous Q6H PRN    polyethylene glycol (GLYCOLAX) packet 17 g  17 g Oral Daily PRN    acetaminophen (TYLENOL) tablet 650 mg  650 mg Oral Q6H PRN    Or    acetaminophen (TYLENOL) suppository 650 mg  650 mg Rectal Q6H PRN    piperacillin-tazobactam (ZOSYN) 3,375 mg in sodium chloride 0.9 % 50 mL IVPB (mini-bag)  3,375 mg IntraVENous Q8H    vancomycin (VANCOCIN) intermittent dosing (placeholder)  1 each Other RX Placeholder       Review of Systems:   Review of Systems   Genitourinary: Albrecht cath discomfort   All other systems reviewed and are negative. Objective:   Physical Exam  Cardiovascular:      Rate and Rhythm: Tachycardia present. Pulmonary:      Comments: High flow oxygen  Abdominal:      Palpations: Abdomen is soft. Genitourinary:     Comments: Albrecht cath with orange colored urine, patient on pyridium. Neurological:      General: No focal deficit present. Mental Status: He is alert.           Vitals:    10/16/23 0748   BP:    Pulse:    Resp:    Temp:    SpO2: 98%         Data Review:       Recent Days:  Recent Labs     10/14/23  0200 10/15/23  0341 10/16/23  0356   WBC 9.7 9.1 8.8   HGB 12.2 10.7* 10.6*   HCT 35.8* 31.2* 31.3*    201 205     Recent Labs     10/14/23  0200 10/15/23  0341 10/16/23  0356    135* 135*   K 3.4* 4.0 3.0*    104 102   CO2 24 25 27   BUN 26* 26* 27*   MG  --   --  2.2   PHOS  --   --  2.9   ALT 73 69 76       24 Hour Results:  Recent Results (from the past 24 hour(s))   CBC with Auto Differential    Collection Time: 10/16/23  3:56 AM   Result Value Ref Range    WBC 8.8 4.1 - 11.1 K/uL    RBC 3.51 (L) 4.10 - 5.70 M/uL    Hemoglobin 10.6 (L) 12.1 - 17.0 g/dL    Hematocrit 31.3 (L) 36.6 - 50.3 %    MCV 89.2 80.0 - 99.0 FL    MCH 30.2 26.0 - 34.0 PG    MCHC 33.9 30.0 - 36.5 g/dL    RDW 14.4 11.5 - 14.5 %    Platelets 655 675 - 462 K/uL    MPV 10.0 8.9 - 12.9 FL    Nucleated RBCs 0.0 0.0  WBC    nRBC 0.00 0.00 - 0.01 K/uL    Neutrophils % 71 32 - 75 %    Band Neutrophils 4 0 - 6 %    Lymphocytes % 13 12 - 49 %    Monocytes % 8 5 - 13 %    Eosinophils % 1 0 - 7 %    Basophils % 0 0 - 1 %    Metamyelocytes 3 (H) 0 %    Immature Granulocytes 0 %    Neutrophils Absolute 6.6 1.8 - 8.0 K/UL    Lymphocytes Absolute 1.1 0.8 - 3.5 K/UL    Monocytes Absolute 0.7 0.0 - 1.0 K/UL    Eosinophils Absolute 0.1 0.0 - 0.4 K/UL    Basophils Absolute 0.0 0.0 - 0.1 K/UL    Absolute Immature Granulocyte 0.0 K/UL    Differential Type Manual      RBC Comment Normocytic, Normochromic     Comprehensive Metabolic Panel w/ Reflex to MG    Collection Time: 10/16/23  3:56 AM   Result Value Ref Range    Sodium 135 (L) 136 - 145 mmol/L    Potassium 3.0 (L) 3.5 - 5.1 mmol/L    Chloride 102 97 - 108 mmol/L    CO2 27 21 - 32 mmol/L    Anion Gap 6 5 - 15 mmol/L    Glucose 121 (H) 65 - 100 mg/dL    BUN 27 (H) 6 - 20 mg/dL    Creatinine 1.94 (H) 0.70 - 1.30 mg/dL    Bun/Cre Ratio 14 12 - 20      Est, Glom Filt Rate 42 (L) >60 ml/min/1.73m2    Calcium 9.2 8.5 - 10.1 mg/dL    Total Bilirubin 1.5 (H) 0.2 - 1.0 mg/dL    AST 36 15 - 37 U/L    ALT 76 12 - 78 U/L    Alk Phosphatase 148 (H) 45 - 117 U/L    Total Protein 7.6 6.4 - 8.2 g/dL    Albumin 2.3 (L) 3.5 - 5.0 g/dL    Globulin 5.3 (H) 2.0 - 4.0 g/dL    Albumin/Globulin Ratio 0.4 (L) 1.1 - 2.2     C-Reactive Protein    Collection Time: 10/16/23  3:56 AM   Result Value Ref Range    CRP 32.00 (H) 0.00 - 0.60 mg/dL   Procalcitonin    Collection Time: 10/16/23  3:56 AM   Result Value Ref Range    Procalcitonin 0.92 (H) 0 ng/mL   Vancomycin Level, Random    Collection Time: 10/16/23  3:56 AM   Result Value Ref Range    Vancomycin Rm 10.6 ug/mL   Magnesium

## 2023-10-16 NOTE — CONSULTS
Comprehensive Nutrition Assessment    Type and Reason for Visit:  Initial, Consult (pt requested)    Nutrition Recommendations/Plan:   Continue regular diet  Prosource gelatein 2x/d (180 kcals, 40g pro)  Pt interested in wt loss edu -- RD to provide business card for OP appt prior to d/c, info also left in d/c instructions. Provide PRN bowel regimen, no BM x4d per EMR      Malnutrition Assessment:  Malnutrition Status:  Mild malnutrition (10/16/23 1741)    Context:  Acute Illness     Findings of the 6 clinical characteristics of malnutrition:  Energy Intake:  50% or less of estimated energy requirements for 5 or more days (poor PO x3d IP)  Weight Loss:  No significant weight loss     Body Fat Loss:  Unable to assess     Muscle Mass Loss:  Unable to assess    Fluid Accumulation:  No significant fluid accumulation     Strength:  Not Performed    Nutrition Assessment:    Admitted for SIRS. (10/12) Cyso w/ TURBT, suspected bladder cancer.  (10/13) RRT, +pna requiring oxygen supplementation. (10/16) pt requested RD consult. At interview, pt reports no nutrition issues or unintentional wt loss pta. Reports \"picking\" at meals x1 week, noted order in for \"2 ensures wth every meal\". Per EMR documentation, intakes >75% of meals prior to RRT on 10/13. RD obtained food preferences, pt reports not tolerating regular ensure, will trial prosource gelatein instead. Labs: na 135, K+ 3.0, BUN 27, Cr 1.94, GFR 42, Alb 2.3, Alk Phos 148, Tbili 1.5, H/H 10.6/31.3. Meds: Lasix, zosyn, Kcl, flomax, vancomycin, PRN oxycodone, PRN percocet. Nutrition Related Findings:    NFPE deferred, appears well nourished with central adiposity. Denies difficulty chewing/swallowing. No N/V, +constipation with last BM 10/12. Non-pitting generalized and extremity edema.  Wound Type: None         Current Nutrition Intake & Therapies:    Average Meal Intake: 1-25% (per pt)  Average Supplements Intake: 1-25% (not tolerating per pt)  ADULT DIET; Regular; LIKES: Fresh fruit with each meal (orange or banana); DISLIKES: breakfast sausage  ADULT ORAL NUTRITION SUPPLEMENT; Lunch, Dinner; Protein Modular    Anthropometric Measures:  Height: 188 cm (6' 2.02\")  Ideal Body Weight (IBW): 190 lbs (86 kg)       Current Body Weight: 308 lb 10.3 oz (140 kg), 162.4 % IBW. Weight Source: Bed Scale  Current BMI (kg/m2): 39.6        Weight Adjustment For: No Adjustment                 BMI Categories: Obese Class 2 (BMI 35.0 -39.9)    Estimated Daily Nutrient Needs:  Energy Requirements Based On: Formula  Weight Used for Energy Requirements: Other (Comment) (.8kg (300#))  Energy (kcal/day): 2771 kcals (MSJ x1AF, x1.2SF pna nad SIRS, ?blaDder CA)  Weight Used for Protein Requirements: Adjusted  Protein (g/day): 118-148g (1.2-1.5g/kg adjBW for ? Ca;17-21% kcals from pro)  Method Used for Fluid Requirements: 1 ml/kcal  Fluid (ml/day): 2771 ml    Nutrition Diagnosis:   Inadequate protein-energy intake related to impaired respiratory function (malaise) as evidenced by intake 0-25% (per pt report)    Nutrition Interventions:   Food and/or Nutrient Delivery: Continue Current Diet, Modify Oral Nutrition Supplement  Nutrition Education/Counseling: No recommendation at this time, Counseling needed (pt requesting wt loss counseling)  Coordination of Nutrition Care: Continue to monitor while inpatient  Plan of Care discussed with: pt, guest in room    Goals:     Goals: PO intake 50% or greater, by next RD assessment       Nutrition Monitoring and Evaluation:   Behavioral-Environmental Outcomes: None Identified  Food/Nutrient Intake Outcomes: Diet Advancement/Tolerance, Food and Nutrient Intake, Supplement Intake  Physical Signs/Symptoms Outcomes: Meal Time Behavior, Weight, Biochemical Data, Constipation    Discharge Planning:     Too soon to determine     [employer], RD  Contact: Ext [phone], or via Entrec

## 2023-10-16 NOTE — CONSULTS
CARDIOLOGY CONSULTATION    REASON FOR CONSULT: Pulmonary edema    REQUESTING PROVIDER: Dr. Lira Comfort:  Abdominal and back pain, shortness of breath    HISTORY OF PRESENT ILLNESS:  Meryle Hercules is a 50y.o. year-old male with an unremarkable past medical history who was evaluated today due to pulmonary edema. He presented to the ED with back pain, cellulitis,  abdominal pain. He was admitted and initially treated for SIRS. Bladder mass was found and underwent TURBT, post procedure he developed shortness of breath, found to have pneumonia and pulmonary edema, placed on BiPap and diuresed/given abx. Feeling okay today, good UOP. Now on HFNC. Echo with preserved EF, mild MR. Records from hospital admission course thus far reviewed. Telemetry reviewed. INPATIENT MEDICATIONS:  Home medications reviewed. Current Facility-Administered Medications:     ipratropium 0.5 mg-albuterol 2.5 mg (DUONEB) nebulizer solution 1 Dose, 1 Dose, Inhalation, Q4H PRN, Manny Monsalve MD, 1 Dose at 10/16/23 0754    potassium & sodium phosphates (PHOS-NAK) 280-160-250 MG packet 500 mg, 2 packet, Oral, Q6H RT, Manny Monsalve MD, 500 mg at 10/16/23 0900    [START ON 10/17/2023] Vancomycin - please draw level 10/17 with AM labs.   Thanks!, , Other, Once, Klesy Cameron PA-C    diphenhydrAMINE (BENADRYL) capsule 25 mg, 25 mg, Oral, Q6H PRN, Stephani Parson PA-C    potassium chloride (KLOR-CON M) extended release tablet 40 mEq, 40 mEq, Oral, Daily, Manny Monsalve MD, 40 mEq at 10/16/23 0847    furosemide (LASIX) injection 40 mg, 40 mg, IntraVENous, BID, Stephani Parson PA-C, 40 mg at 10/16/23 0846    melatonin tablet 5 mg, 5 mg, Oral, Nightly PRN, Ramos Gonzalez MD, 5 mg at 10/14/23 2127    LORazepam (ATIVAN) injection 0.5 mg, 0.5 mg, IntraVENous, Q6H PRN, Arpita Reynolds PA-C, 0.5 mg at 10/15/23 2055    hydrocortisone 1 % cream, , Topical, BID, Kelsy Cameron PA-C, Given at 10/16/23 Alert, in NAD, on HFNC  Cardiovascular:  RRR, no murmur  Respiratory:  Lungs with crackles, scattered wheezes  Abdomen:  soft, nontender  Extremities:  right lower extremity edema  Skin:  Dry, warm  Psych:  Normal affect      Vitals:    10/16/23 1151   BP:    Pulse: 81   Resp: (!) 31   Temp:    SpO2: 96%       Recent labs results and imaging reviewed. Discussed case with Dr. Ana Williamson and our impression and recommendations are as follows:  Respiratory failure  Remains on HFNC, pulm treating pneumonia  Echo with normal EF, no obvious diastolic dysfunction or significant valve issues  Continue diuresis with lasix 40 BID, replete K  Strict I&O, daily weights, avoid additional IV fluids  Leg pain, duplexes negative for DVT, consider ELLY  Bladder mass s/p TURBT, per uro  Umbilical hernia, per surgery    Thank you for involving us in the care of this patient. Please do not hesitate to call me or Dr. Ana Williamson if additional questions arise.     SARY Ramirez - MEG  10/16/2023

## 2023-10-16 NOTE — PROGRESS NOTES
Hospitalist Progress Note    NAME:   Martha Gillette   : 1975   MRN: [id number]     Date/Time: 10/16/2023 11:39 AM  Patient PCP: No primary care provider on file. Hospital course:   Martha Gillette is a 50 y.o.  male with no significant past medical history besides umbilical hernia who presented to the emergency department with multiple complaints being lower back pain, chills, myalgias, right lower leg redness and pain, abdominal pain in the area of umbilical hernia. Initial ED work-up concerning with leukocytosis of 18,000. Patient meeting SIRS criteria with tachycardia and leukocytosis. Blood cultures 10/10 obtained and with negative growth thus far. Unclear source but suspected related to right ankle cellulitis. Venous duplex ultrasound negative for DVT but noted right lower extremity lymphadenopathy. CT abdomen pelvis with small fat-containing umbilical hernia with mild inflammation, urinary bladder mass versus protrusion of enlarged prostate. General surgery consulted, Dr. Katz whom patient scheduled for surgery later this month, can continue outpatient surgery, no intervention indicated at this time. CRP and procalcitonin elevated, being treated with IV Vanc and Zosyn. Urology consulted for possible bladder mass. Patient underwent cystoscopy with TURBT 10/12, highly suspicious for cancer, pending pathology. RRT 10/13 for hypoxia. Patient required BiPAP, transferred to the ICU for additional management. ABG with hypoxia and hypercarbia. Chest x-ray found with significant pulmonary edema, IV diuresis started. Pulmonology consulted. CTA with extensive multilobar airspace disease concerning for pneumonia, pulm edema, alveolar hemorrhage. No evidence of PE in the main or lobar arteries but distal branches limited due to severe artifact. Echo with normal EF of 65-7%. Repeat blood cultures pending. Continue IV vancomycin and Zosyn at this time.  Continuing IV diuresis until

## 2023-10-16 NOTE — PROGRESS NOTES
IMPRESSION:   Acute hypoxic respiratory failure  Acute pulmonary edema rule out negative pressure pulmonary edema  Status post bladder tumor TURBT  Acute kidney injury  Hypokalemia  Urinary tract infection  Rule out sepsis with cellulitis right lower extremity  Urinary tract infection with Staph aureus  Pt is at high risk of sudden decline and decompensation with life threatening consequenses and continued end organ dysfunction and failure  Pt is critically ill.  Time spent with pt and staff actively rendering care, managing pt and coordinating care as stated below; 30 minutes, exclusive of any procedures      RECOMMENDATIONS/PLAN:   ICU monitoring  42-year-old male came in with mild GI back pain redness of the lower extremities found to have a bladder tumor underwent TURBT now he is hypoxic in ICU on 100% noninvasive ventilator  BIPAP for non invasive ventilatory life support to avoid worsening respiratory acidosis, hypercacarbic or hypoxic respiratory arrest  CAT scan of the chest was done which shows fluid overload groundglass opacity possible negative pressure pulmonary edema possible infiltrate rule out ARDS or acute lung injury  Supplement potassium we will get magnesium level  Continue with high-dose Lasix monitor BUN and creatinine  Arterial blood gas still shows hypoxia on 70% FiO2 PO2 52 PCO2 34 PO2 56 now is on high flow nasal cannula  UTI with Staph aureus  2D echo shows ejection fraction 65 to 70% RVSP of 23  Worsening of the renal function change Lasix to 40 twice daily  Intubate and place on vent if NIV fails  Sleep study as an outpatient he needs CPAP machine  Agree with Empiric IV antibiotics pending culture results   Follow culture results  CVP monitoring  IV vasopressors for circulatory shock refractory to fluids to maintain SBP> 90  Transfuse prn to maintain Hgb > 7  Labs to follow electrolytes, renal function and and blood counts  Bronchial hygiene with respiratory therapy techniques, been authorized by the FDA under an Emergency Use Authorization (EUA) for use by authorized laboratories.    Fact sheet for Healthcare Providers:  http://www.joan.sharri/ Fact sheet for Patients: http://www.joan.sharri/   Methodology: Isothermal Nucleic Acid Amplification       Rapid influenza A/B antigens [[id number]] Collected: 10/10/23 2003    Order Status: Completed Specimen: Nasal Washing Updated: 10/10/23 2120     Influenza A Ag Negative        Influenza B Ag Negative       COVID-19 & Influenza Combo [[id number]]     Order Status: Canceled Specimen: Nasopharyngeal Swab     Blood Culture 1 [[id number]] Collected: 10/10/23 1516    Order Status: Completed Specimen: Blood Updated: 10/16/23 0749     Special Requests No Special Requests        Culture No growth 6 days       Blood Culture 2 [[id number]] Collected: 10/10/23 1516    Order Status: Completed Specimen: Blood Updated: 10/16/23 0749     Special Requests No Special Requests        Culture No growth 6 days       Culture, Urine [[id number]]  (Abnormal)  (Susceptibility) Collected: 10/10/23 1332    Order Status: Completed Specimen: Urine Updated: 10/13/23 1009     Special Requests No Special Requests        Oxly count 15,000        Oxly count colonies/ml        Culture Staphylococcus aureus       Susceptibility        Staphylococcus aureus      BACTERIAL SUSCEPTIBILITY PANEL EH      ciprofloxacin >=8 ug/mL Resistant      DAPTOmycin 0.5 ug/mL Sensitive      doxycycline <=0.5 ug/mL Sensitive      gentamicin <=0.5 ug/mL Sensitive      levofloxacin 4 ug/mL Intermediate      linezolid 2 ug/mL Sensitive      moxifloxacin 1 ug/mL Sensitive      nitrofurantoin <=16 ug/mL Sensitive      oxacillin 0.5 ug/mL Sensitive      rifampin <=0.5 ug/mL Sensitive  [1]       tetracycline <=1 ug/mL Sensitive      trimethoprim-sulfamethoxazole <=10 ug/mL Sensitive      vancomycin 1 ug/mL Sensitive                   [1]  Rifampin is not to be cellulitis. COMPARISON: None TECHNIQUE: Helical CT of the right lower extremity from the distal femur to the talus with coronal and sagittal reformats. Images reviewed in soft tissue and bone windows. CT dose reduction was achieved through the use of a standardized protocol tailored for this examination and automatic exposure control for dose modulation. CONTRAST: None. FINDINGS: Bones: Normal bone mineralization. No fracture, dislocation, or periosteal reaction. Joint fluid: None. Articulations: No significant osteoarthritis. No evidence of inflammatory arthritis. Tendons: No full-thickness tendon tear. Muscles: Mild diffuse atrophy. Soft tissue mass: No measurable mass. Fluid collection: No drainable fluid collection. Miscellaneous: Mild edema in the subcutaneous adipose tissues is predominantly anterior to the tibia. Skin thickening is predominantly anterior and medial in the distal leg. CT findings are compatible with the clinical diagnosis of cellulitis. No evidence of abscess. No soft tissue gas. No fracture or osteomyelitis. CTA CHEST W WO CONTRAST    Result Date: 10/13/2023  EXAM:  CTA CHEST W WO CONTRAST INDICATION: Hemoptysis COMPARISON: None. TECHNIQUE: Helical thin section chest CT following uneventful intravenous administration of nonionic contrast 100 mL of isovue 370 according to departmental PE protocol. Coronal and sagittal reformats were performed. 3D post processing was performed. CT dose reduction was achieved through the use of a standardized protocol tailored for this examination and automatic exposure control for dose modulation. FINDINGS: Evaluation of distal branches is limited by extensive motion artifact. No filling defects in the main or lobar arteries. THYROID: No nodule. MEDIASTINUM: No mass or lymphadenopathy. ALFREDO: No mass or lymphadenopathy. THORACIC AORTA: No aneurysm. HEART: Normal in size. ESOPHAGUS: No wall thickening or dilatation. TRACHEA/BRONCHI: Patent.  PLEURA: No

## 2023-10-16 NOTE — CARE COORDINATION
Patient transferred from  to ICU. Currently on 50L hi flow. Wean as appropriate. No home O2. Home O2 TBD.        Eriberto Gibson, CANDI

## 2023-10-16 NOTE — DISCHARGE INSTR - DIET
Good nutrition is important when healing from an illness, injury, or surgery. Follow any nutrition recommendations given to you during your hospital stay. If you have any questions about your diet or nutrition, call the hospital and ask for the dietitian. You voiced interest in weight loss counseling. Consider making an outpatient appointment for Nutrition Counseling.   To make an appointment, please call [phone]

## 2023-10-17 ENCOUNTER — APPOINTMENT (OUTPATIENT)
Facility: HOSPITAL | Age: 48
DRG: 446 | End: 2023-10-17
Payer: MEDICAID

## 2023-10-17 LAB
ALBUMIN SERPL-MCNC: 2.3 G/DL (ref 3.5–5)
ANION GAP SERPL CALC-SCNC: 7 MMOL/L (ref 5–15)
ARTERIAL PATENCY WRIST A: YES
BASE EXCESS BLDA CALC-SCNC: 6.1 MMOL/L (ref 0–3)
BASOPHILS # BLD: 0.1 K/UL (ref 0–0.1)
BASOPHILS NFR BLD: 1 % (ref 0–1)
BDY SITE: ABNORMAL
BODY TEMPERATURE: 99
BUN SERPL-MCNC: 26 MG/DL (ref 6–20)
BUN/CREAT SERPL: 14 (ref 12–20)
CA-I BLD-MCNC: 9.3 MG/DL (ref 8.5–10.1)
CHLORIDE SERPL-SCNC: 100 MMOL/L (ref 97–108)
CHLORIDE UR-SCNC: 58 MMOL/L
CO2 SERPL-SCNC: 29 MMOL/L (ref 21–32)
COHGB MFR BLD: 0.2 % (ref 1–2)
CREAT SERPL-MCNC: 1.88 MG/DL (ref 0.7–1.3)
DIFFERENTIAL METHOD BLD: ABNORMAL
EOSINOPHIL # BLD: 0.3 K/UL (ref 0–0.4)
EOSINOPHIL NFR BLD: 3 % (ref 0–7)
ERYTHROCYTE [DISTWIDTH] IN BLOOD BY AUTOMATED COUNT: 14.3 % (ref 11.5–14.5)
FIO2 ON VENT: 70 %
GLUCOSE SERPL-MCNC: 131 MG/DL (ref 65–100)
HCO3 BLDA-SCNC: 31 MMOL/L (ref 22–26)
HCT VFR BLD AUTO: 29.4 % (ref 36.6–50.3)
HGB BLD-MCNC: 9.9 G/DL (ref 12.1–17)
IMM GRANULOCYTES # BLD AUTO: 0 K/UL
IMM GRANULOCYTES NFR BLD AUTO: 0 %
LYMPHOCYTES # BLD: 2.1 K/UL (ref 0.8–3.5)
LYMPHOCYTES NFR BLD: 25 % (ref 12–49)
MAGNESIUM SERPL-MCNC: 2.3 MG/DL (ref 1.6–2.4)
MCH RBC QN AUTO: 29.9 PG (ref 26–34)
MCHC RBC AUTO-ENTMCNC: 33.7 G/DL (ref 30–36.5)
MCV RBC AUTO: 88.8 FL (ref 80–99)
METAMYELOCYTES NFR BLD MANUAL: 2 %
METHGB MFR BLD: 0.4 % (ref 0–1.4)
MONOCYTES # BLD: 0.8 K/UL (ref 0–1)
MONOCYTES NFR BLD: 9 % (ref 5–13)
MYELOCYTES NFR BLD MANUAL: 6 %
NEUTS SEG # BLD: 4.5 K/UL (ref 1.8–8)
NEUTS SEG NFR BLD: 54 % (ref 32–75)
NRBC # BLD: 0 K/UL (ref 0–0.01)
NRBC BLD-RTO: 0 PER 100 WBC
OXYHGB MFR BLD: 98.6 % (ref 95–99)
PCO2 BLDA: 44 MMHG (ref 35–45)
PERFORMED BY:: ABNORMAL
PH BLDA: 7.46 (ref 7.35–7.45)
PHOSPHATE SERPL-MCNC: 4.2 MG/DL (ref 2.6–4.7)
PLATELET # BLD AUTO: 236 K/UL (ref 150–400)
PMV BLD AUTO: 9.9 FL (ref 8.9–12.9)
PO2 BLDA: 229 MMHG (ref 80–100)
POTASSIUM SERPL-SCNC: 3 MMOL/L (ref 3.5–5.1)
RBC # BLD AUTO: 3.31 M/UL (ref 4.1–5.7)
RBC MORPH BLD: ABNORMAL
SAO2 % BLD: 99 % (ref 95–99)
SAO2% DEVICE SAO2% SENSOR NAME: ABNORMAL
SODIUM SERPL-SCNC: 136 MMOL/L (ref 136–145)
SODIUM UR-SCNC: 67 MMOL/L
SPECIMEN SITE: ABNORMAL
VANCOMYCIN SERPL-MCNC: 14.4 UG/ML
WBC # BLD AUTO: 8.4 K/UL (ref 4.1–11.1)

## 2023-10-17 PROCEDURE — 6370000000 HC RX 637 (ALT 250 FOR IP): Performed by: HOSPITALIST

## 2023-10-17 PROCEDURE — 80069 RENAL FUNCTION PANEL: CPT

## 2023-10-17 PROCEDURE — 6360000002 HC RX W HCPCS: Performed by: HOSPITALIST

## 2023-10-17 PROCEDURE — 82436 ASSAY OF URINE CHLORIDE: CPT

## 2023-10-17 PROCEDURE — 82803 BLOOD GASES ANY COMBINATION: CPT

## 2023-10-17 PROCEDURE — 2580000003 HC RX 258: Performed by: INTERNAL MEDICINE

## 2023-10-17 PROCEDURE — 99233 SBSQ HOSP IP/OBS HIGH 50: CPT | Performed by: INTERNAL MEDICINE

## 2023-10-17 PROCEDURE — 80202 ASSAY OF VANCOMYCIN: CPT

## 2023-10-17 PROCEDURE — 6360000002 HC RX W HCPCS: Performed by: PHYSICIAN ASSISTANT

## 2023-10-17 PROCEDURE — 36415 COLL VENOUS BLD VENIPUNCTURE: CPT

## 2023-10-17 PROCEDURE — 2580000003 HC RX 258: Performed by: STUDENT IN AN ORGANIZED HEALTH CARE EDUCATION/TRAINING PROGRAM

## 2023-10-17 PROCEDURE — 94761 N-INVAS EAR/PLS OXIMETRY MLT: CPT

## 2023-10-17 PROCEDURE — 87449 NOS EACH ORGANISM AG IA: CPT

## 2023-10-17 PROCEDURE — 99232 SBSQ HOSP IP/OBS MODERATE 35: CPT | Performed by: SURGERY

## 2023-10-17 PROCEDURE — 36600 WITHDRAWAL OF ARTERIAL BLOOD: CPT

## 2023-10-17 PROCEDURE — 85025 COMPLETE CBC W/AUTO DIFF WBC: CPT

## 2023-10-17 PROCEDURE — 6360000002 HC RX W HCPCS: Performed by: STUDENT IN AN ORGANIZED HEALTH CARE EDUCATION/TRAINING PROGRAM

## 2023-10-17 PROCEDURE — 71045 X-RAY EXAM CHEST 1 VIEW: CPT

## 2023-10-17 PROCEDURE — 6370000000 HC RX 637 (ALT 250 FOR IP): Performed by: UROLOGY

## 2023-10-17 PROCEDURE — 6370000000 HC RX 637 (ALT 250 FOR IP): Performed by: INTERNAL MEDICINE

## 2023-10-17 PROCEDURE — 6370000000 HC RX 637 (ALT 250 FOR IP): Performed by: EMERGENCY MEDICINE

## 2023-10-17 PROCEDURE — 2000000000 HC ICU R&B

## 2023-10-17 PROCEDURE — 6360000002 HC RX W HCPCS: Performed by: INTERNAL MEDICINE

## 2023-10-17 PROCEDURE — 84300 ASSAY OF URINE SODIUM: CPT

## 2023-10-17 PROCEDURE — 2700000000 HC OXYGEN THERAPY PER DAY

## 2023-10-17 PROCEDURE — 83735 ASSAY OF MAGNESIUM: CPT

## 2023-10-17 RX ORDER — POTASSIUM CHLORIDE 20 MEQ/1
40 TABLET, EXTENDED RELEASE ORAL EVERY 4 HOURS
Status: COMPLETED | OUTPATIENT
Start: 2023-10-17 | End: 2023-10-17

## 2023-10-17 RX ORDER — POTASSIUM CHLORIDE 7.45 MG/ML
10 INJECTION INTRAVENOUS
Status: DISPENSED | OUTPATIENT
Start: 2023-10-17 | End: 2023-10-17

## 2023-10-17 RX ORDER — SPIRONOLACTONE 25 MG/1
25 TABLET ORAL DAILY
Status: DISCONTINUED | OUTPATIENT
Start: 2023-10-17 | End: 2023-10-20 | Stop reason: HOSPADM

## 2023-10-17 RX ADMIN — LORAZEPAM 0.5 MG: 2 INJECTION INTRAMUSCULAR; INTRAVENOUS at 23:27

## 2023-10-17 RX ADMIN — SPIRONOLACTONE 25 MG: 25 TABLET ORAL at 17:14

## 2023-10-17 RX ADMIN — POTASSIUM CHLORIDE 40 MEQ: 1500 TABLET, EXTENDED RELEASE ORAL at 06:49

## 2023-10-17 RX ADMIN — PIPERACILLIN AND TAZOBACTAM 3375 MG: 3; .375 INJECTION, POWDER, LYOPHILIZED, FOR SOLUTION INTRAVENOUS at 12:58

## 2023-10-17 RX ADMIN — SODIUM CHLORIDE, PRESERVATIVE FREE 10 ML: 5 INJECTION INTRAVENOUS at 20:22

## 2023-10-17 RX ADMIN — POTASSIUM CHLORIDE 10 MEQ: 7.46 INJECTION, SOLUTION INTRAVENOUS at 06:50

## 2023-10-17 RX ADMIN — LORAZEPAM 0.5 MG: 2 INJECTION INTRAMUSCULAR; INTRAVENOUS at 05:08

## 2023-10-17 RX ADMIN — HYDROCORTISONE: 0.01 CREAM TOPICAL at 09:13

## 2023-10-17 RX ADMIN — FUROSEMIDE 40 MG: 10 INJECTION, SOLUTION INTRAMUSCULAR; INTRAVENOUS at 09:04

## 2023-10-17 RX ADMIN — FUROSEMIDE 40 MG: 10 INJECTION, SOLUTION INTRAMUSCULAR; INTRAVENOUS at 16:15

## 2023-10-17 RX ADMIN — METHYLPREDNISOLONE SODIUM SUCCINATE 40 MG: 40 INJECTION, POWDER, FOR SOLUTION INTRAMUSCULAR; INTRAVENOUS at 16:28

## 2023-10-17 RX ADMIN — Medication 95 MG: at 16:14

## 2023-10-17 RX ADMIN — SODIUM CHLORIDE, PRESERVATIVE FREE 10 ML: 5 INJECTION INTRAVENOUS at 09:14

## 2023-10-17 RX ADMIN — POTASSIUM & SODIUM PHOSPHATES POWDER PACK 280-160-250 MG 500 MG: 280-160-250 PACK at 01:45

## 2023-10-17 RX ADMIN — TAMSULOSIN HYDROCHLORIDE 0.4 MG: 0.4 CAPSULE ORAL at 09:14

## 2023-10-17 RX ADMIN — PIPERACILLIN AND TAZOBACTAM 3375 MG: 3; .375 INJECTION, POWDER, LYOPHILIZED, FOR SOLUTION INTRAVENOUS at 20:21

## 2023-10-17 RX ADMIN — METHYLPREDNISOLONE SODIUM SUCCINATE 40 MG: 40 INJECTION, POWDER, FOR SOLUTION INTRAMUSCULAR; INTRAVENOUS at 09:41

## 2023-10-17 RX ADMIN — LORAZEPAM 0.5 MG: 2 INJECTION INTRAMUSCULAR; INTRAVENOUS at 16:25

## 2023-10-17 RX ADMIN — Medication 95 MG: at 09:03

## 2023-10-17 RX ADMIN — DICLOFENAC SODIUM 2 G: 10 GEL TOPICAL at 05:03

## 2023-10-17 RX ADMIN — HYDROCORTISONE: 0.01 CREAM TOPICAL at 20:21

## 2023-10-17 RX ADMIN — POTASSIUM CHLORIDE 40 MEQ: 1500 TABLET, EXTENDED RELEASE ORAL at 09:03

## 2023-10-17 RX ADMIN — PIPERACILLIN AND TAZOBACTAM 3375 MG: 3; .375 INJECTION, POWDER, LYOPHILIZED, FOR SOLUTION INTRAVENOUS at 00:00

## 2023-10-17 ASSESSMENT — PAIN SCALES - GENERAL
PAINLEVEL_OUTOF10: 0

## 2023-10-17 ASSESSMENT — ENCOUNTER SYMPTOMS
SHORTNESS OF BREATH: 1
EYES NEGATIVE: 1
ALLERGIC/IMMUNOLOGIC NEGATIVE: 1
GASTROINTESTINAL NEGATIVE: 1
COUGH: 1
WHEEZING: 1

## 2023-10-17 NOTE — PROGRESS NOTES
CARDIOLOGY PROGRESS NOTE      Patient Name: Kerry Terrell  Age: 50 y.o. Gender:male  :1975  MRN: [id number]    Patient seen and examined. This is a patient with a history of back pain, cellulitis,  abdominal pain who presented with abdominal and back pain now being followed for pulmonary edema. Resting in bed, on HFNC. Tired today, did not sleep well. No chest pain. No other complaints reported. Telemetry reviewed, there were no events noted in the past 24 hours. Pertinent review of systems items noted above, all other systems are negative. Current medications reviewed. Physical Examination    No Known Allergies  Vitals:    10/17/23 1200   BP: 121/76   Pulse: 80   Resp: 26   Temp:    SpO2: 96%     Vital signs are stable  No apparent distress, on HFNC  Heart has a regular rate and rhythm. no murmur  Lungs with wheezes and crackles  Abdomen is soft, nontender, normal bowel sounds. Extremities have mild edema  Skin is dry and warm.   Normal affect    Labs reviewed:  Recent Results (from the past 12 hour(s))   Vancomycin Level, Random    Collection Time: 10/17/23  3:30 AM   Result Value Ref Range    Vancomycin Rm 14.4 ug/mL   CBC with Auto Differential    Collection Time: 10/17/23  3:30 AM   Result Value Ref Range    WBC 8.4 4.1 - 11.1 K/uL    RBC 3.31 (L) 4.10 - 5.70 M/uL    Hemoglobin 9.9 (L) 12.1 - 17.0 g/dL    Hematocrit 29.4 (L) 36.6 - 50.3 %    MCV 88.8 80.0 - 99.0 FL    MCH 29.9 26.0 - 34.0 PG    MCHC 33.7 30.0 - 36.5 g/dL    RDW 14.3 11.5 - 14.5 %    Platelets 310 960 - 838 K/uL    MPV 9.9 8.9 - 12.9 FL    Nucleated RBCs 0.0 0.0  WBC    nRBC 0.00 0.00 - 0.01 K/uL    Neutrophils % 54 32 - 75 %    Lymphocytes % 25 12 - 49 %    Monocytes % 9 5 - 13 %    Eosinophils % 3 0 - 7 %    Basophils % 1 0 - 1 %    Metamyelocytes 2 (H) 0 %    Myelocytes 6 (H) 0 %    Immature Granulocytes 0 %    Neutrophils Absolute 4.5 1.8 - 8.0 K/UL    Lymphocytes Absolute 2.1 0.8 - 3.5 K/UL    Monocytes

## 2023-10-17 NOTE — PROGRESS NOTES
IMPRESSION:   Acute hypoxic respiratory failure  Acute pulmonary edema rule out negative pressure pulmonary edema  Bilateral pulmonary infiltrate rule out ARDS  Status post bladder tumor TURBT  Acute kidney injury  Hypokalemia  Urinary tract infection  Rule out sepsis with cellulitis right lower extremity  Urinary tract infection with Staph aureus  Pt is at high risk of sudden decline and decompensation with life threatening consequenses and continued end organ dysfunction and failure  Pt is critically ill.  Time spent with pt and staff actively rendering care, managing pt and coordinating care as stated below; 30 minutes, exclusive of any procedures      RECOMMENDATIONS/PLAN:   ICU monitoring  59-year-old male came in with mild GI back pain redness of the lower extremities found to have a bladder tumor underwent TURBT now he is hypoxic in ICU on 100% noninvasive ventilator  BIPAP for non invasive ventilatory life support to avoid worsening respiratory acidosis, hypercacarbic or hypoxic respiratory arrest  CAT scan of the chest was done which shows fluid overload groundglass opacity possible negative pressure pulmonary edema possible infiltrate rule out ARDS or acute lung injury  Chest x-ray has not changed still shows bilateral infiltrate echo shows normal ejection fraction no systolic or diastolic dysfunction  Continue with high-dose Lasix monitor BUN and creatinine  Arterial blood gas still shows hypoxia on 70% FiO2 PO2 52 PCO2 34 PO2 56 now is on high flow nasal cannula  UTI with Staph aureus  We will start patient on high-dose steroid  2D echo shows ejection fraction 65 to 70% RVSP of 23  Worsening of the renal function change Lasix to 40 twice daily  Intubate and place on vent if NIV fails  Sleep study as an outpatient he needs CPAP machine  Agree with Empiric IV antibiotics pending culture results   Follow culture results  CVP monitoring  IV vasopressors for circulatory shock refractory to fluids to maintain disease. Interval development of diffuse bilateral airspace disease, likely representing pneumonia. CT LOWER EXTREMITY WO CONTRAST    Result Date: 10/13/2023  EXAM: CT LOWER EXTREMITY WO CONTRAST INDICATION: Right lower extremity pain and clinical diagnosis of cellulitis. COMPARISON: None TECHNIQUE: Helical CT of the right lower extremity from the distal femur to the talus with coronal and sagittal reformats. Images reviewed in soft tissue and bone windows. CT dose reduction was achieved through the use of a standardized protocol tailored for this examination and automatic exposure control for dose modulation. CONTRAST: None. FINDINGS: Bones: Normal bone mineralization. No fracture, dislocation, or periosteal reaction. Joint fluid: None. Articulations: No significant osteoarthritis. No evidence of inflammatory arthritis. Tendons: No full-thickness tendon tear. Muscles: Mild diffuse atrophy. Soft tissue mass: No measurable mass. Fluid collection: No drainable fluid collection. Miscellaneous: Mild edema in the subcutaneous adipose tissues is predominantly anterior to the tibia. Skin thickening is predominantly anterior and medial in the distal leg. CT findings are compatible with the clinical diagnosis of cellulitis. No evidence of abscess. No soft tissue gas. No fracture or osteomyelitis. CTA CHEST W WO CONTRAST    Result Date: 10/13/2023  EXAM:  CTA CHEST W WO CONTRAST INDICATION: Hemoptysis COMPARISON: None. TECHNIQUE: Helical thin section chest CT following uneventful intravenous administration of nonionic contrast 100 mL of isovue 370 according to departmental PE protocol. Coronal and sagittal reformats were performed. 3D post processing was performed. CT dose reduction was achieved through the use of a standardized protocol tailored for this examination and automatic exposure control for dose modulation. FINDINGS: Evaluation of distal branches is limited by extensive motion artifact.  No filling

## 2023-10-17 NOTE — PROGRESS NOTES
HOSPITALIST PROGRESS NOTE  Lisa Lee MD, [address]         Daily Progress Note: 10/17/2023  50 y.o.  male with no significant past medical history besides umbilical hernia who presented to the emergency department with multiple complaints being lower back pain, chills, myalgias, right lower leg redness and pain, abdominal pain in the area of umbilical hernia. Initial ED work-up concerning with leukocytosis of 18,000. Patient meeting SIRS criteria with tachycardia and leukocytosis. Blood cultures 10/10 obtained and with negative growth thus far. Unclear source but suspected related to right ankle cellulitis. Venous duplex ultrasound negative for DVT but noted right lower extremity lymphadenopathy. CT abdomen pelvis with small fat-containing umbilical hernia with mild inflammation, urinary bladder mass versus protrusion of enlarged prostate. General surgery consulted, Dr. Katz whom patient scheduled for surgery later this month, can continue outpatient surgery, no intervention indicated at this time. CRP and procalcitonin elevated, being treated with IV Vanc and Zosyn. Urology consulted for possible bladder mass. Patient underwent cystoscopy with TURBT 10/12, highly suspicious for cancer, pending pathology. RRT 10/13 for hypoxia. Patient required BiPAP, transferred to the ICU for additional management. ABG with hypoxia and hypercarbia. Chest x-ray found with significant pulmonary edema, IV diuresis started. Pulmonology consulted. CTA with extensive multilobar airspace disease concerning for pneumonia, pulm edema, alveolar hemorrhage. No evidence of PE in the main or lobar arteries but distal branches limited due to severe artifact. Echo with normal EF of 65-7%. Repeat blood cultures pending. Continue IV vancomycin and Zosyn at this time.  Continuing IV diuresis until respiratory function treatment rendered and patient's response to those treatments. In addition, prior medical, surgical and relevant social and family histories were reviewed. I have discussed management plan with patient/family and with nursing staff. Toxic drug monitoring:      Care Plan discussed with: Patient/Family and Nurse    Total time spent with patient: 30 minutes. With greater than 50% spent in coordination of care and counseling.     Hemant Og MD

## 2023-10-18 ENCOUNTER — APPOINTMENT (OUTPATIENT)
Facility: HOSPITAL | Age: 48
DRG: 446 | End: 2023-10-18
Payer: MEDICAID

## 2023-10-18 PROBLEM — L03.115 CELLULITIS OF LEG, RIGHT: Status: ACTIVE | Noted: 2023-10-18

## 2023-10-18 PROBLEM — A41.01 SEPSIS DUE TO METHICILLIN SUSCEPTIBLE STAPHYLOCOCCUS AUREUS (MSSA) WITHOUT ACUTE ORGAN DYSFUNCTION (HCC): Status: ACTIVE | Noted: 2023-10-18

## 2023-10-18 PROBLEM — N39.0 COMPLICATED UTI (URINARY TRACT INFECTION): Status: ACTIVE | Noted: 2023-10-18

## 2023-10-18 PROBLEM — J18.9 PNEUMONIA OF BOTH LUNGS DUE TO INFECTIOUS ORGANISM: Status: ACTIVE | Noted: 2023-10-18

## 2023-10-18 LAB
ALBUMIN SERPL-MCNC: 2.6 G/DL (ref 3.5–5)
ANION GAP SERPL CALC-SCNC: 6 MMOL/L (ref 5–15)
ARTERIAL PATENCY WRIST A: YES
BASE EXCESS BLDA CALC-SCNC: 6.9 MMOL/L (ref 0–3)
BASOPHILS # BLD: 0 K/UL (ref 0–0.1)
BASOPHILS NFR BLD: 0 % (ref 0–1)
BDY SITE: ABNORMAL
BODY TEMPERATURE: 98
BUN SERPL-MCNC: 32 MG/DL (ref 6–20)
BUN/CREAT SERPL: 18 (ref 12–20)
CA-I BLD-MCNC: 1.22 MMOL/L (ref 1.13–1.32)
CA-I BLD-MCNC: 9.8 MG/DL (ref 8.5–10.1)
CHLORIDE SERPL-SCNC: 102 MMOL/L (ref 97–108)
CK SERPL-CCNC: 54 U/L (ref 39–308)
CO2 SERPL-SCNC: 27 MMOL/L (ref 21–32)
COHGB MFR BLD: 0.5 % (ref 1–2)
CREAT SERPL-MCNC: 1.78 MG/DL (ref 0.7–1.3)
CRP SERPL-MCNC: 9.88 MG/DL (ref 0–0.6)
DIFFERENTIAL METHOD BLD: ABNORMAL
EOSINOPHIL # BLD: 0 K/UL (ref 0–0.4)
EOSINOPHIL NFR BLD: 0 % (ref 0–7)
ERYTHROCYTE [DISTWIDTH] IN BLOOD BY AUTOMATED COUNT: 14.2 % (ref 11.5–14.5)
FIO2 ON VENT: 35 %
GLUCOSE SERPL-MCNC: 162 MG/DL (ref 65–100)
HCO3 BLDA-SCNC: 31 MMOL/L (ref 22–26)
HCT VFR BLD AUTO: 33.1 % (ref 36.6–50.3)
HGB BLD-MCNC: 11.8 G/DL (ref 12.1–17)
IMM GRANULOCYTES # BLD AUTO: 0 K/UL
IMM GRANULOCYTES NFR BLD AUTO: 0 %
LYMPHOCYTES # BLD: 1 K/UL (ref 0.8–3.5)
LYMPHOCYTES NFR BLD: 8 % (ref 12–49)
M PNEUMO IGM SER IA-ACNC: NONREACTIVE
MCH RBC QN AUTO: 31.9 PG (ref 26–34)
MCHC RBC AUTO-ENTMCNC: 35.6 G/DL (ref 30–36.5)
MCV RBC AUTO: 89.5 FL (ref 80–99)
METAMYELOCYTES NFR BLD MANUAL: 2 %
METHGB MFR BLD: 0.2 % (ref 0–1.4)
MONOCYTES # BLD: 0.3 K/UL (ref 0–1)
MONOCYTES NFR BLD: 2 % (ref 5–13)
NEUTS BAND NFR BLD MANUAL: 3 % (ref 0–6)
NEUTS SEG # BLD: 11.4 K/UL (ref 1.8–8)
NEUTS SEG NFR BLD: 85 % (ref 32–75)
NRBC # BLD: 0 K/UL (ref 0–0.01)
NRBC BLD-RTO: 0 PER 100 WBC
OXYHGB MFR BLD: 91.7 % (ref 95–99)
PCO2 BLDA: 41 MMHG (ref 35–45)
PERFORMED BY:: ABNORMAL
PH BLDA: 7.5 (ref 7.35–7.45)
PHOSPHATE SERPL-MCNC: 3.4 MG/DL (ref 2.6–4.7)
PLATELET # BLD AUTO: 339 K/UL (ref 150–400)
PMV BLD AUTO: 9.8 FL (ref 8.9–12.9)
PO2 BLDA: 63 MMHG (ref 80–100)
POTASSIUM SERPL-SCNC: 3.9 MMOL/L (ref 3.5–5.1)
PROCALCITONIN SERPL-MCNC: 0.22 NG/ML
RBC # BLD AUTO: 3.7 M/UL (ref 4.1–5.7)
RBC MORPH BLD: ABNORMAL
SAO2 % BLD: 92 % (ref 95–99)
SAO2% DEVICE SAO2% SENSOR NAME: ABNORMAL
SODIUM SERPL-SCNC: 135 MMOL/L (ref 136–145)
SPECIMEN SITE: ABNORMAL
WBC # BLD AUTO: 12.9 K/UL (ref 4.1–11.1)

## 2023-10-18 PROCEDURE — 2700000000 HC OXYGEN THERAPY PER DAY

## 2023-10-18 PROCEDURE — 6360000002 HC RX W HCPCS: Performed by: STUDENT IN AN ORGANIZED HEALTH CARE EDUCATION/TRAINING PROGRAM

## 2023-10-18 PROCEDURE — 2580000003 HC RX 258: Performed by: INTERNAL MEDICINE

## 2023-10-18 PROCEDURE — 86738 MYCOPLASMA ANTIBODY: CPT

## 2023-10-18 PROCEDURE — 86140 C-REACTIVE PROTEIN: CPT

## 2023-10-18 PROCEDURE — 2000000000 HC ICU R&B

## 2023-10-18 PROCEDURE — 71045 X-RAY EXAM CHEST 1 VIEW: CPT

## 2023-10-18 PROCEDURE — 82803 BLOOD GASES ANY COMBINATION: CPT

## 2023-10-18 PROCEDURE — 36415 COLL VENOUS BLD VENIPUNCTURE: CPT

## 2023-10-18 PROCEDURE — 6360000002 HC RX W HCPCS: Performed by: PHYSICIAN ASSISTANT

## 2023-10-18 PROCEDURE — 85025 COMPLETE CBC W/AUTO DIFF WBC: CPT

## 2023-10-18 PROCEDURE — 6370000000 HC RX 637 (ALT 250 FOR IP): Performed by: INTERNAL MEDICINE

## 2023-10-18 PROCEDURE — 82330 ASSAY OF CALCIUM: CPT

## 2023-10-18 PROCEDURE — 94761 N-INVAS EAR/PLS OXIMETRY MLT: CPT

## 2023-10-18 PROCEDURE — 36600 WITHDRAWAL OF ARTERIAL BLOOD: CPT

## 2023-10-18 PROCEDURE — 80069 RENAL FUNCTION PANEL: CPT

## 2023-10-18 PROCEDURE — 6370000000 HC RX 637 (ALT 250 FOR IP): Performed by: EMERGENCY MEDICINE

## 2023-10-18 PROCEDURE — 82550 ASSAY OF CK (CPK): CPT

## 2023-10-18 PROCEDURE — 6360000002 HC RX W HCPCS: Performed by: INTERNAL MEDICINE

## 2023-10-18 PROCEDURE — 6370000000 HC RX 637 (ALT 250 FOR IP): Performed by: UROLOGY

## 2023-10-18 PROCEDURE — 2580000003 HC RX 258: Performed by: STUDENT IN AN ORGANIZED HEALTH CARE EDUCATION/TRAINING PROGRAM

## 2023-10-18 PROCEDURE — 99233 SBSQ HOSP IP/OBS HIGH 50: CPT | Performed by: INTERNAL MEDICINE

## 2023-10-18 PROCEDURE — 84145 PROCALCITONIN (PCT): CPT

## 2023-10-18 RX ORDER — POTASSIUM CHLORIDE 20 MEQ/1
20 TABLET, EXTENDED RELEASE ORAL ONCE
Status: COMPLETED | OUTPATIENT
Start: 2023-10-18 | End: 2023-10-18

## 2023-10-18 RX ADMIN — FUROSEMIDE 40 MG: 10 INJECTION, SOLUTION INTRAMUSCULAR; INTRAVENOUS at 08:20

## 2023-10-18 RX ADMIN — SPIRONOLACTONE 25 MG: 25 TABLET ORAL at 08:20

## 2023-10-18 RX ADMIN — TAMSULOSIN HYDROCHLORIDE 0.4 MG: 0.4 CAPSULE ORAL at 08:20

## 2023-10-18 RX ADMIN — Medication 95 MG: at 17:43

## 2023-10-18 RX ADMIN — METHYLPREDNISOLONE SODIUM SUCCINATE 40 MG: 40 INJECTION, POWDER, FOR SOLUTION INTRAMUSCULAR; INTRAVENOUS at 08:20

## 2023-10-18 RX ADMIN — HYDROCORTISONE: 0.01 CREAM TOPICAL at 20:14

## 2023-10-18 RX ADMIN — Medication 95 MG: at 09:33

## 2023-10-18 RX ADMIN — Medication 5 MG: at 23:13

## 2023-10-18 RX ADMIN — Medication 95 MG: at 13:17

## 2023-10-18 RX ADMIN — FUROSEMIDE 40 MG: 10 INJECTION, SOLUTION INTRAMUSCULAR; INTRAVENOUS at 17:44

## 2023-10-18 RX ADMIN — PIPERACILLIN AND TAZOBACTAM 3375 MG: 3; .375 INJECTION, POWDER, LYOPHILIZED, FOR SOLUTION INTRAVENOUS at 20:14

## 2023-10-18 RX ADMIN — OXYCODONE AND ACETAMINOPHEN 1 TABLET: 5; 325 TABLET ORAL at 14:23

## 2023-10-18 RX ADMIN — SODIUM CHLORIDE, PRESERVATIVE FREE 10 ML: 5 INJECTION INTRAVENOUS at 20:14

## 2023-10-18 RX ADMIN — LORAZEPAM 0.5 MG: 2 INJECTION INTRAMUSCULAR; INTRAVENOUS at 09:27

## 2023-10-18 RX ADMIN — OXYCODONE AND ACETAMINOPHEN 1 TABLET: 5; 325 TABLET ORAL at 08:20

## 2023-10-18 RX ADMIN — METHYLPREDNISOLONE SODIUM SUCCINATE 40 MG: 40 INJECTION, POWDER, FOR SOLUTION INTRAMUSCULAR; INTRAVENOUS at 17:44

## 2023-10-18 RX ADMIN — METHYLPREDNISOLONE SODIUM SUCCINATE 40 MG: 40 INJECTION, POWDER, FOR SOLUTION INTRAMUSCULAR; INTRAVENOUS at 00:00

## 2023-10-18 RX ADMIN — LORAZEPAM 0.5 MG: 2 INJECTION INTRAMUSCULAR; INTRAVENOUS at 17:43

## 2023-10-18 RX ADMIN — SODIUM CHLORIDE, PRESERVATIVE FREE 10 ML: 5 INJECTION INTRAVENOUS at 08:22

## 2023-10-18 RX ADMIN — POTASSIUM CHLORIDE 20 MEQ: 1500 TABLET, EXTENDED RELEASE ORAL at 13:17

## 2023-10-18 RX ADMIN — PIPERACILLIN AND TAZOBACTAM 3375 MG: 3; .375 INJECTION, POWDER, LYOPHILIZED, FOR SOLUTION INTRAVENOUS at 12:09

## 2023-10-18 RX ADMIN — PIPERACILLIN AND TAZOBACTAM 3375 MG: 3; .375 INJECTION, POWDER, LYOPHILIZED, FOR SOLUTION INTRAVENOUS at 04:00

## 2023-10-18 ASSESSMENT — PAIN SCALES - WONG BAKER
WONGBAKER_NUMERICALRESPONSE: 4

## 2023-10-18 ASSESSMENT — PAIN SCALES - GENERAL
PAINLEVEL_OUTOF10: 8
PAINLEVEL_OUTOF10: 0
PAINLEVEL_OUTOF10: 8

## 2023-10-18 ASSESSMENT — PAIN DESCRIPTION - DESCRIPTORS: DESCRIPTORS: ACHING

## 2023-10-18 ASSESSMENT — PAIN DESCRIPTION - LOCATION: LOCATION: GROIN

## 2023-10-18 NOTE — PROGRESS NOTES
Physician Progress Note      PATIENT:               Jian Butler  CSN #:                  [id number]  :                       1975  ADMIT DATE:       10/10/2023 1:10 PM  DISCH DATE:  RESPONDING  PROVIDER #: Usman Manrique MD          QUERY TEXT:    Patient admitted with bladder mass. Noted documentation of ECHO without   evidence of HF in 10/16 Attending PN and significant diastolic congestive   heart failure in 10/17 Attending note. If possible, please document in progress notes and discharge summary if you   are evaluating and /or treating any of the following: The medical record reflects the following:  Risk Factors: 50year old male, severe pulmonary edema, Respiratory failure,   elevated BNP  Clinical Indicators: 10/16 Attending PN - Unclear cause of pulmonary edema as   Echo without evidence of HF  10/17 Attending PN - Thus far we have drained net 10 L over the last 48 hours   with significant diastolic congestive heart failure. Severe pulmonary edema   secondary to diastolic heart failure  BNP: 293  Treatment: IV Lasix 40mg BID      Please email [email or web address] with any questions  Options provided:  -- Heart failure ruled out confirmed and Acute diastolic heart failure ruled   out  -- Acute diastolic heart failure confirmed and heart failure ruled out, ruled   out  -- Other - I will add my own diagnosis  -- Disagree - Not applicable / Not valid  -- Disagree - Clinically unable to determine / Unknown  -- Refer to Clinical Documentation Reviewer    PROVIDER RESPONSE TEXT:    After study, acute diastolic heart failure confirmed and heart failure ruled   out, ruled out. Query created by:  Lilia Ruiz on 10/18/2023 11:35 AM      Electronically signed by:  Marylou Morley MD 10/18/2023 11:39 AM

## 2023-10-18 NOTE — PROGRESS NOTES
Physician Progress Note      PATIENT:               Reed Aleman  CSN #:                  [id number]  :                       1975  ADMIT DATE:       10/10/2023 1:10 PM  DISCH DATE:  RESPONDING  PROVIDER #: Usman Osborn MD          QUERY TEXT:    Pt admitted with bladder mass. Pt noted to have pneumonia. If possible,   please document in the progress notes and discharge summary if you are   evaluating and/or treating any of the following:      Note: CAP and HCAP indicate where the pneumonia was acquired, not a specific   type. The medical record reflects the following:  Risk Factors: 50year old male, SIRS, respiratory failure, hemoptysis  Clinical Indicators: 10/13 Attending PN - RRT initiated on patient due to   hypoxia and tachypnea. Patient with hemoptysis. Patient initially placed on nonrebreather and escalated to BiPAP given   persistent hypoxia. Concern for post-operative obstruction after cystoscopy with TURBT yesterday,   possible aspiration. 10/13 CXR - Interval development of diffuse bilateral airspace disease, likely  representing pneumonia. Treatment: IV Zosyn, IV Vancomycin      Please email [email or web address] with any questions  Options provided:  -- Gram negative pneumonia  -- Viral pneumonia  -- Aspiration pneumonia  -- Other - I will add my own diagnosis  -- Disagree - Not applicable / Not valid  -- Disagree - Clinically unable to determine / Unknown  -- Refer to Clinical Documentation Reviewer    PROVIDER RESPONSE TEXT:    This patient has aspiration pneumonia. Query created by:  Lali Almodovar on 10/18/2023 11:22 AM      Electronically signed by:  Bonifacio Nevarez MD 10/18/2023 11:28 AM

## 2023-10-18 NOTE — CARE COORDINATION
Currently on 4L NC. Wean as appropriate. No home O2. Home O2 TBD. Home O2 TBD prior to discharge. PT/OT eval pending. Patient to follow up w/general surgery (Dr. Katz) after discharge re: umbilical hernia.       CANDI Alvarez

## 2023-10-18 NOTE — PROGRESS NOTES
expiration date 10/16/2023,2359     ABO/Rh A Positive     Antibody Screen Negative    Blood Gas, Arterial    Collection Time: 10/13/23  1:06 PM   Result Value Ref Range    pH, Arterial 7.39 7.35 - 7.45      pCO2, Arterial 32 (L) 35 - 45 mmHg    pO2, Arterial 57 (L) 80 - 100 mmHg    O2 Sat, Arterial 90 (L) 95 - 99 %    HCO3, Arterial 19 (L) 22 - 26 mmol/L    Base deficit, arterial blood 4.9 mmol/L    O2 Method Non-rebreathing mask      FIO2 Arterial 100 %    Source Arterial      Site Right Radial      Jere Test YES      Carboxyhgb, Arterial 0.8 (L) 1 - 2 %    Methemoglobin, Arterial 0.3 0 - 1.4 %    Oxyhemoglobin 88.9 (L) 95 - 99 %    Performed by: Catracho Richter     Temperature 98.6     COVID-19 & Influenza Combo    Collection Time: 10/13/23  4:30 PM    Specimen: Nasopharyngeal   Result Value Ref Range    SARS-CoV-2, PCR Not Detected Not Detected      Rapid Influenza A By PCR Not Detected Not Detected      Rapid Influenza B By PCR Not Detected Not Detected     Vancomycin Level, Random    Collection Time: 10/13/23  5:50 PM   Result Value Ref Range    Vancomycin Rm 16.7 ug/mL   Troponin    Collection Time: 10/13/23  5:50 PM   Result Value Ref Range    Troponin, High Sensitivity 72 0 - 76 ng/L   EKG 12 Lead    Collection Time: 10/13/23  6:02 PM   Result Value Ref Range    Ventricular Rate 105 BPM    Atrial Rate 105 BPM    P-R Interval 140 ms    QRS Duration 90 ms    Q-T Interval 332 ms    QTc Calculation (Bazett) 438 ms    P Axis 25 degrees    R Axis 23 degrees    T Axis 20 degrees    Diagnosis       Sinus tachycardia  Otherwise normal ECG  When compared with ECG of 17-MAY-2023 15:18,  No significant change was found  Confirmed by Sanam Silverio MD, [facility] ([id number]) on 10/14/2023 12:13:59 PM     CBC with Auto Differential    Collection Time: 10/14/23  2:00 AM   Result Value Ref Range    WBC 9.7 4.1 - 11.1 K/uL    RBC 4.04 (L) 4.10 - 5.70 M/uL    Hemoglobin 12.2 12.1 - 17.0 g/dL    Hematocrit 35.8 (L) 36.6 - 50.3 %    MCV 88.6 Simpsons Biplane is 69%. Left ventricle size is normal. LVIDd index is 1.69 cm/m2. Mild posterior thickening. Mass index 2D is 64.7 g/m2. Normal wall motion. Normal diastolic function. Aortic Valve: Mild sclerosis of the aortic valve cusp. Mitral Valve: Mildly thickened leaflet. Mild regurgitation with a centrally directed jet. Tricuspid Valve: Mild regurgitation with jet direction toward the septum. The estimated RVSP is 23 mmHg. Contrast used: Definity. 10/18/23 no new complaints  On nasal cannula this morning    MICROBIOLOGY        ASSESSMENT AND PLAN    Acute respiratory failure with hypoxia and hypocarbia  Severe pulmonary edema secondary to diastolic heart failure  CTA of the chest 10/13 with extensive multilobar airspace disease concerning for pneumonia, pulm edema hemorrhage. No evidence of PE in the main or lobar arteries but distal branches limited due to severe artifact. BNP elevated 293  Repeat chest x-ray with no significant change in the past day   Continue IV diuresis until respiratory function improves  Pulmonology following  Weaning oxygen as tolerated   Echo: EF of 65-70%, normal LV diastolic function, normal RV systolic fxn. Cardiology consult to evaluate etiology for pulmonary edema     SIRS, unclear source of infection, suspected right ankle cellulitis  Possible new underlying pneumonia  Elevated procal and CRP; however WBC wnl  Continue empiric IV Zosyn. Vancomycin discontinued. Blood cultures pending 10/10, no growth    Repeat blood cultures 10/14 given hypoxia and fever  Initial chest x-ray 10/10 with no acute pathology  COVID, influenza x 2 negative  ID consulted 10/16     Bladder mass, suspected malignancy  Urology consult, Dr. Chávez [facility].   Cystoscopy with TURBT performed 10/12. Highly suspicious for bladder cancer with large mass >15cm wrapping around bladder neck. Pending pathology.   Albrecht irrigated and exchanged by Urology on 10/13 as was not draining  Albrecht catheter in place  Continuing to have good urine output  PSA screening 0.4     Hypokalemia  Replete as needed with oral and IV supplementation. Urinary tract infection  Urine culture + staph aureus, 15,000 colonies/ml. Susceptible to vancomycin. Continue IV antibiotics  ID consult     SUSANA  Cr  bumped to >2 now slightly better  Continue to monitor  with diuretics  Renal consulted 10/16  Monitor and replete potassium daily     Right ankle cellulitis  Venous duplex ultrasound negative for DVT  CT of the right lower extremity 10/13 with mild edema in the anterior tibia consistent with cellulitis, no evidence of abscess. Continue IV antibiotics     Umbilical hernia  CT abdomen pelvis with mild inflammation of small fat-containing umbilical hernia  Consulted general surgery, Dr. Anne Justice, at patient is scheduled for repair on 10/25, which he can follow-up with as scheduled. Back pain   Suspect musculoskeletal cause or referred pain, no midline tenderness  CT abd/pelvis with no hydronephrosis, no CVA tenderness  Pain controlled with Toradol      Rectal bleeding   Patient reporting blood on toilet paper after having bowel movements  No melena  Fu stool occult  Suspect hemorrhoids, preparation H as needed.    Monitor hemoglobin closely  Drop in hemoglobin consider GI eval     Code Status: Full code     DVT Prophylaxis:   SCDs      Code status: Full Code    Social determinants of health: none    Estimated discharge date//time frame/disposition: 1-2 days    Barriers to discharge: clinical improvement    Total time spent with patient: 38 mins    Ronald Guzman MD

## 2023-10-18 NOTE — PROGRESS NOTES
CARDIOLOGY PROGRESS NOTE      Patient Name: Valarie Artis  Age: 50 y.o. Gender:male  :1975  MRN: [id number]    Patient seen and examined. This is a patient with a history of back pain, cellulitis,  abdominal pain who presented with abdominal and back pain now being followed for pulmonary edema. Resting in bed, weaned off HFNC. Feeling better today overall. No chest pain. No other complaints reported. Telemetry reviewed, there were no events noted in the past 24 hours. Pertinent review of systems items noted above, all other systems are negative. Current medications reviewed. Physical Examination    No Known Allergies  Vitals:    10/18/23 1423   BP:    Pulse:    Resp: 23   Temp:    SpO2:      Vital signs are stable  No apparent distress  Heart has a regular rate and rhythm. no murmur  Lungs are coarse  Abdomen is soft, nontender, normal bowel sounds. Extremities have mild edema  Skin is dry and warm.   Normal affect    Labs reviewed:  Recent Results (from the past 12 hour(s))   POC Blood Gas & Ionized Calcium    Collection Time: 10/18/23  3:34 AM   Result Value Ref Range    pH, Arterial 7.50 (H) 7.35 - 7.45      pCO2, Arterial 41 35 - 45 mmHg    pO2, Arterial 63 (L) 80 - 100 mmHg    HCO3, Arterial 31 (H) 22 - 26 mmol/L    Base Excess, Arterial 6.9 (H) 0 - 3 mmol/L    O2 Sat, Arterial 92 (L) 95 - 99 %    Calcium, Ionized 1.22 1.13 - 1.32 mmol/L    O2 Method High Flow Nasal Cannula      FIO2 Arterial 35 %    Source Arterial      Site Right Radial      Jere Test YES      Carboxyhgb, Arterial 0.5 (L) 1 - 2 %    Methemoglobin, Arterial 0.2 0 - 1.4 %    Oxyhemoglobin 91.7 (L) 95 - 99 %    Performed by: Epi Winkler     Temperature 98.0          Case discussed with Dr. Elaine Little and our impression and recommendations are as follows:  Respiratory failure  Remains on HFNC, pulm treating pneumonia vs ARDS  Echo with normal EF, no obvious diastolic dysfunction or significant valve issues  Continue diuresis

## 2023-10-19 ENCOUNTER — APPOINTMENT (OUTPATIENT)
Facility: HOSPITAL | Age: 48
DRG: 446 | End: 2023-10-19
Payer: MEDICAID

## 2023-10-19 LAB
ALBUMIN SERPL-MCNC: 2.8 G/DL (ref 3.5–5)
ANION GAP SERPL CALC-SCNC: 6 MMOL/L (ref 5–15)
ARTERIAL PATENCY WRIST A: YES
BASE EXCESS BLDA CALC-SCNC: 3.9 MMOL/L (ref 0–3)
BASOPHILS # BLD: 0 K/UL (ref 0–0.1)
BASOPHILS NFR BLD: 0 % (ref 0–1)
BDY SITE: ABNORMAL
BODY TEMPERATURE: 97.5
BUN SERPL-MCNC: 37 MG/DL (ref 6–20)
BUN/CREAT SERPL: 20 (ref 12–20)
CA-I BLD-MCNC: 9.6 MG/DL (ref 8.5–10.1)
CHLORIDE SERPL-SCNC: 101 MMOL/L (ref 97–108)
CO2 SERPL-SCNC: 26 MMOL/L (ref 21–32)
COHGB MFR BLD: 0.1 % (ref 1–2)
CREAT SERPL-MCNC: 1.86 MG/DL (ref 0.7–1.3)
CRP SERPL-MCNC: 4.31 MG/DL (ref 0–0.6)
DIFFERENTIAL METHOD BLD: ABNORMAL
EOSINOPHIL # BLD: 0 K/UL (ref 0–0.4)
EOSINOPHIL NFR BLD: 0 % (ref 0–7)
ERYTHROCYTE [DISTWIDTH] IN BLOOD BY AUTOMATED COUNT: 14.3 % (ref 11.5–14.5)
FIO2 ON VENT: 28 %
GAS FLOW.O2 O2 DELIVERY SYS: 2 L/MIN
GLUCOSE SERPL-MCNC: 155 MG/DL (ref 65–100)
HCO3 BLDA-SCNC: 28 MMOL/L (ref 22–26)
HCT VFR BLD AUTO: 34.6 % (ref 36.6–50.3)
HGB BLD-MCNC: 11.4 G/DL (ref 12.1–17)
IMM GRANULOCYTES # BLD AUTO: 0 K/UL
IMM GRANULOCYTES NFR BLD AUTO: 0 %
LYMPHOCYTES # BLD: 1.3 K/UL (ref 0.8–3.5)
LYMPHOCYTES NFR BLD: 7 % (ref 12–49)
MCH RBC QN AUTO: 29.6 PG (ref 26–34)
MCHC RBC AUTO-ENTMCNC: 32.9 G/DL (ref 30–36.5)
MCV RBC AUTO: 89.9 FL (ref 80–99)
METHGB MFR BLD: 0.3 % (ref 0–1.4)
MONOCYTES # BLD: 0.8 K/UL (ref 0–1)
MONOCYTES NFR BLD: 4 % (ref 5–13)
MYELOCYTES NFR BLD MANUAL: 3 %
NEUTS SEG # BLD: 16.5 K/UL (ref 1.8–8)
NEUTS SEG NFR BLD: 86 % (ref 32–75)
NRBC # BLD: 0 K/UL (ref 0–0.01)
NRBC BLD-RTO: 0 PER 100 WBC
OXYHGB MFR BLD: 95.2 % (ref 95–99)
PCO2 BLDA: 40 MMHG (ref 35–45)
PERFORMED BY:: ABNORMAL
PH BLDA: 7.46 (ref 7.35–7.45)
PHOSPHATE SERPL-MCNC: 3.7 MG/DL (ref 2.6–4.7)
PLATELET # BLD AUTO: 384 K/UL (ref 150–400)
PLATELET COMMENT: ABNORMAL
PMV BLD AUTO: 9.6 FL (ref 8.9–12.9)
PO2 BLDA: 79 MMHG (ref 80–100)
POTASSIUM SERPL-SCNC: 4 MMOL/L (ref 3.5–5.1)
PROCALCITONIN SERPL-MCNC: 0.07 NG/ML
RBC # BLD AUTO: 3.85 M/UL (ref 4.1–5.7)
RBC MORPH BLD: ABNORMAL
SAO2 % BLD: 96 % (ref 95–99)
SAO2% DEVICE SAO2% SENSOR NAME: ABNORMAL
SODIUM SERPL-SCNC: 133 MMOL/L (ref 136–145)
SPECIMEN SITE: ABNORMAL
WBC # BLD AUTO: 19.2 K/UL (ref 4.1–11.1)

## 2023-10-19 PROCEDURE — 2700000000 HC OXYGEN THERAPY PER DAY

## 2023-10-19 PROCEDURE — 6370000000 HC RX 637 (ALT 250 FOR IP): Performed by: STUDENT IN AN ORGANIZED HEALTH CARE EDUCATION/TRAINING PROGRAM

## 2023-10-19 PROCEDURE — 2580000003 HC RX 258: Performed by: INTERNAL MEDICINE

## 2023-10-19 PROCEDURE — 6360000002 HC RX W HCPCS: Performed by: PHYSICIAN ASSISTANT

## 2023-10-19 PROCEDURE — 6360000002 HC RX W HCPCS: Performed by: INTERNAL MEDICINE

## 2023-10-19 PROCEDURE — 36600 WITHDRAWAL OF ARTERIAL BLOOD: CPT

## 2023-10-19 PROCEDURE — 2060000000 HC ICU INTERMEDIATE R&B

## 2023-10-19 PROCEDURE — 82803 BLOOD GASES ANY COMBINATION: CPT

## 2023-10-19 PROCEDURE — 85025 COMPLETE CBC W/AUTO DIFF WBC: CPT

## 2023-10-19 PROCEDURE — 6370000000 HC RX 637 (ALT 250 FOR IP): Performed by: INTERNAL MEDICINE

## 2023-10-19 PROCEDURE — 84145 PROCALCITONIN (PCT): CPT

## 2023-10-19 PROCEDURE — 6370000000 HC RX 637 (ALT 250 FOR IP): Performed by: UROLOGY

## 2023-10-19 PROCEDURE — 71045 X-RAY EXAM CHEST 1 VIEW: CPT

## 2023-10-19 PROCEDURE — 94761 N-INVAS EAR/PLS OXIMETRY MLT: CPT

## 2023-10-19 PROCEDURE — 99233 SBSQ HOSP IP/OBS HIGH 50: CPT | Performed by: INTERNAL MEDICINE

## 2023-10-19 PROCEDURE — 6370000000 HC RX 637 (ALT 250 FOR IP): Performed by: EMERGENCY MEDICINE

## 2023-10-19 PROCEDURE — 80069 RENAL FUNCTION PANEL: CPT

## 2023-10-19 PROCEDURE — 6360000002 HC RX W HCPCS: Performed by: STUDENT IN AN ORGANIZED HEALTH CARE EDUCATION/TRAINING PROGRAM

## 2023-10-19 PROCEDURE — 36415 COLL VENOUS BLD VENIPUNCTURE: CPT

## 2023-10-19 PROCEDURE — 86140 C-REACTIVE PROTEIN: CPT

## 2023-10-19 PROCEDURE — 2580000003 HC RX 258: Performed by: STUDENT IN AN ORGANIZED HEALTH CARE EDUCATION/TRAINING PROGRAM

## 2023-10-19 RX ADMIN — OXYCODONE AND ACETAMINOPHEN 1 TABLET: 5; 325 TABLET ORAL at 04:19

## 2023-10-19 RX ADMIN — METHYLPREDNISOLONE SODIUM SUCCINATE 40 MG: 40 INJECTION, POWDER, FOR SOLUTION INTRAMUSCULAR; INTRAVENOUS at 10:00

## 2023-10-19 RX ADMIN — Medication 95 MG: at 12:51

## 2023-10-19 RX ADMIN — PIPERACILLIN AND TAZOBACTAM 3375 MG: 3; .375 INJECTION, POWDER, LYOPHILIZED, FOR SOLUTION INTRAVENOUS at 04:26

## 2023-10-19 RX ADMIN — SPIRONOLACTONE 25 MG: 25 TABLET ORAL at 10:01

## 2023-10-19 RX ADMIN — FUROSEMIDE 40 MG: 10 INJECTION, SOLUTION INTRAMUSCULAR; INTRAVENOUS at 10:01

## 2023-10-19 RX ADMIN — Medication 95 MG: at 10:01

## 2023-10-19 RX ADMIN — FUROSEMIDE 40 MG: 10 INJECTION, SOLUTION INTRAMUSCULAR; INTRAVENOUS at 17:05

## 2023-10-19 RX ADMIN — TAMSULOSIN HYDROCHLORIDE 0.4 MG: 0.4 CAPSULE ORAL at 10:01

## 2023-10-19 RX ADMIN — SODIUM CHLORIDE, PRESERVATIVE FREE 10 ML: 5 INJECTION INTRAVENOUS at 21:03

## 2023-10-19 RX ADMIN — ACETAMINOPHEN 650 MG: 325 TABLET ORAL at 10:01

## 2023-10-19 RX ADMIN — METHYLPREDNISOLONE SODIUM SUCCINATE 40 MG: 40 INJECTION, POWDER, FOR SOLUTION INTRAMUSCULAR; INTRAVENOUS at 17:05

## 2023-10-19 RX ADMIN — PIPERACILLIN AND TAZOBACTAM 3375 MG: 3; .375 INJECTION, POWDER, LYOPHILIZED, FOR SOLUTION INTRAVENOUS at 21:01

## 2023-10-19 RX ADMIN — PIPERACILLIN AND TAZOBACTAM 3375 MG: 3; .375 INJECTION, POWDER, LYOPHILIZED, FOR SOLUTION INTRAVENOUS at 12:51

## 2023-10-19 RX ADMIN — Medication 95 MG: at 17:06

## 2023-10-19 RX ADMIN — METHYLPREDNISOLONE SODIUM SUCCINATE 40 MG: 40 INJECTION, POWDER, FOR SOLUTION INTRAMUSCULAR; INTRAVENOUS at 00:09

## 2023-10-19 RX ADMIN — LORAZEPAM 0.5 MG: 2 INJECTION INTRAMUSCULAR; INTRAVENOUS at 21:03

## 2023-10-19 RX ADMIN — LORAZEPAM 0.5 MG: 2 INJECTION INTRAMUSCULAR; INTRAVENOUS at 03:14

## 2023-10-19 RX ADMIN — SODIUM CHLORIDE, PRESERVATIVE FREE 10 ML: 5 INJECTION INTRAVENOUS at 10:02

## 2023-10-19 ASSESSMENT — PAIN SCALES - GENERAL
PAINLEVEL_OUTOF10: 7
PAINLEVEL_OUTOF10: 0
PAINLEVEL_OUTOF10: 0
PAINLEVEL_OUTOF10: 7

## 2023-10-19 ASSESSMENT — PAIN DESCRIPTION - LOCATION
LOCATION: PENIS
LOCATION: PENIS

## 2023-10-19 ASSESSMENT — PAIN DESCRIPTION - DESCRIPTORS: DESCRIPTORS: THROBBING;OTHER (COMMENT)

## 2023-10-19 ASSESSMENT — PAIN SCALES - WONG BAKER: WONGBAKER_NUMERICALRESPONSE: 0

## 2023-10-19 NOTE — PROGRESS NOTES
IMPRESSION:   Acute hypoxic respiratory failure  Acute pulmonary edema rule out negative pressure pulmonary edema  Bilateral pulmonary infiltrate rule out ARDS  Status post bladder tumor TURBT  Acute kidney injury  Hypokalemia  Urinary tract infection  Rule out sepsis with cellulitis right lower extremity  Urinary tract infection with Staph aureus  Pt is at high risk of sudden decline and decompensation with life threatening consequenses and continued end organ dysfunction and failure  Pt is critically ill.  Time spent with pt and staff actively rendering care, managing pt and coordinating care as stated below; 30 minutes, exclusive of any procedures      RECOMMENDATIONS/PLAN:   ICU monitoring  41-year-old male came in with mild GI back pain redness of the lower extremities found to have a bladder tumor underwent TURBT now he is hypoxic in ICU on 100% noninvasive ventilator  BIPAP for non invasive ventilatory life support to avoid worsening respiratory acidosis, hypercacarbic or hypoxic respiratory arrest  CAT scan of the chest was done which shows fluid overload groundglass opacity possible negative pressure pulmonary edema possible infiltrate rule out ARDS or acute lung injury  Chest x-ray has not changed still shows bilateral infiltrate echo shows normal ejection fraction no systolic or diastolic dysfunction  Continue with high-dose Lasix monitor BUN and creatinine  Arterial blood gas much improved now on 35% FiO2 on high flow PCO2 41 PO2 63 we will continue to wean  UTI with Staph aureus  We will start patient on high-dose steroid  2D echo shows ejection fraction 65 to 70% RVSP of 23  Worsening of the renal function change Lasix to 40 twice daily  Intubate and place on vent if NIV fails  Sleep study as an outpatient he needs CPAP machine  Agree with Empiric IV antibiotics pending culture results   Follow culture results  CVP monitoring  IV vasopressors for circulatory shock refractory to fluids to maintain SBP> Cardiac: IR regular; no murmur;   Lungs: distant breath sounds; diminished breath sound bilaterally no wheeze  Abd: soft, NT, hypoactive BS  Ext: Right lower extremity erythema no joint swelling; No clubbing  : NO walker, clear urine  Neuro: No focal neurological deficit  Psych- no agitation, oriented to person;   Skin: warm, dry, no cyanosis;   Pulses: 1-2+ Bilateral pedal, radial  Capillary: brisk; pale      DATA:  No results found for this or any previous visit. No results found for this or any previous visit.        MAR reviewed and pertinent medications noted or modified as needed    MEDS:   Current Facility-Administered Medications   Medication Dose Route Frequency Provider Last Rate Last Admin    diclofenac sodium (VOLTAREN) 1 % gel 2 g  2 g Topical 4x Daily PRN Andres Bowling MD   2 g at 10/17/23 0503    methylPREDNISolone sodium (PF) (SOLU-MEDROL PF) injection 40 mg  40 mg IntraVENous Q8H Elvira Monsalve MD   40 mg at 10/19/23 0009    piperacillin-tazobactam (ZOSYN) 3,375 mg in sodium chloride 0.9 % 50 mL IVPB (mini-bag)  3,375 mg IntraVENous Q8H Claudia CARPIO MD 12.5 mL/hr at 10/19/23 0426 3,375 mg at 10/19/23 0426    spironolactone (ALDACTONE) tablet 25 mg  25 mg Oral Daily Manoj Cross MD   25 mg at 10/18/23 0820    ipratropium 0.5 mg-albuterol 2.5 mg (DUONEB) nebulizer solution 1 Dose  1 Dose Inhalation Q4H PRN Elvira Monsalve MD   1 Dose at 10/16/23 0754    diphenhydrAMINE (BENADRYL) capsule 25 mg  25 mg Oral Q6H PRN Narinder LavStephani chaudhary PA-C        furosemide (LASIX) injection 40 mg  40 mg IntraVENous BID Stephani Parson PA-C   40 mg at 10/18/23 1744    melatonin tablet 5 mg  5 mg Oral Nightly PRN Ramos Gonzalez MD   5 mg at 10/18/23 2313    LORazepam (ATIVAN) injection 0.5 mg  0.5 mg IntraVENous Q6H PRN Krish Delaney PA-C   0.5 mg at 10/19/23 0314    hydrocortisone 1 % cream   Topical BID Lorena Banks PA-C   Given at 10/18/23 2014    phenazopyridine (PYRIDIUM) isovue 370 according to departmental PE protocol. Coronal and sagittal reformats were performed. 3D post processing was performed. CT dose reduction was achieved through the use of a standardized protocol tailored for this examination and automatic exposure control for dose modulation. FINDINGS: Evaluation of distal branches is limited by extensive motion artifact. No filling defects in the main or lobar arteries. THYROID: No nodule. MEDIASTINUM: No mass or lymphadenopathy. ALFREDO: No mass or lymphadenopathy. THORACIC AORTA: No aneurysm. HEART: Normal in size. ESOPHAGUS: No wall thickening or dilatation. TRACHEA/BRONCHI: Patent. PLEURA: No effusion or pneumothorax. LUNGS: Extensive patchy multilobar groundglass opacification. UPPER ABDOMEN: Partially imaged. No acute pathology. BONES: No aggressive bone lesion or fracture. 1. Extensive multilobar airspace disease. Differential includes pneumonia, pulmonary edema, alveolar hemorrhage. 2. Evaluation of distal pulmonary arterial branches is limited by severe motion artifact. No evidence of pulmonary embolism in the main or lobar arteries.       10/14 alert awake on high flow nasal cannula still severely hypoxic chest x-ray still shows bilateral congestive changes worsening of the renal function supplement potassium decrease Lasix dose, asking for pain medication  10/16 patient is complaining about burning in the leg patient wears noninvasive ventilator BiPAP at night now on high flow nasal cannula chest x-ray still showed persistent infiltrate we will get procalcitonin and CRP  10/17 patient still on high flow nasal cannula short of breath dyspneic 2D echo normal chest x-ray did not show significant difference after diuresis we will start patient on high-dose steroids possibility of ARDS/fibrosis  10/18 on high flow 30% FiO2 continue to wean on Solu-Medrol and antibiotics

## 2023-10-19 NOTE — PROGRESS NOTES
1819: Verbal report received from Cameron RN to Maria Isabel Burrows RN.     7029: Bedside and verbal shift report completed by Maria Isabel Burrows RN to [place], [place]. Pt not on 2E yet. Pt is still on 4W.

## 2023-10-19 NOTE — PROGRESS NOTES
multilobar airspace disease concerning for pneumonia, pulm edema, alveolar hemorrhage. No evidence of PE in the main or lobar arteries but distal branches limited due to severe artifact. Echo with normal EF of 65-7%. Repeat blood cultures pending. Continue IV vancomycin and Zosyn at this time. Continuing IV diuresis until respiratory function improves. Unclear cause of pulmonary edema as Echo without evidence of HF, may be related to obstructed walker catheter post-op. Has continued to have good urine output since walker cath exchanged 10/13.    10/15/23 echocardiogram    Left Ventricle: Normal left ventricular systolic function with a visually estimated EF of 65 - 70%. EF by 2D Simpsons Biplane is 69%. Left ventricle size is normal. LVIDd index is 1.69 cm/m2. Mild posterior thickening. Mass index 2D is 64.7 g/m2. Normal wall motion. Normal diastolic function. Aortic Valve: Mild sclerosis of the aortic valve cusp. Mitral Valve: Mildly thickened leaflet. Mild regurgitation with a centrally directed jet. Tricuspid Valve: Mild regurgitation with jet direction toward the septum. The estimated RVSP is 23 mmHg. Contrast used: Definity.     10/19/23 no new complaints  Weaning from oxygen as tolerated      Medications reviewed  Current Facility-Administered Medications   Medication Dose Route Frequency    diclofenac sodium (VOLTAREN) 1 % gel 2 g  2 g Topical 4x Daily PRN    methylPREDNISolone sodium (PF) (SOLU-MEDROL PF) injection 40 mg  40 mg IntraVENous Q8H    piperacillin-tazobactam (ZOSYN) 3,375 mg in sodium chloride 0.9 % 50 mL IVPB (mini-bag)  3,375 mg IntraVENous Q8H    spironolactone (ALDACTONE) tablet 25 mg  25 mg Oral Daily    ipratropium 0.5 mg-albuterol 2.5 mg (DUONEB) nebulizer solution 1 Dose  1 Dose Inhalation Q4H PRN    diphenhydrAMINE (BENADRYL) capsule 25 mg  25 mg Oral Q6H PRN    furosemide (LASIX) injection 40 mg  40 mg IntraVENous BID    melatonin tablet 5 mg  5 mg Oral Nightly PRN RBCs 0.0 0.0  WBC    nRBC 0.00 0.00 - 0.01 K/uL    Neutrophils % 81 (H) 32 - 75 %    Lymphocytes % 10 (L) 12 - 49 %    Monocytes % 8 5 - 13 %    Eosinophils % 0 0 - 7 %    Basophils % 0 0 - 1 %    Immature Granulocytes 1 (H) 0 - 0.5 %    Neutrophils Absolute 7.9 1.8 - 8.0 K/UL    Lymphocytes Absolute 1.0 0.8 - 3.5 K/UL    Monocytes Absolute 0.8 0.0 - 1.0 K/UL    Eosinophils Absolute 0.0 0.0 - 0.4 K/UL    Basophils Absolute 0.0 0.0 - 0.1 K/UL    Absolute Immature Granulocyte 0.1 (H) 0.00 - 0.04 K/UL    Differential Type AUTOMATED     Comprehensive Metabolic Panel w/ Reflex to MG    Collection Time: 10/14/23  2:00 AM   Result Value Ref Range    Sodium 136 136 - 145 mmol/L    Potassium 3.4 (L) 3.5 - 5.1 mmol/L    Chloride 107 97 - 108 mmol/L    CO2 24 21 - 32 mmol/L    Anion Gap 5 5 - 15 mmol/L    Glucose 149 (H) 65 - 100 mg/dL    BUN 26 (H) 6 - 20 mg/dL    Creatinine 2.49 (H) 0.70 - 1.30 mg/dL    Bun/Cre Ratio 10 (L) 12 - 20      Est, Glom Filt Rate 31 (L) >60 ml/min/1.73m2    Calcium 8.4 (L) 8.5 - 10.1 mg/dL    Total Bilirubin 1.3 (H) 0.2 - 1.0 mg/dL    AST 41 (H) 15 - 37 U/L    ALT 73 12 - 78 U/L    Alk Phosphatase 94 45 - 117 U/L    Total Protein 7.5 6.4 - 8.2 g/dL    Albumin 2.6 (L) 3.5 - 5.0 g/dL    Globulin 4.9 (H) 2.0 - 4.0 g/dL    Albumin/Globulin Ratio 0.5 (L) 1.1 - 2.2     Hemoglobin A1C    Collection Time: 10/14/23  2:00 AM   Result Value Ref Range    Hemoglobin A1C 5.3 4.0 - 5.6 %    eAG 105 mg/dL   Procalcitonin    Collection Time: 10/14/23  2:00 AM   Result Value Ref Range    Procalcitonin 3.11 (H) 0 ng/mL   Brain Natriuretic Peptide    Collection Time: 10/14/23  2:00 AM   Result Value Ref Range    NT Pro- (H) <125 pg/mL   C-Reactive Protein    Collection Time: 10/14/23  2:00 AM   Result Value Ref Range    CRP 36.30 (H) 0.00 - 0.60 mg/dL   Blood Gas, Arterial    Collection Time: 10/14/23  2:05 AM   Result Value Ref Range    pH, Arterial 7.42 7.35 - 7.45      pCO2, Arterial 34 (L) 35 - 45 consulted 10/16     Bladder mass, suspected malignancy  He is s/p TURBT from 10/12/2023  Pathology results:Papillary urothelial carcinoma, noninvasive, Low grade  Plan to repeat TURBT  with mitomycin installation in 6 weeks. Patient will need to follow up with DR Wai Rodriguez in 2 weeks after discharge to schedule a procedure     Hypokalemia  Replete as needed with oral and IV supplementation. Urinary tract infection  Urine culture + staph aureus, 15,000 colonies/ml. Susceptible to vancomycin. Continue IV antibiotics  ID consult     SUSANA  Cr  bumped to >2 now slightly better  Continue to monitor  with diuretics  Renal consulted 10/16  Monitor and replete potassium daily     Right ankle cellulitis  Venous duplex ultrasound negative for DVT  CT of the right lower extremity 10/13 with mild edema in the anterior tibia consistent with cellulitis, no evidence of abscess. Continue IV antibiotics     Umbilical hernia  CT abdomen pelvis with mild inflammation of small fat-containing umbilical hernia  Consulted general surgery, Dr. Carlyle Sears, at patient is scheduled for repair on 10/25, which he can follow-up with as scheduled. Back pain   Suspect musculoskeletal cause or referred pain, no midline tenderness  CT abd/pelvis with no hydronephrosis, no CVA tenderness  Pain controlled with Toradol      Rectal bleeding   Patient reporting blood on toilet paper after having bowel movements  No melena  Fu stool occult  Suspect hemorrhoids, preparation H as needed.    Monitor hemoglobin closely  Drop in hemoglobin consider GI eval     Code Status: Full code     DVT Prophylaxis:   SCDs      Code status: Full Code    Social determinants of health: none    Estimated discharge date//time frame/disposition: 1-2 days    Barriers to discharge: clinical improvement    Total time spent with patient: 38 mins    Allen Merino MD

## 2023-10-19 NOTE — PROGRESS NOTES
Result      1. Extensive multilobar airspace disease. Differential includes pneumonia,   pulmonary edema, alveolar hemorrhage. 2. Evaluation of distal pulmonary arterial branches is limited by severe motion   artifact. No evidence of pulmonary embolism in the main or lobar arteries. CT LOWER EXTREMITY WO CONTRAST   Final Result   CT findings are compatible with the clinical diagnosis of cellulitis. No   evidence of abscess. No soft tissue gas. No fracture or osteomyelitis. XR CHEST PORTABLE   Final Result   Interval development of diffuse bilateral airspace disease, likely   representing pneumonia. XR CHEST PORTABLE   Final Result      No acute process. CT ABDOMEN PELVIS W IV CONTRAST Additional Contrast? Oral   Final Result   1. Small fat-containing umbilical hernia with mild inflammation. 2. Urinary bladder mass versus protrusion of an enlarged prostate gland. Urologic consultation is recommended. Vascular duplex lower extremity venous right   Final Result          XR CHEST PORTABLE    Result Date: 10/13/2023  INDICATION:  SOB COMPARISON: 10/10/2023 FINDINGS: Single AP portable view of the chest obtained at 1249 demonstrates a stable cardiomediastinal silhouette. There is chronic cardiomegaly. There is interval development of diffuse bilateral airspace disease. Interval development of diffuse bilateral airspace disease, likely representing pneumonia. CT LOWER EXTREMITY WO CONTRAST    Result Date: 10/13/2023  EXAM: CT LOWER EXTREMITY WO CONTRAST INDICATION: Right lower extremity pain and clinical diagnosis of cellulitis. COMPARISON: None TECHNIQUE: Helical CT of the right lower extremity from the distal femur to the talus with coronal and sagittal reformats. Images reviewed in soft tissue and bone windows. CT dose reduction was achieved through the use of a standardized protocol tailored for this examination and automatic exposure control for dose modulation. CONTRAST: None. FINDINGS: Bones: Normal bone mineralization. No fracture, dislocation, or periosteal reaction. Joint fluid: None. Articulations: No significant osteoarthritis. No evidence of inflammatory arthritis. Tendons: No full-thickness tendon tear. Muscles: Mild diffuse atrophy. Soft tissue mass: No measurable mass. Fluid collection: No drainable fluid collection. Miscellaneous: Mild edema in the subcutaneous adipose tissues is predominantly anterior to the tibia. Skin thickening is predominantly anterior and medial in the distal leg. CT findings are compatible with the clinical diagnosis of cellulitis. No evidence of abscess. No soft tissue gas. No fracture or osteomyelitis. CTA CHEST W WO CONTRAST    Result Date: 10/13/2023  EXAM:  CTA CHEST W WO CONTRAST INDICATION: Hemoptysis COMPARISON: None. TECHNIQUE: Helical thin section chest CT following uneventful intravenous administration of nonionic contrast 100 mL of isovue 370 according to departmental PE protocol. Coronal and sagittal reformats were performed. 3D post processing was performed. CT dose reduction was achieved through the use of a standardized protocol tailored for this examination and automatic exposure control for dose modulation. FINDINGS: Evaluation of distal branches is limited by extensive motion artifact. No filling defects in the main or lobar arteries. THYROID: No nodule. MEDIASTINUM: No mass or lymphadenopathy. ALFREDO: No mass or lymphadenopathy. THORACIC AORTA: No aneurysm. HEART: Normal in size. ESOPHAGUS: No wall thickening or dilatation. TRACHEA/BRONCHI: Patent. PLEURA: No effusion or pneumothorax. LUNGS: Extensive patchy multilobar groundglass opacification. UPPER ABDOMEN: Partially imaged. No acute pathology. BONES: No aggressive bone lesion or fracture. 1. Extensive multilobar airspace disease. Differential includes pneumonia, pulmonary edema, alveolar hemorrhage.  2. Evaluation of distal pulmonary arterial branches is limited by severe motion artifact. No evidence of pulmonary embolism in the main or lobar arteries.       10/14 alert awake on high flow nasal cannula still severely hypoxic chest x-ray still shows bilateral congestive changes worsening of the renal function supplement potassium decrease Lasix dose, asking for pain medication  10/16 patient is complaining about burning in the leg patient wears noninvasive ventilator BiPAP at night now on high flow nasal cannula chest x-ray still showed persistent infiltrate we will get procalcitonin and CRP  10/17 patient still on high flow nasal cannula short of breath dyspneic 2D echo normal chest x-ray did not show significant difference after diuresis we will start patient on high-dose steroids possibility of ARDS/fibrosis  10/18 on high flow 30% FiO2 continue to wean on Solu-Medrol and antibiotics MSSA UTI chest x-ray improving  10/18 now he is on regular nasal cannula continue with the Solu-Medrol Lasix and also on Zosyn

## 2023-10-20 ENCOUNTER — TELEPHONE (OUTPATIENT)
Age: 48
End: 2023-10-20

## 2023-10-20 ENCOUNTER — APPOINTMENT (OUTPATIENT)
Facility: HOSPITAL | Age: 48
DRG: 446 | End: 2023-10-20
Payer: MEDICAID

## 2023-10-20 VITALS
HEART RATE: 88 BPM | TEMPERATURE: 98.2 F | WEIGHT: 305.34 LBS | SYSTOLIC BLOOD PRESSURE: 128 MMHG | DIASTOLIC BLOOD PRESSURE: 67 MMHG | RESPIRATION RATE: 18 BRPM | BODY MASS INDEX: 39.19 KG/M2 | HEIGHT: 74 IN | OXYGEN SATURATION: 91 %

## 2023-10-20 LAB
ALBUMIN SERPL-MCNC: 2.8 G/DL (ref 3.5–5)
ANION GAP SERPL CALC-SCNC: 7 MMOL/L (ref 5–15)
BASOPHILS # BLD: 0 K/UL (ref 0–0.1)
BASOPHILS NFR BLD: 0 % (ref 0–1)
BUN SERPL-MCNC: 39 MG/DL (ref 6–20)
BUN/CREAT SERPL: 22 (ref 12–20)
CA-I BLD-MCNC: 9.4 MG/DL (ref 8.5–10.1)
CHLORIDE SERPL-SCNC: 101 MMOL/L (ref 97–108)
CO2 SERPL-SCNC: 26 MMOL/L (ref 21–32)
CREAT SERPL-MCNC: 1.76 MG/DL (ref 0.7–1.3)
CRP SERPL-MCNC: 2.05 MG/DL (ref 0–0.6)
DIFFERENTIAL METHOD BLD: ABNORMAL
EOSINOPHIL # BLD: 0 K/UL (ref 0–0.4)
EOSINOPHIL NFR BLD: 0 % (ref 0–7)
ERYTHROCYTE [DISTWIDTH] IN BLOOD BY AUTOMATED COUNT: 14.2 % (ref 11.5–14.5)
GLUCOSE SERPL-MCNC: 163 MG/DL (ref 65–100)
HCT VFR BLD AUTO: 35.2 % (ref 36.6–50.3)
HGB BLD-MCNC: 11.7 G/DL (ref 12.1–17)
IMM GRANULOCYTES # BLD AUTO: 0 K/UL
IMM GRANULOCYTES NFR BLD AUTO: 0 %
L PNEUMO1 AG UR QL IA: NEGATIVE
LYMPHOCYTES # BLD: 1.7 K/UL (ref 0.8–3.5)
LYMPHOCYTES NFR BLD: 10 % (ref 12–49)
MCH RBC QN AUTO: 29.9 PG (ref 26–34)
MCHC RBC AUTO-ENTMCNC: 33.2 G/DL (ref 30–36.5)
MCV RBC AUTO: 90 FL (ref 80–99)
MONOCYTES # BLD: 0.9 K/UL (ref 0–1)
MONOCYTES NFR BLD: 5 % (ref 5–13)
MYELOCYTES NFR BLD MANUAL: 2 %
NEUTS SEG # BLD: 14.2 K/UL (ref 1.8–8)
NEUTS SEG NFR BLD: 83 % (ref 32–75)
NRBC # BLD: 0 K/UL (ref 0–0.01)
NRBC BLD-RTO: 0 PER 100 WBC
PHOSPHATE SERPL-MCNC: 3.6 MG/DL (ref 2.6–4.7)
PLATELET # BLD AUTO: 413 K/UL (ref 150–400)
PLATELET COMMENT: ABNORMAL
PMV BLD AUTO: 9.6 FL (ref 8.9–12.9)
POTASSIUM SERPL-SCNC: 4.2 MMOL/L (ref 3.5–5.1)
PROCALCITONIN SERPL-MCNC: 0.05 NG/ML
RBC # BLD AUTO: 3.91 M/UL (ref 4.1–5.7)
RBC MORPH BLD: ABNORMAL
SODIUM SERPL-SCNC: 134 MMOL/L (ref 136–145)
SPECIMEN SOURCE: NORMAL
WBC # BLD AUTO: 17.1 K/UL (ref 4.1–11.1)

## 2023-10-20 PROCEDURE — 71045 X-RAY EXAM CHEST 1 VIEW: CPT

## 2023-10-20 PROCEDURE — 36415 COLL VENOUS BLD VENIPUNCTURE: CPT

## 2023-10-20 PROCEDURE — 6370000000 HC RX 637 (ALT 250 FOR IP): Performed by: UROLOGY

## 2023-10-20 PROCEDURE — 85025 COMPLETE CBC W/AUTO DIFF WBC: CPT

## 2023-10-20 PROCEDURE — 6370000000 HC RX 637 (ALT 250 FOR IP): Performed by: INTERNAL MEDICINE

## 2023-10-20 PROCEDURE — 6370000000 HC RX 637 (ALT 250 FOR IP): Performed by: EMERGENCY MEDICINE

## 2023-10-20 PROCEDURE — 6360000002 HC RX W HCPCS: Performed by: STUDENT IN AN ORGANIZED HEALTH CARE EDUCATION/TRAINING PROGRAM

## 2023-10-20 PROCEDURE — 84145 PROCALCITONIN (PCT): CPT

## 2023-10-20 PROCEDURE — 86140 C-REACTIVE PROTEIN: CPT

## 2023-10-20 PROCEDURE — 2580000003 HC RX 258: Performed by: INTERNAL MEDICINE

## 2023-10-20 PROCEDURE — 6360000002 HC RX W HCPCS: Performed by: INTERNAL MEDICINE

## 2023-10-20 PROCEDURE — 80069 RENAL FUNCTION PANEL: CPT

## 2023-10-20 PROCEDURE — 2580000003 HC RX 258: Performed by: STUDENT IN AN ORGANIZED HEALTH CARE EDUCATION/TRAINING PROGRAM

## 2023-10-20 PROCEDURE — 6360000002 HC RX W HCPCS: Performed by: PHYSICIAN ASSISTANT

## 2023-10-20 RX ORDER — FUROSEMIDE 40 MG/1
40 TABLET ORAL DAILY
Qty: 10 TABLET | Refills: 0 | Status: SHIPPED | OUTPATIENT
Start: 2023-10-21

## 2023-10-20 RX ORDER — AMOXICILLIN AND CLAVULANATE POTASSIUM 875; 125 MG/1; MG/1
1 TABLET, FILM COATED ORAL 2 TIMES DAILY
Qty: 28 TABLET | Refills: 0 | Status: SHIPPED | OUTPATIENT
Start: 2023-10-20 | End: 2023-11-03

## 2023-10-20 RX ORDER — FUROSEMIDE 40 MG/1
40 TABLET ORAL DAILY
Status: DISCONTINUED | OUTPATIENT
Start: 2023-10-21 | End: 2023-10-20 | Stop reason: HOSPADM

## 2023-10-20 RX ORDER — TAMSULOSIN HYDROCHLORIDE 0.4 MG/1
0.4 CAPSULE ORAL DAILY
Qty: 30 CAPSULE | Refills: 3 | Status: SHIPPED | OUTPATIENT
Start: 2023-10-21

## 2023-10-20 RX ORDER — PHENAZOPYRIDINE HYDROCHLORIDE 95 MG/1
95 TABLET ORAL
Qty: 9 TABLET | Refills: 0 | Status: SHIPPED | OUTPATIENT
Start: 2023-10-20 | End: 2023-10-23

## 2023-10-20 RX ADMIN — SODIUM CHLORIDE, PRESERVATIVE FREE 10 ML: 5 INJECTION INTRAVENOUS at 09:50

## 2023-10-20 RX ADMIN — LORAZEPAM 0.5 MG: 2 INJECTION INTRAMUSCULAR; INTRAVENOUS at 06:57

## 2023-10-20 RX ADMIN — SPIRONOLACTONE 25 MG: 25 TABLET ORAL at 09:49

## 2023-10-20 RX ADMIN — METHYLPREDNISOLONE SODIUM SUCCINATE 40 MG: 40 INJECTION, POWDER, FOR SOLUTION INTRAMUSCULAR; INTRAVENOUS at 01:07

## 2023-10-20 RX ADMIN — FUROSEMIDE 40 MG: 10 INJECTION, SOLUTION INTRAMUSCULAR; INTRAVENOUS at 09:49

## 2023-10-20 RX ADMIN — Medication 95 MG: at 09:49

## 2023-10-20 RX ADMIN — PIPERACILLIN AND TAZOBACTAM 3375 MG: 3; .375 INJECTION, POWDER, LYOPHILIZED, FOR SOLUTION INTRAVENOUS at 04:41

## 2023-10-20 RX ADMIN — PIPERACILLIN AND TAZOBACTAM 3375 MG: 3; .375 INJECTION, POWDER, LYOPHILIZED, FOR SOLUTION INTRAVENOUS at 12:36

## 2023-10-20 RX ADMIN — TAMSULOSIN HYDROCHLORIDE 0.4 MG: 0.4 CAPSULE ORAL at 09:51

## 2023-10-20 RX ADMIN — METHYLPREDNISOLONE SODIUM SUCCINATE 40 MG: 40 INJECTION, POWDER, FOR SOLUTION INTRAMUSCULAR; INTRAVENOUS at 09:49

## 2023-10-20 RX ADMIN — Medication 95 MG: at 12:36

## 2023-10-20 ASSESSMENT — PAIN SCALES - GENERAL: PAINLEVEL_OUTOF10: 0

## 2023-10-20 NOTE — DISCHARGE SUMMARY
Discharge Summary    Name: Martha Gillette  [id number]  YOB: 1975 (Age: 50 y.o.)   Date of Admission: 10/10/2023  Date of Discharge: 10/20/2023  Attending Physician: Benny Powers MD    Discharge Diagnosis:   Principal Problem:    SIRS (systemic inflammatory response syndrome) (HCC)  Active Problems:    Malignant neoplasm of overlapping sites of bladder (HCC)    Hematuria    Leukocytosis    Sepsis due to methicillin susceptible Staphylococcus aureus (MSSA) without acute organ dysfunction (HCC)    Complicated UTI (urinary tract infection)    Cellulitis of leg, right    Pneumonia of both lungs due to infectious organism  Resolved Problems:    * No resolved hospital problems. *       Consultations:  IP CONSULT TO UROLOGY  IP CONSULT TO GENERAL SURGERY  IP WOUND CARE NURSE CONSULT TO EVAL  IP WOUND CARE NURSE CONSULT TO EVAL  IP CONSULT TO PICC TEAM  IP CONSULT TO PULMONOLOGY  IP CONSULT TO DIETITIAN  IP CONSULT TO PICC TEAM  IP CONSULT TO INFECTIOUS DISEASES  IP CONSULT TO CARDIOLOGY  IP CONSULT TO INFECTIOUS DISEASES  IP CONSULT TO NEPHROLOGY  IP CONSULT TO PICC TEAM      Brief Admission History/Reason for Admission Per Ismael Thacker MD:   Admission and hospital course  50 y.o.  male with no significant past medical history besides umbilical hernia who presented to the emergency department with multiple complaints being lower back pain, chills, myalgias, right lower leg redness and pain, abdominal pain in the area of umbilical hernia. Initial ED work-up concerning with leukocytosis of 18,000. Patient meeting SIRS criteria with tachycardia and leukocytosis. Blood cultures 10/10 obtained and with negative growth thus far. Unclear source but suspected related to right ankle cellulitis. Venous duplex ultrasound negative for DVT but noted right lower extremity lymphadenopathy.  CT abdomen pelvis with small fat-containing umbilical hernia with mild inflammation,

## 2023-10-20 NOTE — PROGRESS NOTES
10/20/23 0940   Resting (Room Air)   SpO2 95   During Walk (Room Air)   SpO2 91   After Walk   Does the Patient Qualify for Home O2 No   Does the Patient Need Portable Oxygen Tanks No

## 2023-10-20 NOTE — PLAN OF CARE
Problem: Discharge Planning  Goal: Discharge to home or other facility with appropriate resources  10/20/2023 1426 by Saundra Oh, RN  Outcome: Adequate for Discharge  10/20/2023 1426 by Saundra Oh, RN  Outcome: Progressing  10/20/2023 1040 by Saundra Oh RN  Outcome: Progressing     Problem: Pain  Goal: Verbalizes/displays adequate comfort level or baseline comfort level  10/20/2023 1426 by Saundra Oh, RN  Outcome: Adequate for Discharge  10/20/2023 1426 by Saundra Oh RN  Outcome: Progressing  10/20/2023 1040 by Saundra Oh RN  Outcome: Progressing     Problem: Safety - Adult  Goal: Free from fall injury  10/20/2023 1426 by Saundra Oh, RN  Outcome: Adequate for Discharge  10/20/2023 1426 by Saundra Oh, RN  Outcome: Progressing  10/20/2023 1040 by Saundra Oh, RN  Outcome: Progressing     Problem: ABCDS Injury Assessment  Goal: Absence of physical injury  10/20/2023 1426 by Saundra Oh, RN  Outcome: Adequate for Discharge  10/20/2023 1426 by Saundra Oh, RN  Outcome: Progressing  10/20/2023 1040 by Saundra Oh RN  Outcome: Progressing     Problem: Neurosensory - Adult  Goal: Achieves stable or improved neurological status  10/20/2023 1426 by Saundra Oh, RN  Outcome: Adequate for Discharge  10/20/2023 1426 by Saundra hO, RN  Outcome: Progressing  10/20/2023 1040 by Saundra Oh RN  Outcome: Progressing     Problem: Respiratory - Adult  Goal: Achieves optimal ventilation and oxygenation  10/20/2023 1426 by Saundra Oh, RN  Outcome: Adequate for Discharge  10/20/2023 1426 by Saundra Oh, RN  Outcome: Progressing  10/20/2023 1040 by Saundra Oh RN  Outcome: Progressing     Problem: Cardiovascular - Adult  Goal: Maintains optimal cardiac output and hemodynamic stability  10/20/2023 1426 by Saundra Oh, RN  Outcome: Adequate for Discharge  10/20/2023 1426 by Saundra Oh RN  Outcome: Progressing  10/20/2023 1040 by Saundra Oh RN  Outcome: Electrolytes maintained within normal limits  10/20/2023 1426 by Evelyn Fisher RN  Outcome: Adequate for Discharge  10/20/2023 1426 by Evelyn Fisher RN  Outcome: Progressing  10/20/2023 1040 by Evelyn Fisher RN  Outcome: Progressing     Problem: Hematologic - Adult  Goal: Maintains hematologic stability  10/20/2023 1426 by Evelyn Fisher RN  Outcome: Adequate for Discharge  10/20/2023 1426 by Evelyn Fisher RN  Outcome: Progressing  10/20/2023 1040 by Evelyn Fisher RN  Outcome: Progressing     Problem: Spiritual Care  Goal: Pt/Family able to move forward in process of forgiving self, others, and/or higher power  Description: INTERVENTIONS:  1. Assist patient/family to overcome blocks to healing by use of spiritual practices (prayer, meditation, guided imagery, reiki, breath work, etc). 2. De-myth guilt and help patient/family identify possible irrational spiritual/cultural beliefs and values. 3. Explore possibilities of making amends & reconciliation with self, others, and/or a greater power. 4. Guide patient/family in identifying painful feelings of guilt. 5. Help patient/famiy explore and identify spiritual beliefs, cultural understandings or values that may help or hinder letting go of issue. 6. Help patient/family explore feelings of anger, bitterness, resentment. 7. Help patient/family identify and examine the situation in which these feelings are experienced. 8. Help patient/family identify destructive displacement of feelings onto other individuals. 9. Invite use of sacraments/rituals/ceremonies as appropriate (e.g. - confession, anointing, smudging). 10. Refer patient/family to formal counseling and/or to [facility]acy community for further support work.   10/20/2023 1426 by Evelyn Fisher RN  Outcome: Adequate for Discharge  10/20/2023 1426 by Evelyn Fisher RN  Outcome: Progressing  10/20/2023 1040 by Evelyn Fisher RN  Outcome: Progressing

## 2023-10-20 NOTE — TELEPHONE ENCOUNTER
Elsi Aguirre from [facility] needed to make a 1 week follow up appt for bladder cancer discussion.  Patient had surgery on 10/12 with Dr. Alondra Vogel

## 2023-10-20 NOTE — PROGRESS NOTES
IMPRESSION:   Acute hypoxic respiratory failure on room air now  Acute pulmonary edema rule out negative pressure pulmonary edema  Bilateral pulmonary infiltrate rule out ARDS  Status post bladder tumor TURBT  Acute kidney injury  Hypokalemia resolved  Urinary tract infection Staph aureus MSSA  Rule out sepsis with cellulitis right lower extremity  Urinary tract infection with Staph aureus      RECOMMENDATIONS/PLAN:   68-year-old male came in with mild GI back pain redness of the lower extremities found to have a bladder tumor underwent TURBT now he is hypoxic in ICU on 100% noninvasive ventilator  Now he is on nasal cannula doing better  CAT scan of the chest was done which shows fluid overload groundglass opacity possible negative pressure pulmonary edema possible infiltrate rule out ARDS or acute lung injury  Chest x-ray has not changed still shows bilateral infiltrate echo shows normal ejection fraction no systolic or diastolic dysfunction  Continue with high-dose Lasix   UTI with Staph aureus MSSA  Patient on high-dose steroid  2D echo shows ejection fraction 65 to 70% RVSP of 23  Sleep study as an outpatient he needs CPAP machine     [x] High complexity decision making was performed  [x] See my orders for details  HPI  68-year-old male came in because of back pain he was having some chills myalgias right lower extremity redness and also having abdominal pain found to have a bladder mass patient underwent TURBT on 10/12 yesterday and today patient condition got worse rapid response was called he was hypoxic he is now on 100% noninvasive ventilator in ICU CAT scan of the chest was done which was negative for pulm embolism but shows pulmonary edema CHF patient has bilateral groundglass opacity he is able to talk he has a history of COVID January 2022 recovered fully he is impending work-up for sleep apnea but he is not on any CPAP machine at home  PMH:  has no past medical history on file.     PSH:   has a past

## 2023-10-20 NOTE — PROGRESS NOTES
[facility] [address] [address]  Phone: [phone]             October 20, 2023    Patient: Keith Connolly   YOB: 1975   Date of Visit: 10/10/2023       To Whom It May Concern:    Keith Connolly was seen and treated in our facility  beginning 10/10/2023 until . He  11/13/2023.       Sincerely,       SARY Caballero NP         Signature:__________________________________

## 2023-10-20 NOTE — PROGRESS NOTES
CARDIOLOGY PROGRESS NOTE      Patient Name: Devon Lennox  Age: 50 y.o. Gender:male  :1975  MRN: [id number]    Patient seen and examined. This is a patient with a history of back pain, cellulitiswho presented with abdominal and back pain now being followed for pulmonary edema. Resting in bed. Feeling better today overall. No chest pain. Breathing improved. Ready to go home. No other complaints reported. Telemetry reviewed, there were no events noted in the past 24 hours. Pertinent review of systems items noted above, all other systems are negative. Current medications reviewed. Physical Examination    No Known Allergies  Vitals:    10/20/23 0945   BP: 128/67   Pulse: 88   Resp:    Temp:    SpO2:      Vital signs are stable  No apparent distress  Heart has a regular rate and rhythm. no murmur  Lungs are diminished  Abdomen is soft, nontender, normal bowel sounds. Extremities have trace edema  Skin is dry and warm.   Normal affect    Labs reviewed:  Recent Results (from the past 12 hour(s))   C-Reactive Protein    Collection Time: 10/20/23  5:23 AM   Result Value Ref Range    CRP 2.05 (H) 0.00 - 0.60 mg/dL   CBC with Auto Differential    Collection Time: 10/20/23  5:23 AM   Result Value Ref Range    WBC 17.1 (H) 4.1 - 11.1 K/uL    RBC 3.91 (L) 4.10 - 5.70 M/uL    Hemoglobin 11.7 (L) 12.1 - 17.0 g/dL    Hematocrit 35.2 (L) 36.6 - 50.3 %    MCV 90.0 80.0 - 99.0 FL    MCH 29.9 26.0 - 34.0 PG    MCHC 33.2 30.0 - 36.5 g/dL    RDW 14.2 11.5 - 14.5 %    Platelets 152 (H) 345 - 400 K/uL    MPV 9.6 8.9 - 12.9 FL    Nucleated RBCs 0.0 0.0  WBC    nRBC 0.00 0.00 - 0.01 K/uL    Neutrophils % 83 (H) 32 - 75 %    Lymphocytes % 10 (L) 12 - 49 %    Monocytes % 5 5 - 13 %    Eosinophils % 0 0 - 7 %    Basophils % 0 0 - 1 %    Myelocytes 2 (H) 0 %    Immature Granulocytes 0 %    Neutrophils Absolute 14.2 (H) 1.8 - 8.0 K/UL    Lymphocytes Absolute 1.7 0.8 - 3.5 K/UL    Monocytes Absolute 0.9 0.0 - 1.0

## 2023-10-20 NOTE — PLAN OF CARE
Problem: Discharge Planning  Goal: Discharge to home or other facility with appropriate resources  10/20/2023 1040 by Lynne Villanueva RN  Outcome: Progressing  10/20/2023 1040 by Lynne Villanueva RN  Outcome: Progressing     Problem: Pain  Goal: Verbalizes/displays adequate comfort level or baseline comfort level  10/20/2023 1040 by Lynne Villanueva RN  Outcome: Progressing  10/20/2023 1040 by Lynne Villanueva RN  Outcome: Progressing     Problem: Safety - Adult  Goal: Free from fall injury  10/20/2023 1040 by Lynne Villanueva RN  Outcome: Progressing  10/20/2023 1040 by Lynne Villanueva RN  Outcome: Progressing     Problem: ABCDS Injury Assessment  Goal: Absence of physical injury  10/20/2023 1040 by Lynne Villanueva RN  Outcome: Progressing  10/20/2023 1040 by Lynne Villanueva RN  Outcome: Progressing     Problem: Neurosensory - Adult  Goal: Achieves stable or improved neurological status  10/20/2023 1040 by Lynne Villanueva RN  Outcome: Progressing  10/20/2023 1040 by Lynne Villanueva RN  Outcome: Progressing     Problem: Respiratory - Adult  Goal: Achieves optimal ventilation and oxygenation  Outcome: Progressing     Problem: Cardiovascular - Adult  Goal: Maintains optimal cardiac output and hemodynamic stability  Outcome: Progressing  Goal: Absence of cardiac dysrhythmias or at baseline  Outcome: Progressing     Problem: Skin/Tissue Integrity - Adult  Goal: Skin integrity remains intact  Outcome: Progressing     Problem: Musculoskeletal - Adult  Goal: Return mobility to safest level of function  Outcome: Progressing     Problem: Gastrointestinal - Adult  Goal: Minimal or absence of nausea and vomiting  Outcome: Progressing  Goal: Maintains or returns to baseline bowel function  Outcome: Progressing     Problem: Genitourinary - Adult  Goal: Absence of urinary retention  Outcome: Progressing     Problem: Infection - Adult  Goal: Absence of infection at discharge  Outcome: Progressing     Problem: Metabolic/Fluid and

## 2023-10-20 NOTE — PROGRESS NOTES
IMPRESSION:   Acute hypoxic respiratory failure  Acute pulmonary edema rule out negative pressure pulmonary edema  Bilateral pulmonary infiltrate rule out ARDS  Status post bladder tumor TURBT  Acute kidney injury  Hypokalemia resolved  Urinary tract infection Staph aureus MSSA  Rule out sepsis with cellulitis right lower extremity  Urinary tract infection with Staph aureus      RECOMMENDATIONS/PLAN:   55-year-old male came in with mild GI back pain redness of the lower extremities found to have a bladder tumor underwent TURBT now he is hypoxic in ICU on 100% noninvasive ventilator  Now he is on nasal cannula doing better  CAT scan of the chest was done which shows fluid overload groundglass opacity possible negative pressure pulmonary edema possible infiltrate rule out ARDS or acute lung injury  Chest x-ray has not changed still shows bilateral infiltrate echo shows normal ejection fraction no systolic or diastolic dysfunction  Continue with high-dose Lasix monitor BUN and creatinine  Arterial blood gas much improved now on 35% FiO2 on high flow PCO2 41 PO2 63 we will continue to wean  UTI with Staph aureus MSSA  Patient on high-dose steroid  2D echo shows ejection fraction 65 to 70% RVSP of 23  Worsening of the renal function change Lasix to 40 twice daily  Intubate and place on vent if NIV fails  Sleep study as an outpatient he needs CPAP machine     [x] High complexity decision making was performed  [x] See my orders for details  HPI  55-year-old male came in because of back pain he was having some chills myalgias right lower extremity redness and also having abdominal pain found to have a bladder mass patient underwent TURBT on 10/12 yesterday and today patient condition got worse rapid response was called he was hypoxic he is now on 100% noninvasive ventilator in ICU CAT scan of the chest was done which was negative for pulm embolism but shows pulmonary edema CHF patient has bilateral groundglass opacity he management decisions. Results must be combined with clinical observations, patient history, and epidemiological information        Rapid Influenza A By PCR Not Detected        Rapid Influenza B By PCR Not Detected        Comment: Testing was performed using cheli Rupinder SARS-CoV-2 and Influenza A/B nucleic acid assay. This test is a multiplex Real-Time Reverse Transcriptase Polymerase Chain Reaction (RT-PCR) based in vitro diagnostic test intended for the qualitative detection of nucleic acids from SARS-CoV-2, Influenza A, and Influenza B in nasopharyngeal and nasal swab specimens for use under the FDA's Emergency Use Authorization (EUA) only. Fact sheet for Patients: FindDrives.pl Fact sheet   for Healthcare Providers: FindRomotiveives.pl         COVID-19, Rapid [[id number]]     Order Status: Canceled Specimen: Nasopharyngeal Swab     COVID-19, Rapid [[id number]] Collected: 10/10/23 2003    Order Status: Completed Specimen: Nasopharyngeal Updated: 10/10/23 2118     SARS-CoV-2, Rapid Not Detected        Comment: Rapid Abbott ID Now   The specimen is NEGATIVE for SARS-CoV2, the novel coronavirus associated with COVID-19. A negative result does not rule out COVID-19. This test has been authorized by the FDA under an Emergency Use Authorization (EUA) for use by authorized laboratories.    Fact sheet for Healthcare Providers:  http://www.baljinder-jones.sharri/ Fact sheet for Patients: http://www.gale-jones.biz/   Methodology: Isothermal Nucleic Acid Amplification       Rapid influenza A/B antigens [[id number]] Collected: 10/10/23 2003    Order Status: Completed Specimen: Nasal Washing Updated: 10/10/23 2120     Influenza A Ag Negative        Influenza B Ag Negative       COVID-19 & Influenza Combo [[id number]]     Order Status: Canceled Specimen: Nasopharyngeal Swab     Blood Culture 1 [[id number]] Collected: 10/10/23 1516    Order Status: arterial branches is limited by severe motion artifact. No evidence of pulmonary embolism in the main or lobar arteries. 10/14 alert awake on high flow nasal cannula still severely hypoxic chest x-ray still shows bilateral congestive changes worsening of the renal function supplement potassium decrease Lasix dose, asking for pain medication  10/16 patient is complaining about burning in the leg patient wears noninvasive ventilator BiPAP at night now on high flow nasal cannula chest x-ray still showed persistent infiltrate we will get procalcitonin and CRP  10/17 patient still on high flow nasal cannula short of breath dyspneic 2D echo normal chest x-ray did not show significant difference after diuresis we will start patient on high-dose steroids possibility of ARDS/fibrosis  10/18 on high flow 30% FiO2 continue to wean on Solu-Medrol and antibiotics MSSA UTI chest x-ray improving  10/18 now he is on regular nasal cannula continue with the Solu-Medrol Lasix and also on Zosyn  10/20 Patient is on RA, satting well. Patient says that he's doing well, no complaints.

## 2023-10-20 NOTE — PROGRESS NOTES
UROLOGY CONSULT NOTE  [phone]        Patient: Lady Rhodes MRN: [id number]  SSN: [id number]    YOB: 1975  Age: 50 y.o. Sex: male        Subjective:      Lady Rhodes is a 50 y.o. male who is being seen in urologic consultation for  He is s/p TURBT (incomplete resection) on 10/12/23. Pathology: Papillary urothelial carcinoma, noninvasive, Low grade     On 10/13/2023 patient developed gross hematuria, had manual clot evacuation with walker cath change. On 10/13/2023 patient became hypoxic and was transferred to ICU,CT of the chest was done and was negative for PE, but positive for pulmonary edema. He is monitored by pulmonology and cardiology  Cali Zamorano seen at the bedside alert and oriented, off nasal canula. He is ready to go home. History reviewed. No pertinent past medical history.   Past Surgical History:   Procedure Laterality Date    ANTERIOR CRUCIATE LIGAMENT REPAIR Left 2000    CYSTOSCOPY N/A 10/12/2023    CYSTOURETHROSCOPY, POSSIBLE TRANSURETHRAL RESECTION OF LARGE BLADDER CANCER performed by Mahnaz Turner MD at [facility] MAIN OR    HAND RECONSTRUCTION Left       Family History   Problem Relation Age of Onset    No Known Problems Mother     Pancreatic Cancer Father      Social History     Tobacco Use    Smoking status: Former     Types: Cigarettes    Smokeless tobacco: Not on file   Substance Use Topics    Alcohol use: No      Current Facility-Administered Medications   Medication Dose Route Frequency Provider Last Rate Last Admin    [START ON 10/21/2023] furosemide (LASIX) tablet 40 mg  40 mg Oral Daily [place], 5458 Darshan MIRANDA, APRN - NP        diclofenac sodium (VOLTAREN) 1 % gel 2 g  2 g Topical 4x Daily PRN Lexis Richmond MD   2 g at 10/17/23 0503    methylPREDNISolone sodium (PF) (SOLU-MEDROL PF) injection 40 mg  40 mg IntraVENous Q8H Codi Monsalve MD   40 mg at 10/20/23 0949    piperacillin-tazobactam (ZOSYN) 3,375 mg in sodium chloride 0.9 % 50 mL IVPB (mini-bag) 3,375 mg IntraVENous Q8H Trista CARPIO MD 12.5 mL/hr at 10/20/23 1236 3,375 mg at 10/20/23 1236    spironolactone (ALDACTONE) tablet 25 mg  25 mg Oral Daily Manoj Cross MD   25 mg at 10/20/23 0949    ipratropium 0.5 mg-albuterol 2.5 mg (DUONEB) nebulizer solution 1 Dose  1 Dose Inhalation Q4H PRN Eleni Monsalve MD   1 Dose at 10/16/23 0754    diphenhydrAMINE (BENADRYL) capsule 25 mg  25 mg Oral Q6H PRN Stephani Mcconnell PA-C        melatonin tablet 5 mg  5 mg Oral Nightly PRN Shade Castro MD   5 mg at 10/18/23 2313    LORazepam (ATIVAN) injection 0.5 mg  0.5 mg IntraVENous Q6H PRN Jose Alfred PA-C   0.5 mg at 10/20/23 9234    hydrocortisone 1 % cream   Topical BID Javier Joaquin PA-C   Given at 10/18/23 2014    phenazopyridine (PYRIDIUM) tablet 95 mg  95 mg Oral TID  Loni Johnson MD   95 mg at 10/20/23 1236    oxyCODONE-acetaminophen (PERCOCET) 5-325 MG per tablet 1 tablet  1 tablet Oral Q6H PRN Loni Johnson MD   1 tablet at 10/19/23 0419    diatrizoate meglumine-sodium (GASTROGRAFIN) 66-10 % solution 30 mL  30 mL Oral ONCE PRN Wai Lundberg MD   30 mL at 10/10/23 1343    tamsulosin (FLOMAX) capsule 0.4 mg  0.4 mg Oral Daily Wai Lundberg MD   0.4 mg at 10/20/23 0951    sodium chloride flush 0.9 % injection 5-40 mL  5-40 mL IntraVENous 2 times per day Javier Joaquin PA-C   10 mL at 10/20/23 0950    sodium chloride flush 0.9 % injection 5-40 mL  5-40 mL IntraVENous PRN Stephani Mcconnell PA-C        0.9 % sodium chloride infusion   IntraVENous PRN Stephani Mcconnell PA-C        ondansetron (ZOFRAN-ODT) disintegrating tablet 4 mg  4 mg Oral Q8H PRN Stephani Mcconnell PA-C        Or    ondansetron [facility]) injection 4 mg  4 mg IntraVENous Q6H PRN Stephani Mcconnell PA-C   4 mg at 10/16/23 0321    polyethylene glycol (GLYCOLAX) packet 17 g  17 g Oral Daily PRN Stephani Mcconnell PA-C        acetaminophen (TYLENOL) tablet 650 mg  650 mg Oral Q6H PRN Herrera Gakarma, K/UL    Eosinophils Absolute 0.0 0.0 - 0.4 K/UL    Basophils Absolute 0.0 0.0 - 0.1 K/UL    Absolute Immature Granulocyte 0.0 K/UL    Differential Type Manual      Platelet Comment Large Platelets      RBC Comment Normocytic, Normochromic     Renal Function Panel    Collection Time: 10/20/23  5:23 AM   Result Value Ref Range    Sodium 134 (L) 136 - 145 mmol/L    Potassium 4.2 3.5 - 5.1 mmol/L    Chloride 101 97 - 108 mmol/L    CO2 26 21 - 32 mmol/L    Anion Gap 7 5 - 15 mmol/L    Glucose 163 (H) 65 - 100 mg/dL    BUN 39 (H) 6 - 20 mg/dL    Creatinine 1.76 (H) 0.70 - 1.30 mg/dL    Bun/Cre Ratio 22 (H) 12 - 20      Est, Glom Filt Rate 47 (L) >60 ml/min/1.73m2    Calcium 9.4 8.5 - 10.1 mg/dL    Phosphorus 3.6 2.6 - 4.7 mg/dL    Albumin 2.8 (L) 3.5 - 5.0 g/dL   Procalcitonin    Collection Time: 10/20/23  5:23 AM   Result Value Ref Range    Procalcitonin 0.05 (H) 0 ng/mL       Physical Exam:  Physical Exam  Eyes:      Pupils: Pupils are equal, round, and reactive to light. Pulmonary:      Effort: Pulmonary effort is normal.   Abdominal:      Palpations: Abdomen is soft. Genitourinary:     Comments: voids  Skin:     Capillary Refill: Capillary refill takes less than 2 seconds. Neurological:      General: No focal deficit present. Mental Status: He is alert. Assessment:         Plan: 1. Bladder mass  He is s/p TURBT from 10/12/2023  Pathology results:Papillary urothelial carcinoma, noninvasive, Low grade     Plan:  repeat TURBT  with mitomycin installation in 6 weeks. Patient will need to follow up with DR Minh Denny in 2 weeks after discharge to schedule a procedure     2. Hematuria   -resolved  -walker cath discontinued, patient voids without any diffculties      Signed By: SARY Mondragon - NP     October 20, 2023      Please note that portions of this note were completed with Dragon dictation, the computer voice recognition software.   Quite often unanticipated grammatical, syntax,

## 2023-10-20 NOTE — PLAN OF CARE
Problem: Discharge Planning  Goal: Discharge to home or other facility with appropriate resources  10/20/2023 1426 by Evelyn Fisher RN  Outcome: Progressing  10/20/2023 1040 by Evelyn Fisher RN  Outcome: Progressing     Problem: Pain  Goal: Verbalizes/displays adequate comfort level or baseline comfort level  10/20/2023 1426 by Evelyn Fisher RN  Outcome: Progressing  10/20/2023 1040 by Evelyn Fisher RN  Outcome: Progressing     Problem: Safety - Adult  Goal: Free from fall injury  10/20/2023 1426 by Evelyn Fisher RN  Outcome: Progressing  10/20/2023 1040 by Evelyn Fisher RN  Outcome: Progressing     Problem: ABCDS Injury Assessment  Goal: Absence of physical injury  10/20/2023 1426 by Evelyn Fisher RN  Outcome: Progressing  10/20/2023 1040 by Evelyn Fisher RN  Outcome: Progressing     Problem: Neurosensory - Adult  Goal: Achieves stable or improved neurological status  10/20/2023 1426 by Evelyn Fisher RN  Outcome: Progressing  10/20/2023 1040 by Evelyn Fisher RN  Outcome: Progressing     Problem: Respiratory - Adult  Goal: Achieves optimal ventilation and oxygenation  10/20/2023 1426 by Evelyn Fisher RN  Outcome: Progressing  10/20/2023 1040 by Evelyn Fisher RN  Outcome: Progressing     Problem: Cardiovascular - Adult  Goal: Maintains optimal cardiac output and hemodynamic stability  10/20/2023 1426 by Evelyn Fisher RN  Outcome: Progressing  10/20/2023 1040 by Evelyn Fisher RN  Outcome: Progressing  Goal: Absence of cardiac dysrhythmias or at baseline  10/20/2023 1426 by Evelyn Fisher RN  Outcome: Progressing  10/20/2023 1040 by Evelyn Fisher RN  Outcome: Progressing     Problem: Skin/Tissue Integrity - Adult  Goal: Skin integrity remains intact  10/20/2023 1426 by Evelyn Fisher RN  Outcome: Progressing  10/20/2023 1040 by Evelyn Fisher RN  Outcome: Progressing     Problem: Musculoskeletal - Adult  Goal: Return mobility to safest level of function  10/20/2023 1426 by Chris Castrejon, Ke Plaza RN  Outcome: Progressing  10/20/2023 1040 by Marco Dillon RN  Outcome: Progressing     Problem: Gastrointestinal - Adult  Goal: Minimal or absence of nausea and vomiting  10/20/2023 1426 by Marco Dillon RN  Outcome: Progressing  10/20/2023 1040 by Marco Dillon RN  Outcome: Progressing  Goal: Maintains or returns to baseline bowel function  10/20/2023 1426 by Marco Dillon RN  Outcome: Progressing  10/20/2023 1040 by Marco Dillon RN  Outcome: Progressing     Problem: Genitourinary - Adult  Goal: Absence of urinary retention  10/20/2023 1426 by Marco Dillon RN  Outcome: Progressing  10/20/2023 1040 by Marco Dillon RN  Outcome: Progressing     Problem: Infection - Adult  Goal: Absence of infection at discharge  10/20/2023 1426 by Marco Dillon RN  Outcome: Progressing  10/20/2023 1040 by Marco Dillon RN  Outcome: Progressing     Problem: Metabolic/Fluid and Electrolytes - Adult  Goal: Electrolytes maintained within normal limits  10/20/2023 1426 by Marco Dillon RN  Outcome: Progressing  10/20/2023 1040 by Marco Dillon RN  Outcome: Progressing     Problem: Hematologic - Adult  Goal: Maintains hematologic stability  10/20/2023 1426 by Marco Dillon RN  Outcome: Progressing  10/20/2023 1040 by Marco Dillon RN  Outcome: Progressing     Problem: Spiritual Care  Goal: Pt/Family able to move forward in process of forgiving self, others, and/or higher power  Description: INTERVENTIONS:  1. Assist patient/family to overcome blocks to healing by use of spiritual practices (prayer, meditation, guided imagery, reiki, breath work, etc). 2. De-myth guilt and help patient/family identify possible irrational spiritual/cultural beliefs and values. 3. Explore possibilities of making amends & reconciliation with self, others, and/or a greater power. 4. Guide patient/family in identifying painful feelings of guilt.   5. Help patient/famiy explore and identify spiritual beliefs, cultural understandings or values that may help or hinder letting go of issue. 6. Help patient/family explore feelings of anger, bitterness, resentment. 7. Help patient/family identify and examine the situation in which these feelings are experienced. 8. Help patient/family identify destructive displacement of feelings onto other individuals. 9. Invite use of sacraments/rituals/ceremonies as appropriate (e.g. - confession, anointing, smudging). 10. Refer patient/family to formal counseling and/or to tracy community for further support work.   10/20/2023 1426 by Petra Warren RN  Outcome: Progressing  10/20/2023 1040 by Petra Warren RN  Outcome: Progressing

## 2023-10-20 NOTE — CARE COORDINATION
0815: Chart reviewed. Per notes; patient on IV ABX, IV Lasix and IV Steroids followed via cardiology, ID, nephrology and urology. PT/OT evals pending discharge recs. CM will continue to follow patient and recs of medical team.    1350: Discharge summary/order home noted. PT/OT evals noted, no needs. RT note reviewed, no needs. When transportation available, patient to discharge home per order. Discharge Checklist Completed. Transition of Care Plan:    RUR: 12%  Prior Level of Functioning: Independent  Disposition: Home  If SNF or IPR: Date FOC offered: N/A  Date FOC received: N/A  Accepting facility: N/A  Date authorization started with reference number: N/A  Date authorization received and expires: N/A  Follow up appointments: Discharge Summary  DME needed: None  Transportation at discharge: Family  IM/IMM Medicare/ letter given: N/A  Is patient a [place] and connected with [facility]? No  If yes, was [employer] transfer form completed and VA notified? N/A  Caregiver Contact: Patient   Discharge Caregiver contacted prior to discharge? Patient  Care Conference needed?  No  Barriers to discharge: None

## 2023-10-20 NOTE — PROGRESS NOTES
4 Eyes Skin Assessment     NAME:  Zoran Patton  YOB: 1975  MEDICAL RECORD NUMBER:  [id number]    The patient is being assessed for  Transfer to New Unit    I agree that at least one RN has performed a thorough Head to Toe Skin Assessment on the patient. ALL assessment sites listed below have been assessed. Areas assessed by both nurses:    Head, Face, Ears, Shoulders, Back, Chest, Arms, Elbows, Hands, Sacrum. Buttock, Coccyx, Ischium, and Legs. Feet and Heels        Does the Patient have a Wound?  No noted wound(s)       Trey Prevention initiated by RN: No  Wound Care Orders initiated by RN: No    Pressure Injury (Stage 3,4, Unstageable, DTI, NWPT, and Complex wounds) if present, place Wound referral order by RN under : No, N/A    New Ostomies, if present place, Ostomy referral order under : No     Nurse 1 eSignature: Electronically signed by Rufus Carreon RN on 10/19/23 at 10:32 PM EDT    **SHARE this note so that the co-signing nurse can place an eSignature**    Nurse 2 eSignature: {Esignature:[id number]}

## 2023-10-21 LAB
BACTERIA SPEC CULT: NORMAL
BACTERIA SPEC CULT: NORMAL
Lab: NORMAL
Lab: NORMAL

## 2023-10-25 ENCOUNTER — OFFICE VISIT (OUTPATIENT)
Age: 48
End: 2023-10-25
Payer: MEDICAID

## 2023-10-25 VITALS — OXYGEN SATURATION: 100 % | SYSTOLIC BLOOD PRESSURE: 132 MMHG | HEART RATE: 89 BPM | DIASTOLIC BLOOD PRESSURE: 83 MMHG

## 2023-10-25 DIAGNOSIS — J18.9 PNEUMONIA OF BOTH LUNGS DUE TO INFECTIOUS ORGANISM, UNSPECIFIED PART OF LUNG: ICD-10-CM

## 2023-10-25 DIAGNOSIS — Z09 HOSPITAL DISCHARGE FOLLOW-UP: ICD-10-CM

## 2023-10-25 DIAGNOSIS — N39.0 COMPLICATED UTI (URINARY TRACT INFECTION): ICD-10-CM

## 2023-10-25 DIAGNOSIS — C67.8 MALIGNANT NEOPLASM OF OVERLAPPING SITES OF BLADDER (HCC): Primary | ICD-10-CM

## 2023-10-25 DIAGNOSIS — A41.01 SEPSIS DUE TO METHICILLIN SUSCEPTIBLE STAPHYLOCOCCUS AUREUS (MSSA) WITHOUT ACUTE ORGAN DYSFUNCTION (HCC): ICD-10-CM

## 2023-10-25 DIAGNOSIS — R65.10 SIRS (SYSTEMIC INFLAMMATORY RESPONSE SYNDROME) (HCC): ICD-10-CM

## 2023-10-25 PROCEDURE — 1111F DSCHRG MED/CURRENT MED MERGE: CPT | Performed by: UROLOGY

## 2023-10-25 PROCEDURE — 99204 OFFICE O/P NEW MOD 45 MIN: CPT | Performed by: UROLOGY

## 2023-10-25 RX ORDER — POTASSIUM CHLORIDE 1.5 G/1.58G
20 POWDER, FOR SOLUTION ORAL 2 TIMES DAILY
COMMUNITY

## 2023-10-25 NOTE — PROGRESS NOTES
HISTORY OF PRESENT ILLNESS  Mitchell Zaman is a 50 y.o. male     1. Malignant neoplasm of overlapping sites of bladder [facility])  Overview:  S/p TURBT 10/12/23: incomplete resection. On 10/13, he required manual clot evacuation and walker catheter exchange due to gross hematuria. Afterwards, he was transferred to ICU due to hypoxia and pulmonary edema. Path: Papillary urothelial carcinoma, noninvasive, low grade   Orders:  -     AMB POC URINALYSIS DIP STICK AUTO W/O MICRO  2. Complicated UTI (urinary tract infection)  3. Sepsis due to methicillin susceptible Staphylococcus aureus (MSSA) without acute organ dysfunction ([address])  4. SIRS (systemic inflammatory response syndrome) (HCC)  5. Pneumonia of both lungs due to infectious organism, unspecified part of lung  Patient had a lot of bladder cancer in his bladder. Pathology returned as noninvasive. Need to go back cleaning anymore out that still there and do deeper pieces to be sure its not invasive and use mitomycin. Problem congestive heart failure as well so we will be very judicious on the fluids etc.      PAST MEDICAL HISTORY  PMHx (including negatives):  has no past medical history on file. PSurgHx:  has a past surgical history that includes Hand reconstruction (Left); Anterior cruciate ligament repair (Left, 2000); and Cystoscopy (N/A, 10/12/2023). PSocHx:  reports that he has quit smoking. His smoking use included cigarettes. He does not have any smokeless tobacco history on file. He reports that he does not drink alcohol and does not use drugs. FamilyHX:   Family History   Problem Relation Age of Onset    No Known Problems Mother     Pancreatic Cancer Father        ROS  Review of Systems   All other systems reviewed and are negative. PHYSICAL EXAM  Physical Exam  Physical Exam   Constitutional: He is oriented to person, place, and time. He appears well-developed and well-nourished. No distress. HENT:   Head: Normocephalic and atraumatic.

## 2023-10-25 NOTE — PROGRESS NOTES
Chief Complaint   Patient presents with    Follow-Up from Hospital    Bladder Cancer        /83 (Site: Left Upper Arm)   Pulse 89   SpO2 100%      PHQ-9 score is    Negative      1. \"Have you been to the ER, urgent care clinic since your last visit? Hospitalized since your last visit? \" No    2. \"Have you seen or consulted any other health care providers outside of the [address] [place] [address] since your last visit? \" No     3. For patients aged 43-73: Has the patient had a colonoscopy / FIT/ Cologuard? Yes - no Care Gap present      If the patient is female:    4. For patients aged 43-66: Has the patient had a mammogram within the past 2 years? NA - based on age or sex      11. For patients aged 21-65: Has the patient had a pap smear?  NA - based on age or sex

## 2023-11-27 ENCOUNTER — OFFICE VISIT (OUTPATIENT)
Age: 48
End: 2023-11-27
Payer: MEDICAID

## 2023-11-27 VITALS
DIASTOLIC BLOOD PRESSURE: 80 MMHG | SYSTOLIC BLOOD PRESSURE: 119 MMHG | RESPIRATION RATE: 17 BRPM | TEMPERATURE: 98.4 F | WEIGHT: 309.2 LBS | OXYGEN SATURATION: 95 % | BODY MASS INDEX: 39.68 KG/M2 | HEART RATE: 89 BPM | HEIGHT: 74 IN

## 2023-11-27 DIAGNOSIS — D49.4 BLADDER TUMOR: ICD-10-CM

## 2023-11-27 DIAGNOSIS — K42.9 UMBILICAL HERNIA WITHOUT OBSTRUCTION AND WITHOUT GANGRENE: Primary | ICD-10-CM

## 2023-11-27 PROCEDURE — 99213 OFFICE O/P EST LOW 20 MIN: CPT | Performed by: SURGERY

## 2023-11-27 ASSESSMENT — PATIENT HEALTH QUESTIONNAIRE - PHQ9
SUM OF ALL RESPONSES TO PHQ QUESTIONS 1-9: 0
1. LITTLE INTEREST OR PLEASURE IN DOING THINGS: 0
SUM OF ALL RESPONSES TO PHQ9 QUESTIONS 1 & 2: 0
SUM OF ALL RESPONSES TO PHQ QUESTIONS 1-9: 0
2. FEELING DOWN, DEPRESSED OR HOPELESS: 0
SUM OF ALL RESPONSES TO PHQ QUESTIONS 1-9: 0
SUM OF ALL RESPONSES TO PHQ QUESTIONS 1-9: 0

## 2023-12-04 ENCOUNTER — TELEPHONE (OUTPATIENT)
Age: 48
End: 2023-12-04

## 2023-12-04 NOTE — TELEPHONE ENCOUNTER
Dena Bejarano called in due to needing a return to work for [employer] duty, he stated his surgery is scheduled some time in January and he wants to go back to work but knows he needs light duty.  Give him a call at [phone]

## 2023-12-04 NOTE — TELEPHONE ENCOUNTER
Contacted patient to find out when he was trying to return to work and how long he has been out. Pt has been out since 09/10 and would like to return as soon as possible or at 12/11 at the latest. Asked how he wanted to pick the letter up if approved by Gianna, pt will be picking the letter up from the office. Also verified that he is no longer having the surgery that is scheduled for 12/8/23 amd he said no he got a new doctor.

## 2023-12-07 ENCOUNTER — TELEPHONE (OUTPATIENT)
Age: 48
End: 2023-12-07

## 2023-12-07 NOTE — TELEPHONE ENCOUNTER
Received notice from PAT, that pt was not having surgery on 12/8/23 as scheduled. Pt told PAT that he was receiving care elsewhere. Tried to reach pt by phone to confirm cancellation, to no avail. LVM alerting pt to contact office to confirm cancellation of surgery on 12/8/23.

## 2023-12-11 NOTE — PERIOP NOTE
Pt at work, unable to speak or write information. He's to call back after 3;00 to schedule PAT APPT

## 2023-12-12 ASSESSMENT — ENCOUNTER SYMPTOMS
BACK PAIN: 0
NAUSEA: 0
SORE THROAT: 0
SHORTNESS OF BREATH: 0
WHEEZING: 0
ABDOMINAL PAIN: 0
EYE REDNESS: 0
COUGH: 0
VOMITING: 0

## 2023-12-12 NOTE — PROGRESS NOTES
[facility] Gianna, DO  [address]  [phone]    General Surgery Follow Up    Patient Name: Zuly Mccracken (50 y.o., male)    PCP: No primary care provider on file. Subjective:      Zuly Mccracken is a 50 y.o. male presents for follow up regarding his umbilical hernia. He was originally scheduled to undergo robotic assisted repair of umbilical hernia on 57/24/9132. He was unfortunately admitted to the hospital on 10/10/2023 with findings of SIRS/sepsis from possible right lower leg cellulitis. Subsequent imaging of the abdomen demonstrated a large bladder mass. He was seen by urology and underwent transurethral resection of a large bladder tumor but not all of the tumor was able to be excised. After this procedure, the patient was transferred to the ICU as he developed pleural effusions and hypoxic respiratory failure. He has since recovered and is following up outpatient with cardiology for cardiac PET and with urology for additional intervention for the bladder tumor. He admits to some mild discomfort from his umbilical hernia, but he is very worried about the bladder cancer and wants that taken care of as soon as possible. History reviewed. No pertinent past medical history. Past Surgical History:   Procedure Laterality Date    ANTERIOR CRUCIATE LIGAMENT REPAIR Left 2000    CYSTOSCOPY N/A 10/12/2023    CYSTOURETHROSCOPY, POSSIBLE TRANSURETHRAL RESECTION OF LARGE BLADDER CANCER performed by Jose Rios MD at [facility] MAIN OR    HAND RECONSTRUCTION Left        Family History   Problem Relation Age of Onset    No Known Problems Mother     Pancreatic Cancer Father        Social History     Tobacco Use    Smoking status: Former     Types: Cigarettes   Substance Use Topics    Alcohol use: No    Drug use: Never       Prior to Visit Medications    Medication Sig Taking?  Authorizing Provider   tamsulosin (FLOMAX) 0.4

## 2023-12-27 ENCOUNTER — HOSPITAL ENCOUNTER (OUTPATIENT)
Facility: HOSPITAL | Age: 48
Discharge: HOME | End: 2023-12-30
Payer: MEDICAID

## 2023-12-27 ENCOUNTER — HOSPITAL ENCOUNTER (OUTPATIENT)
Age: 48
Discharge: HOME | End: 2023-12-30
Payer: MEDICAID

## 2023-12-27 VITALS
DIASTOLIC BLOOD PRESSURE: 79 MMHG | WEIGHT: 313 LBS | TEMPERATURE: 97.5 F | HEART RATE: 74 BPM | SYSTOLIC BLOOD PRESSURE: 123 MMHG | HEIGHT: 74 IN | BODY MASS INDEX: 40.17 KG/M2 | RESPIRATION RATE: 12 BRPM

## 2023-12-27 LAB
ALBUMIN SERPL-MCNC: 4.1 G/DL (ref 3.5–5)
ALBUMIN/GLOB SERPL: 1 (ref 1.1–2.2)
ALP SERPL-CCNC: 91 U/L (ref 45–117)
ALT SERPL-CCNC: 36 U/L (ref 12–78)
ANION GAP SERPL CALC-SCNC: 4 MMOL/L (ref 5–15)
APPEARANCE UR: CLEAR
AST SERPL-CCNC: 16 U/L (ref 15–37)
BACTERIA URNS QL MICRO: NEGATIVE /HPF
BASOPHILS # BLD: 0 K/UL (ref 0–0.1)
BASOPHILS NFR BLD: 1 % (ref 0–1)
BILIRUB SERPL-MCNC: 0.8 MG/DL (ref 0.2–1)
BILIRUB UR QL: NEGATIVE
BUN SERPL-MCNC: 17 MG/DL (ref 6–20)
BUN/CREAT SERPL: 22 (ref 12–20)
CALCIUM SERPL-MCNC: 8.9 MG/DL (ref 8.5–10.1)
CHLORIDE SERPL-SCNC: 112 MMOL/L (ref 97–108)
CO2 SERPL-SCNC: 24 MMOL/L (ref 21–32)
COLOR UR: ABNORMAL
CREAT SERPL-MCNC: 0.79 MG/DL (ref 0.7–1.3)
DIFFERENTIAL METHOD BLD: ABNORMAL
EOSINOPHIL # BLD: 0.1 K/UL (ref 0–0.4)
EOSINOPHIL NFR BLD: 1 % (ref 0–7)
EPITH CASTS URNS QL MICRO: ABNORMAL /LPF
ERYTHROCYTE [DISTWIDTH] IN BLOOD BY AUTOMATED COUNT: 13.8 % (ref 11.5–14.5)
GLOBULIN SER CALC-MCNC: 4 G/DL (ref 2–4)
GLUCOSE SERPL-MCNC: 97 MG/DL (ref 65–100)
GLUCOSE UR STRIP.AUTO-MCNC: NEGATIVE MG/DL
HCT VFR BLD AUTO: 40.6 % (ref 36.6–50.3)
HGB BLD-MCNC: 13.6 G/DL (ref 12.1–17)
HGB UR QL STRIP: ABNORMAL
HYALINE CASTS URNS QL MICRO: ABNORMAL /LPF (ref 0–5)
IMM GRANULOCYTES # BLD AUTO: 0.1 K/UL (ref 0–0.04)
IMM GRANULOCYTES NFR BLD AUTO: 1 % (ref 0–0.5)
KETONES UR QL STRIP.AUTO: NEGATIVE MG/DL
LEUKOCYTE ESTERASE UR QL STRIP.AUTO: NEGATIVE
LYMPHOCYTES # BLD: 2.3 K/UL (ref 0.8–3.5)
LYMPHOCYTES NFR BLD: 34 % (ref 12–49)
MCH RBC QN AUTO: 30 PG (ref 26–34)
MCHC RBC AUTO-ENTMCNC: 33.5 G/DL (ref 30–36.5)
MCV RBC AUTO: 89.4 FL (ref 80–99)
MONOCYTES # BLD: 0.6 K/UL (ref 0–1)
MONOCYTES NFR BLD: 8 % (ref 5–13)
NEUTS SEG # BLD: 3.6 K/UL (ref 1.8–8)
NEUTS SEG NFR BLD: 55 % (ref 32–75)
NITRITE UR QL STRIP.AUTO: NEGATIVE
NRBC # BLD: 0 K/UL (ref 0–0.01)
NRBC BLD-RTO: 0 PER 100 WBC
PH UR STRIP: 7 (ref 5–8)
PLATELET # BLD AUTO: 220 K/UL (ref 150–400)
PMV BLD AUTO: 9.6 FL (ref 8.9–12.9)
POTASSIUM SERPL-SCNC: 4 MMOL/L (ref 3.5–5.1)
PROT SERPL-MCNC: 8.1 G/DL (ref 6.4–8.2)
PROT UR STRIP-MCNC: NEGATIVE MG/DL
RBC # BLD AUTO: 4.54 M/UL (ref 4.1–5.7)
RBC #/AREA URNS HPF: ABNORMAL /HPF (ref 0–5)
SODIUM SERPL-SCNC: 140 MMOL/L (ref 136–145)
SP GR UR REFRACTOMETRY: 1.02 (ref 1–1.03)
URINE CULTURE IF INDICATED: ABNORMAL
UROBILINOGEN UR QL STRIP.AUTO: 0.2 EU/DL (ref 0.2–1)
WBC # BLD AUTO: 6.6 K/UL (ref 4.1–11.1)
WBC URNS QL MICRO: ABNORMAL /HPF (ref 0–4)

## 2023-12-27 PROCEDURE — 36415 COLL VENOUS BLD VENIPUNCTURE: CPT

## 2023-12-27 PROCEDURE — 71046 X-RAY EXAM CHEST 2 VIEWS: CPT

## 2023-12-27 PROCEDURE — 81001 URINALYSIS AUTO W/SCOPE: CPT

## 2023-12-27 PROCEDURE — 85025 COMPLETE CBC W/AUTO DIFF WBC: CPT

## 2023-12-27 PROCEDURE — 80053 COMPREHEN METABOLIC PANEL: CPT

## 2023-12-27 NOTE — PERIOP NOTE
[address] INSTRUCTIONS    Surgery Date:   1/10/23    Your surgeon's office or [facility] staff will call you between 4 PM- 8 PM the day before surgery with your arrival time. If your surgery is on a Monday, you will receive a call the preceding Friday. If your surgeon;s office has given you arrival time, then go by that time. Please report to [facility] Patient Access/Admitting on the 1st floor. Bring your insurance card, photo identification, and any copayment ( if applicable). If you are going home the same day of your surgery, you must have a responsible adult to drive you home. You need to have a responsible adult to stay with you the first 24 hours after surgery and you should not drive a car for 24 hours following your surgery. If you are being admitted to the hospital, please leave personal belongings/luggage in your car until you have an assigned hospital room number. Nothing to eat or drink after midnight the night before surgery. This includes no water, gum, mints, coffee, juice, etc.  Please note special instructions, if applicable, below for medications. Do NOT drink alcohol or smoke 24 hours before surgery. STOP smoking for 14 days prior as it helps with breathing and healing after surgery. Please wear comfortable clothes. Wear glasses instead of contacts. We ask that all money and jewelry and valuables to be left at home. Wear no makeup, particularly mascara the day of surgery. All body piercings, rings, and jewelry need to be removed and left at home. Remove all nail polish except for clear. Please wear your hair loose or down. Please no pony-tails, buns, or any metal hair accessories. You may wear deodorant, unless having breast surgery. Do not shave any body area within 24 hours of your surgery. Please follow all instructions to avoid any potential surgical cancellation.   Should your physical condition change, (i.e. fever, cold, flu, etc.) please notify your

## 2023-12-27 NOTE — PERIOP NOTE
Surgical consent obtained and signed by patient. Patient instructed to obtain chest X-ray at [address] upon leaving PAT department.       CHLOE IN OR NOTIFIED OF WEIGHT: 313 LBS

## 2023-12-28 LAB
BACTERIA SPEC CULT: NORMAL
BACTERIA SPEC CULT: NORMAL
SERVICE CMNT-IMP: NORMAL

## 2023-12-29 NOTE — PERIOP NOTE
PT FOUND TO BE NEGATIVE FOR MRSA POSITIVE FOR MSSA WILL GIVE INFORMATION TO [place] NP TO HAVE PT TREATED

## 2024-01-08 ENCOUNTER — ANESTHESIA EVENT (OUTPATIENT)
Facility: HOSPITAL | Age: 49
End: 2024-01-08
Payer: MEDICAID

## 2024-01-09 NOTE — ANESTHESIA PRE PROCEDURE
Department of Anesthesiology  Preprocedure Note       Name:  Tevin Velazquez   Age:  48 y.o.  :  1975                                          MRN:  [id number]         Date:  2024      Surgeon: Surgeon(s):  Jose Luis Araiza MD    Procedure: Procedure(s):  DIRECT VISUAL INTERNAL URETHROTOMY, TRANSURETHRAL RESECTION BLADDER TUMOR    Medications prior to admission:   Prior to Admission medications    Not on File       Current medications:    No current facility-administered medications for this encounter.     No current outpatient medications on file.       Allergies:  No Known Allergies    Problem List:    Patient Active Problem List   Diagnosis Code   • SIRS (systemic inflammatory response syndrome) ([facility]) R65.10   • Malignant neoplasm of overlapping sites of bladder ([facility]) C67.8   • Hematuria R31.9   • Leukocytosis D72.829   • Sepsis due to methicillin susceptible Staphylococcus aureus (MSSA) without acute organ dysfunction ([facility]) A41.01   • Complicated UTI (urinary tract infection) N39.0   • Cellulitis of right lower extremity L03.115   • Pneumonia of both lungs due to infectious organism J18.9       Past Medical History:        Diagnosis Date   • Adverse anesthesia outcome     PT STATES \"LOTS OF FLUID ON THE LUNGS\" AFTER SURGERY IN 2023 - HAD TO STAY IN [facility] 10 DAYS BECAUSE OF IT.   • Cancer ([facility]) 10/2023    BLADDER   • MRSA infection     LEFT HAND       Past Surgical History:        Procedure Laterality Date   • ANTERIOR CRUCIATE LIGAMENT REPAIR Left    • CYSTOSCOPY N/A 10/12/2023    CYSTOURETHROSCOPY, POSSIBLE TRANSURETHRAL RESECTION OF LARGE BLADDER CANCER performed by Grant Joseph MD at [facility] MAIN OR   • HAND RECONSTRUCTION Left    • TONSILLECTOMY      CHILDHOOD       Social History:    Social History     Tobacco Use   • Smoking status: Former     Types: Cigarettes   • Smokeless tobacco: Not on file   Substance Use Topics   • Alcohol use: No                                Counseling

## 2024-01-10 ENCOUNTER — HOSPITAL ENCOUNTER (INPATIENT)
Facility: HOSPITAL | Age: 49
LOS: 1 days | Discharge: HOME | End: 2024-01-11
Attending: UROLOGY | Admitting: UROLOGY
Payer: MEDICAID

## 2024-01-10 ENCOUNTER — ANESTHESIA (OUTPATIENT)
Facility: HOSPITAL | Age: 49
End: 2024-01-10
Payer: MEDICAID

## 2024-01-10 DIAGNOSIS — Z41.9 SURGERY, ELECTIVE: Primary | ICD-10-CM

## 2024-01-10 LAB
ANION GAP SERPL CALC-SCNC: 6 MMOL/L (ref 5–15)
BUN SERPL-MCNC: 17 MG/DL (ref 6–20)
BUN/CREAT SERPL: 14 (ref 12–20)
CALCIUM SERPL-MCNC: 8.6 MG/DL (ref 8.5–10.1)
CHLORIDE SERPL-SCNC: 108 MMOL/L (ref 97–108)
CO2 SERPL-SCNC: 25 MMOL/L (ref 21–32)
CREAT SERPL-MCNC: 1.22 MG/DL (ref 0.7–1.3)
GLUCOSE SERPL-MCNC: 119 MG/DL (ref 65–100)
HCT VFR BLD AUTO: 41.6 % (ref 36.6–50.3)
HGB BLD-MCNC: 14 G/DL (ref 12.1–17)
POTASSIUM SERPL-SCNC: 4.2 MMOL/L (ref 3.5–5.1)
SODIUM SERPL-SCNC: 139 MMOL/L (ref 136–145)

## 2024-01-10 PROCEDURE — 2500000003 HC RX 250 WO HCPCS: Performed by: NURSE ANESTHETIST, CERTIFIED REGISTERED

## 2024-01-10 PROCEDURE — 6360000002 HC RX W HCPCS: Performed by: NURSE ANESTHETIST, CERTIFIED REGISTERED

## 2024-01-10 PROCEDURE — 3600000003 HC SURGERY LEVEL 3 BASE: Performed by: UROLOGY

## 2024-01-10 PROCEDURE — 2709999900 HC NON-CHARGEABLE SUPPLY: Performed by: UROLOGY

## 2024-01-10 PROCEDURE — 6360000002 HC RX W HCPCS: Performed by: STUDENT IN AN ORGANIZED HEALTH CARE EDUCATION/TRAINING PROGRAM

## 2024-01-10 PROCEDURE — 3600000013 HC SURGERY LEVEL 3 ADDTL 15MIN: Performed by: UROLOGY

## 2024-01-10 PROCEDURE — 7100000000 HC PACU RECOVERY - FIRST 15 MIN: Performed by: UROLOGY

## 2024-01-10 PROCEDURE — 0TBB8ZZ EXCISION OF BLADDER, VIA NATURAL OR ARTIFICIAL OPENING ENDOSCOPIC: ICD-10-PCS | Performed by: UROLOGY

## 2024-01-10 PROCEDURE — 3700000000 HC ANESTHESIA ATTENDED CARE: Performed by: UROLOGY

## 2024-01-10 PROCEDURE — 85018 HEMOGLOBIN: CPT

## 2024-01-10 PROCEDURE — 88307 TISSUE EXAM BY PATHOLOGIST: CPT

## 2024-01-10 PROCEDURE — 36415 COLL VENOUS BLD VENIPUNCTURE: CPT

## 2024-01-10 PROCEDURE — 2580000003 HC RX 258: Performed by: UROLOGY

## 2024-01-10 PROCEDURE — 80048 BASIC METABOLIC PNL TOTAL CA: CPT

## 2024-01-10 PROCEDURE — 7100000001 HC PACU RECOVERY - ADDTL 15 MIN: Performed by: UROLOGY

## 2024-01-10 PROCEDURE — 85014 HEMATOCRIT: CPT

## 2024-01-10 PROCEDURE — 6370000000 HC RX 637 (ALT 250 FOR IP): Performed by: UROLOGY

## 2024-01-10 PROCEDURE — 2580000003 HC RX 258: Performed by: STUDENT IN AN ORGANIZED HEALTH CARE EDUCATION/TRAINING PROGRAM

## 2024-01-10 PROCEDURE — 6360000002 HC RX W HCPCS: Performed by: UROLOGY

## 2024-01-10 PROCEDURE — 0T5B8ZZ DESTRUCTION OF BLADDER, VIA NATURAL OR ARTIFICIAL OPENING ENDOSCOPIC: ICD-10-PCS | Performed by: UROLOGY

## 2024-01-10 PROCEDURE — 3700000001 HC ADD 15 MINUTES (ANESTHESIA): Performed by: UROLOGY

## 2024-01-10 PROCEDURE — 6370000000 HC RX 637 (ALT 250 FOR IP): Performed by: NURSE PRACTITIONER

## 2024-01-10 PROCEDURE — 1100000000 HC RM PRIVATE

## 2024-01-10 RX ORDER — SODIUM CHLORIDE 0.9 % (FLUSH) 0.9 %
5-40 SYRINGE (ML) INJECTION EVERY 12 HOURS SCHEDULED
Status: DISCONTINUED | OUTPATIENT
Start: 2024-01-10 | End: 2024-01-11 | Stop reason: HOSPADM

## 2024-01-10 RX ORDER — PROPOFOL 10 MG/ML
INJECTION, EMULSION INTRAVENOUS PRN
Status: DISCONTINUED | OUTPATIENT
Start: 2024-01-10 | End: 2024-01-10 | Stop reason: SDUPTHER

## 2024-01-10 RX ORDER — LIDOCAINE HYDROCHLORIDE 20 MG/ML
JELLY TOPICAL PRN
Status: DISCONTINUED | OUTPATIENT
Start: 2024-01-10 | End: 2024-01-11 | Stop reason: HOSPADM

## 2024-01-10 RX ORDER — TRAMADOL HYDROCHLORIDE 50 MG/1
100 TABLET ORAL EVERY 6 HOURS PRN
Status: DISCONTINUED | OUTPATIENT
Start: 2024-01-10 | End: 2024-01-11 | Stop reason: HOSPADM

## 2024-01-10 RX ORDER — SODIUM CHLORIDE 9 MG/ML
INJECTION, SOLUTION INTRAVENOUS PRN
Status: DISCONTINUED | OUTPATIENT
Start: 2024-01-10 | End: 2024-01-11 | Stop reason: HOSPADM

## 2024-01-10 RX ORDER — TRAMADOL HYDROCHLORIDE 50 MG/1
50 TABLET ORAL EVERY 6 HOURS PRN
Status: DISCONTINUED | OUTPATIENT
Start: 2024-01-10 | End: 2024-01-11 | Stop reason: HOSPADM

## 2024-01-10 RX ORDER — SODIUM CHLORIDE 0.9 % (FLUSH) 0.9 %
5-40 SYRINGE (ML) INJECTION PRN
Status: DISCONTINUED | OUTPATIENT
Start: 2024-01-10 | End: 2024-01-10 | Stop reason: HOSPADM

## 2024-01-10 RX ORDER — OXYCODONE HYDROCHLORIDE 5 MG/1
5 TABLET ORAL
Status: DISCONTINUED | OUTPATIENT
Start: 2024-01-10 | End: 2024-01-10 | Stop reason: HOSPADM

## 2024-01-10 RX ORDER — SODIUM CHLORIDE, SODIUM LACTATE, POTASSIUM CHLORIDE, CALCIUM CHLORIDE 600; 310; 30; 20 MG/100ML; MG/100ML; MG/100ML; MG/100ML
INJECTION, SOLUTION INTRAVENOUS CONTINUOUS
Status: DISCONTINUED | OUTPATIENT
Start: 2024-01-10 | End: 2024-01-10 | Stop reason: HOSPADM

## 2024-01-10 RX ORDER — ONDANSETRON 2 MG/ML
INJECTION INTRAMUSCULAR; INTRAVENOUS PRN
Status: DISCONTINUED | OUTPATIENT
Start: 2024-01-10 | End: 2024-01-10 | Stop reason: SDUPTHER

## 2024-01-10 RX ORDER — ONDANSETRON 2 MG/ML
4 INJECTION INTRAMUSCULAR; INTRAVENOUS EVERY 6 HOURS PRN
Status: DISCONTINUED | OUTPATIENT
Start: 2024-01-10 | End: 2024-01-11 | Stop reason: HOSPADM

## 2024-01-10 RX ORDER — LIDOCAINE HYDROCHLORIDE 10 MG/ML
1 INJECTION, SOLUTION EPIDURAL; INFILTRATION; INTRACAUDAL; PERINEURAL
Status: DISCONTINUED | OUTPATIENT
Start: 2024-01-10 | End: 2024-01-10 | Stop reason: HOSPADM

## 2024-01-10 RX ORDER — SODIUM CHLORIDE 0.9 % (FLUSH) 0.9 %
5-40 SYRINGE (ML) INJECTION EVERY 12 HOURS SCHEDULED
Status: DISCONTINUED | OUTPATIENT
Start: 2024-01-10 | End: 2024-01-10 | Stop reason: HOSPADM

## 2024-01-10 RX ORDER — LIDOCAINE HYDROCHLORIDE 20 MG/ML
INJECTION, SOLUTION EPIDURAL; INFILTRATION; INTRACAUDAL; PERINEURAL PRN
Status: DISCONTINUED | OUTPATIENT
Start: 2024-01-10 | End: 2024-01-10 | Stop reason: SDUPTHER

## 2024-01-10 RX ORDER — ACETAMINOPHEN 500 MG
1000 TABLET ORAL ONCE
Status: DISCONTINUED | OUTPATIENT
Start: 2024-01-10 | End: 2024-01-10 | Stop reason: HOSPADM

## 2024-01-10 RX ORDER — FENTANYL CITRATE 50 UG/ML
100 INJECTION, SOLUTION INTRAMUSCULAR; INTRAVENOUS
Status: DISCONTINUED | OUTPATIENT
Start: 2024-01-10 | End: 2024-01-10 | Stop reason: HOSPADM

## 2024-01-10 RX ORDER — PROCHLORPERAZINE EDISYLATE 5 MG/ML
5 INJECTION INTRAMUSCULAR; INTRAVENOUS
Status: DISCONTINUED | OUTPATIENT
Start: 2024-01-10 | End: 2024-01-10 | Stop reason: HOSPADM

## 2024-01-10 RX ORDER — MORPHINE SULFATE 2 MG/ML
2 INJECTION, SOLUTION INTRAMUSCULAR; INTRAVENOUS EVERY 4 HOURS PRN
Status: DISCONTINUED | OUTPATIENT
Start: 2024-01-10 | End: 2024-01-11 | Stop reason: HOSPADM

## 2024-01-10 RX ORDER — KETAMINE HCL IN NACL, ISO-OSM 100MG/10ML
SYRINGE (ML) INJECTION PRN
Status: DISCONTINUED | OUTPATIENT
Start: 2024-01-10 | End: 2024-01-10 | Stop reason: SDUPTHER

## 2024-01-10 RX ORDER — SODIUM CHLORIDE 9 MG/ML
INJECTION, SOLUTION INTRAVENOUS PRN
Status: DISCONTINUED | OUTPATIENT
Start: 2024-01-10 | End: 2024-01-10 | Stop reason: HOSPADM

## 2024-01-10 RX ORDER — FENTANYL CITRATE 50 UG/ML
25 INJECTION, SOLUTION INTRAMUSCULAR; INTRAVENOUS EVERY 5 MIN PRN
Status: DISCONTINUED | OUTPATIENT
Start: 2024-01-10 | End: 2024-01-10 | Stop reason: HOSPADM

## 2024-01-10 RX ORDER — HYDROMORPHONE HYDROCHLORIDE 2 MG/ML
INJECTION, SOLUTION INTRAMUSCULAR; INTRAVENOUS; SUBCUTANEOUS PRN
Status: DISCONTINUED | OUTPATIENT
Start: 2024-01-10 | End: 2024-01-10 | Stop reason: SDUPTHER

## 2024-01-10 RX ORDER — MIDAZOLAM HYDROCHLORIDE 1 MG/ML
INJECTION INTRAMUSCULAR; INTRAVENOUS PRN
Status: DISCONTINUED | OUTPATIENT
Start: 2024-01-10 | End: 2024-01-10 | Stop reason: SDUPTHER

## 2024-01-10 RX ORDER — FENTANYL CITRATE 50 UG/ML
INJECTION, SOLUTION INTRAMUSCULAR; INTRAVENOUS PRN
Status: DISCONTINUED | OUTPATIENT
Start: 2024-01-10 | End: 2024-01-10 | Stop reason: SDUPTHER

## 2024-01-10 RX ORDER — ACETAMINOPHEN 500 MG
1000 TABLET ORAL EVERY 6 HOURS
Status: DISCONTINUED | OUTPATIENT
Start: 2024-01-10 | End: 2024-01-11 | Stop reason: HOSPADM

## 2024-01-10 RX ORDER — MIDAZOLAM HYDROCHLORIDE 2 MG/2ML
2 INJECTION, SOLUTION INTRAMUSCULAR; INTRAVENOUS
Status: DISCONTINUED | OUTPATIENT
Start: 2024-01-10 | End: 2024-01-10 | Stop reason: HOSPADM

## 2024-01-10 RX ORDER — ONDANSETRON 4 MG/1
4 TABLET, ORALLY DISINTEGRATING ORAL EVERY 8 HOURS PRN
Status: DISCONTINUED | OUTPATIENT
Start: 2024-01-10 | End: 2024-01-11 | Stop reason: HOSPADM

## 2024-01-10 RX ORDER — SODIUM CHLORIDE 0.9 % (FLUSH) 0.9 %
5-40 SYRINGE (ML) INJECTION PRN
Status: DISCONTINUED | OUTPATIENT
Start: 2024-01-10 | End: 2024-01-11 | Stop reason: HOSPADM

## 2024-01-10 RX ORDER — HYDROMORPHONE HYDROCHLORIDE 1 MG/ML
0.5 INJECTION, SOLUTION INTRAMUSCULAR; INTRAVENOUS; SUBCUTANEOUS EVERY 5 MIN PRN
Status: DISCONTINUED | OUTPATIENT
Start: 2024-01-10 | End: 2024-01-10 | Stop reason: HOSPADM

## 2024-01-10 RX ORDER — HYDRALAZINE HYDROCHLORIDE 20 MG/ML
10 INJECTION INTRAMUSCULAR; INTRAVENOUS
Status: DISCONTINUED | OUTPATIENT
Start: 2024-01-10 | End: 2024-01-10 | Stop reason: HOSPADM

## 2024-01-10 RX ORDER — DEXAMETHASONE SODIUM PHOSPHATE 4 MG/ML
INJECTION, SOLUTION INTRA-ARTICULAR; INTRALESIONAL; INTRAMUSCULAR; INTRAVENOUS; SOFT TISSUE PRN
Status: DISCONTINUED | OUTPATIENT
Start: 2024-01-10 | End: 2024-01-10 | Stop reason: SDUPTHER

## 2024-01-10 RX ORDER — SODIUM CHLORIDE, SODIUM LACTATE, POTASSIUM CHLORIDE, CALCIUM CHLORIDE 600; 310; 30; 20 MG/100ML; MG/100ML; MG/100ML; MG/100ML
INJECTION, SOLUTION INTRAVENOUS CONTINUOUS
Status: DISCONTINUED | OUTPATIENT
Start: 2024-01-10 | End: 2024-01-11 | Stop reason: HOSPADM

## 2024-01-10 RX ORDER — PHENAZOPYRIDINE HYDROCHLORIDE 100 MG/1
100 TABLET, FILM COATED ORAL 3 TIMES DAILY PRN
Status: DISCONTINUED | OUTPATIENT
Start: 2024-01-10 | End: 2024-01-11 | Stop reason: HOSPADM

## 2024-01-10 RX ORDER — ONDANSETRON 2 MG/ML
4 INJECTION INTRAMUSCULAR; INTRAVENOUS
Status: DISCONTINUED | OUTPATIENT
Start: 2024-01-10 | End: 2024-01-10 | Stop reason: HOSPADM

## 2024-01-10 RX ORDER — ROCURONIUM BROMIDE 10 MG/ML
INJECTION, SOLUTION INTRAVENOUS PRN
Status: DISCONTINUED | OUTPATIENT
Start: 2024-01-10 | End: 2024-01-10 | Stop reason: SDUPTHER

## 2024-01-10 RX ADMIN — HYDROMORPHONE HYDROCHLORIDE 0.5 MG: 2 INJECTION, SOLUTION INTRAMUSCULAR; INTRAVENOUS; SUBCUTANEOUS at 08:43

## 2024-01-10 RX ADMIN — SODIUM CHLORIDE, POTASSIUM CHLORIDE, SODIUM LACTATE AND CALCIUM CHLORIDE: 600; 310; 30; 20 INJECTION, SOLUTION INTRAVENOUS at 06:40

## 2024-01-10 RX ADMIN — LIDOCAINE HYDROCHLORIDE 100 MG: 20 INJECTION, SOLUTION EPIDURAL; INFILTRATION; INTRACAUDAL; PERINEURAL at 07:43

## 2024-01-10 RX ADMIN — ACETAMINOPHEN 1000 MG: 500 TABLET ORAL at 20:06

## 2024-01-10 RX ADMIN — SUGAMMADEX 625 MG: 100 INJECTION, SOLUTION INTRAVENOUS at 08:40

## 2024-01-10 RX ADMIN — SODIUM CHLORIDE, POTASSIUM CHLORIDE, SODIUM LACTATE AND CALCIUM CHLORIDE: 600; 310; 30; 20 INJECTION, SOLUTION INTRAVENOUS at 13:55

## 2024-01-10 RX ADMIN — FENTANYL CITRATE 25 MCG: 50 INJECTION INTRAMUSCULAR; INTRAVENOUS at 09:53

## 2024-01-10 RX ADMIN — ONDANSETRON 4 MG: 2 INJECTION INTRAMUSCULAR; INTRAVENOUS at 07:57

## 2024-01-10 RX ADMIN — SODIUM CHLORIDE, POTASSIUM CHLORIDE, SODIUM LACTATE AND CALCIUM CHLORIDE: 600; 310; 30; 20 INJECTION, SOLUTION INTRAVENOUS at 09:35

## 2024-01-10 RX ADMIN — SODIUM CHLORIDE 3000 MG: 900 INJECTION INTRAVENOUS at 07:48

## 2024-01-10 RX ADMIN — ROCURONIUM BROMIDE 45 MG: 10 INJECTION, SOLUTION INTRAVENOUS at 07:48

## 2024-01-10 RX ADMIN — FENTANYL CITRATE 100 MCG: 50 INJECTION, SOLUTION INTRAMUSCULAR; INTRAVENOUS at 07:43

## 2024-01-10 RX ADMIN — TRAMADOL HYDROCHLORIDE 100 MG: 50 TABLET ORAL at 18:00

## 2024-01-10 RX ADMIN — ROCURONIUM BROMIDE 5 MG: 10 INJECTION, SOLUTION INTRAVENOUS at 07:43

## 2024-01-10 RX ADMIN — HYDROMORPHONE HYDROCHLORIDE 0.5 MG: 2 INJECTION, SOLUTION INTRAMUSCULAR; INTRAVENOUS; SUBCUTANEOUS at 08:48

## 2024-01-10 RX ADMIN — TRAMADOL HYDROCHLORIDE 100 MG: 50 TABLET ORAL at 12:47

## 2024-01-10 RX ADMIN — HYDROMORPHONE HYDROCHLORIDE 0.5 MG: 2 INJECTION, SOLUTION INTRAMUSCULAR; INTRAVENOUS; SUBCUTANEOUS at 08:37

## 2024-01-10 RX ADMIN — PROPOFOL 200 MG: 10 INJECTION, EMULSION INTRAVENOUS at 07:43

## 2024-01-10 RX ADMIN — MIDAZOLAM HYDROCHLORIDE 160 MG: 1 INJECTION, SOLUTION INTRAMUSCULAR; INTRAVENOUS at 07:43

## 2024-01-10 RX ADMIN — FENTANYL CITRATE 25 MCG: 50 INJECTION INTRAMUSCULAR; INTRAVENOUS at 09:39

## 2024-01-10 RX ADMIN — PHENAZOPYRIDINE HYDROCHLORIDE 100 MG: 100 TABLET ORAL at 18:00

## 2024-01-10 RX ADMIN — Medication 20 MG: at 07:56

## 2024-01-10 RX ADMIN — LIDOCAINE HYDROCHLORIDE: 20 JELLY TOPICAL at 15:55

## 2024-01-10 RX ADMIN — TRAMADOL HYDROCHLORIDE 100 MG: 50 TABLET ORAL at 23:38

## 2024-01-10 RX ADMIN — SODIUM CHLORIDE, PRESERVATIVE FREE 10 ML: 5 INJECTION INTRAVENOUS at 21:04

## 2024-01-10 RX ADMIN — MIDAZOLAM HYDROCHLORIDE 2 MG: 1 INJECTION, SOLUTION INTRAMUSCULAR; INTRAVENOUS at 07:26

## 2024-01-10 RX ADMIN — DEXAMETHASONE SODIUM PHOSPHATE 4 MG: 4 INJECTION INTRA-ARTICULAR; INTRALESIONAL; INTRAMUSCULAR; INTRAVENOUS; SOFT TISSUE at 07:57

## 2024-01-10 RX ADMIN — ACETAMINOPHEN 1000 MG: 500 TABLET ORAL at 12:48

## 2024-01-10 ASSESSMENT — PAIN DESCRIPTION - LOCATION
LOCATION: PENIS

## 2024-01-10 ASSESSMENT — PAIN DESCRIPTION - DESCRIPTORS
DESCRIPTORS: ACHING
DESCRIPTORS: ACHING;DISCOMFORT
DESCRIPTORS: SORE
DESCRIPTORS: ACHING

## 2024-01-10 ASSESSMENT — PAIN SCALES - GENERAL
PAINLEVEL_OUTOF10: 10
PAINLEVEL_OUTOF10: 5
PAINLEVEL_OUTOF10: 8
PAINLEVEL_OUTOF10: 6
PAINLEVEL_OUTOF10: 6
PAINLEVEL_OUTOF10: 9

## 2024-01-10 ASSESSMENT — PAIN - FUNCTIONAL ASSESSMENT: PAIN_FUNCTIONAL_ASSESSMENT: 0-10

## 2024-01-10 ASSESSMENT — PAIN DESCRIPTION - ORIENTATION: ORIENTATION: OUTER

## 2024-01-10 NOTE — OP NOTE
[facility]  OPERATIVE REPORT    Name:  YENNI WARD  MR#:  [id number]  :  1975  ACCOUNT #:  [id number]  DATE OF SERVICE:  01/10/2024    PREOPERATIVE DIAGNOSES:  Bladder cancer, urethral stricture.    POSTOPERATIVE DIAGNOSES:  Bladder cancer, urethral stricture.    PROCEDURES PERFORMED:  Urethral dilation, urethrotomy, transurethral resection of large bladder tumor, aggregate size greater than 6 cm.    SURGEON:  Jose Luis Araiza MD    ASSISTANT:  None.    ANESTHESIA:  General.    COMPLICATIONS:  None.    SPECIMENS REMOVED:  ***.    IMPLANTS:  None.    ESTIMATED BLOOD LOSS:  Less than 100 mL.    INDICATIONS/BRIEF SUMMARY:  This gentleman had undergone a transurethral resection at another facility and because of equipment failure and other technical issues, only a portion of the tumor was able to be resected.  The patient developed an urethral stricture and then he could not undergo an outpatient local cystoscopy.    PROCEDURE:  After anesthesia, the patient was prepped and draped in the lithotomy position.  Because of problems with previous anesthesia, he was intubated under direct vision.  The cystoscope would not pass through the fossa navicularis and the fossa navicularis was dilated with short sounds to 22-Yemeni and then the 21-Yemeni cystoscope was passed through the urethra.  The bladder was carefully examined.  There were multiple papillary tumors scattered throughout the bladder.  There were too numerous to count tumors and they were diffuse.  There were large tumors involving the posterior wall and floor of the bladder and the lateral walls and multiple tumors involving the dome extending to the bladder neck, but not involving the prostatic urethra.  The left ureteral orifice appeared to have been previously resected and there was a wide open well-healed ureteral orifice, which had been moved more cephalad having been resected at the level of the trigone.  There were some

## 2024-01-10 NOTE — BRIEF OP NOTE
Brief Postoperative Note      Patient: Tvein Velazquez  YOB: 1975  MRN: [id number]    Date of Procedure: 1/10/2024    Pre-Op Diagnosis Codes:     * Malignant neoplasm of urinary bladder, unspecified site (HCC) [C67.9]    Post-Op Diagnosis: Same       Procedure(s):  URETHRAL DILATION, URETHROTOMY,  TRANSURETHRAL RESECTION BLADDER TUMOR    Surgeon(s):  Jose Luis Araiza MD    Assistant:  * No surgical staff found *    Anesthesia: General    Estimated Blood Loss (mL): less than 100     Complications: None    Specimens:   ID Type Source Tests Collected by Time Destination   1 : BLADDER TUMOR Tissue Bladder SURGICAL PATHOLOGY Jose Luis Araiza MD 1/10/2024 0832        Implants:  * No implants in log *      Drains:   Urinary Catheter 01/10/24 3 Way (Active)   $ Urethral catheter insertion Inserted for procedure 01/10/24 0836   Catheter Indications Perioperative use for selected surgical procedures 01/10/24 0836   Collection Container Standard 01/10/24 0836       Findings: stricture distal urethra multiple TNTC papillary bladder tumors thruout bladder.  Aggregate size over 6 cm.        Electronically signed by Jose Luis Araiza MD on 1/10/2024 at 8:44 AM  
Unknown if ever smoked

## 2024-01-10 NOTE — PERIOP NOTE
TRANSFER - OUT REPORT:    Verbal report given to TOMÁS Lr on Tevin Velazquez  being transferred to [facility] for routine post-op       Report consisted of patient's Situation, Background, Assessment and   Recommendations(SBAR).     Information from the following report(s) Nurse Handoff Report, Adult Overview, Surgery Report, MAR, and Recent Results was reviewed with the receiving nurse.           Lines:   Peripheral IV 01/10/24 Right Antecubital (Active)   Site Assessment Clean, dry & intact 01/10/24 0905   Line Status Infusing 01/10/24 0905   Line Care Connections checked and tightened 01/10/24 0905   Phlebitis Assessment No symptoms 01/10/24 0905   Infiltration Assessment 0 01/10/24 0905   Alcohol Cap Used Yes 01/10/24 0905   Dressing Status Clean, dry & intact 01/10/24 0905   Dressing Type Transparent 01/10/24 0905   Dressing Intervention New 01/10/24 0644        Opportunity for questions and clarification was provided.

## 2024-01-11 VITALS
BODY MASS INDEX: 40.17 KG/M2 | DIASTOLIC BLOOD PRESSURE: 79 MMHG | HEIGHT: 74 IN | TEMPERATURE: 98.2 F | SYSTOLIC BLOOD PRESSURE: 135 MMHG | OXYGEN SATURATION: 97 % | RESPIRATION RATE: 18 BRPM | HEART RATE: 70 BPM | WEIGHT: 313 LBS

## 2024-01-11 LAB
ANION GAP SERPL CALC-SCNC: 5 MMOL/L (ref 5–15)
BUN SERPL-MCNC: 15 MG/DL (ref 6–20)
BUN/CREAT SERPL: 20 (ref 12–20)
CALCIUM SERPL-MCNC: 8.3 MG/DL (ref 8.5–10.1)
CHLORIDE SERPL-SCNC: 107 MMOL/L (ref 97–108)
CO2 SERPL-SCNC: 24 MMOL/L (ref 21–32)
CREAT SERPL-MCNC: 0.76 MG/DL (ref 0.7–1.3)
GLUCOSE BLD STRIP.AUTO-MCNC: 112 MG/DL (ref 65–117)
GLUCOSE SERPL-MCNC: 105 MG/DL (ref 65–100)
HCT VFR BLD AUTO: 39.3 % (ref 36.6–50.3)
HGB BLD-MCNC: 13.7 G/DL (ref 12.1–17)
POTASSIUM SERPL-SCNC: 3.9 MMOL/L (ref 3.5–5.1)
SERVICE CMNT-IMP: NORMAL
SODIUM SERPL-SCNC: 136 MMOL/L (ref 136–145)

## 2024-01-11 PROCEDURE — 6370000000 HC RX 637 (ALT 250 FOR IP): Performed by: UROLOGY

## 2024-01-11 PROCEDURE — 82962 GLUCOSE BLOOD TEST: CPT

## 2024-01-11 PROCEDURE — 85018 HEMOGLOBIN: CPT

## 2024-01-11 PROCEDURE — 80048 BASIC METABOLIC PNL TOTAL CA: CPT

## 2024-01-11 PROCEDURE — 36415 COLL VENOUS BLD VENIPUNCTURE: CPT

## 2024-01-11 PROCEDURE — 85014 HEMATOCRIT: CPT

## 2024-01-11 PROCEDURE — 2580000003 HC RX 258: Performed by: UROLOGY

## 2024-01-11 RX ORDER — TRAMADOL HYDROCHLORIDE 50 MG/1
50 TABLET ORAL EVERY 6 HOURS PRN
Qty: 20 TABLET | Refills: 0 | Status: SHIPPED | OUTPATIENT
Start: 2024-01-11 | End: 2024-01-16

## 2024-01-11 RX ADMIN — ACETAMINOPHEN 1000 MG: 500 TABLET ORAL at 09:20

## 2024-01-11 RX ADMIN — SODIUM CHLORIDE, POTASSIUM CHLORIDE, SODIUM LACTATE AND CALCIUM CHLORIDE: 600; 310; 30; 20 INJECTION, SOLUTION INTRAVENOUS at 03:01

## 2024-01-11 RX ADMIN — SODIUM CHLORIDE, PRESERVATIVE FREE 10 ML: 5 INJECTION INTRAVENOUS at 09:21

## 2024-01-11 RX ADMIN — TRAMADOL HYDROCHLORIDE 100 MG: 50 TABLET ORAL at 09:20

## 2024-01-11 RX ADMIN — ACETAMINOPHEN 1000 MG: 500 TABLET ORAL at 02:34

## 2024-01-11 ASSESSMENT — PAIN SCALES - GENERAL: PAINLEVEL_OUTOF10: 8

## 2024-01-11 ASSESSMENT — PAIN DESCRIPTION - DESCRIPTORS: DESCRIPTORS: THROBBING

## 2024-01-11 ASSESSMENT — PAIN DESCRIPTION - LOCATION: LOCATION: ABDOMEN

## 2024-01-11 NOTE — ANESTHESIA POSTPROCEDURE EVALUATION
Department of Anesthesiology  Postprocedure Note    Patient: Tevin Velazquez  MRN: [id number]  YOB: 1975  Date of evaluation: 1/10/2024    Procedure Summary     Date: 01/10/24 Room / Location: [facility] MAIN OR [address] [place] [address] MAIN OR    Anesthesia Start: 0726 Anesthesia Stop: 0902    Procedure: URETHRAL DILATION, URETHROTOMY,  TRANSURETHRAL RESECTION BLADDER TUMOR (Bladder) Diagnosis:       Malignant neoplasm of urinary bladder, unspecified site (HCC)      (Malignant neoplasm of urinary bladder, unspecified site (HCC) [C67.9])    Providers: Jose Luis Araiza MD Responsible Provider: Vidya Quiroz DO    Anesthesia Type: General ASA Status: 3          Anesthesia Type: General    Lul Phase I: Lul Score: 9    Lul Phase II:      Anesthesia Post Evaluation    Patient location during evaluation: PACU  Patient participation: complete - patient participated  Level of consciousness: awake and alert  Pain score: 0  Airway patency: patent  Nausea & Vomiting: no nausea and no vomiting  Cardiovascular status: blood pressure returned to baseline and hemodynamically stable  Respiratory status: room air  Hydration status: euvolemic  Multimodal analgesia pain management approach  Pain management: satisfactory to patient and adequate        No notable events documented.

## 2024-01-11 NOTE — DISCHARGE INSTRUCTIONS
Patient Discharge Instructions    Tevin [facility] / [id number] : 1975    Admitted 1/10/2024 Discharged: 2024     Take Home Medications       It is important that you take the medication exactly as they are prescribed.   Keep your medication in the bottles provided by the pharmacist and keep a list of the medication names, dosages, and times to be taken in your wallet.   Do not take other medications without consulting your doctor.       What to do at Home      Recommended activity: No Lifting for 6 weeks.      Follow-up with [facility]. Call for an appointment (if not already scheduled)            [phone].        Information obtained by :  I understand that if any problems occur once I am at home I am to contact my physician.    I understand and acknowledge receipt of the instructions indicated above.                                                                                                                                           Physician's or R.N.'s Signature                                                                  Date/Time                                                                                                                                              Patient or Representative Signature                                                          Date/Time

## 2024-01-11 NOTE — DISCHARGE SUMMARY
Discharge Summary     Patient ID:  Tevin Velazquez  [id number]   48 y.o.  1975    Admit date: 1/10/2024    Discharge Date: 1/11/2024      Admitting Physician: Jose Luis Araiza MD     Discharge Physician: Jose Luis Araiza MD    Admission Diagnoses: Malignant neoplasm of urinary bladder, unspecified site (HCC) [C67.9]  Surgery, elective [Z41.9]    Last Procedure: Procedure(s):  URETHRAL DILATION, URETHROTOMY,  TRANSURETHRAL RESECTION BLADDER TUMOR    Discharge Diagnoses: Principal Problem:    Surgery, elective  Resolved Problems:    * No resolved hospital problems. *        Medication List        START taking these medications      traMADol 50 MG tablet  Commonly known as: Ultram  Take 1 tablet by mouth every 6 hours as needed for Pain for up to 5 days. Intended supply: 5 days. Take lowest dose possible to manage pain Max Daily Amount: 200 mg               Where to Get Your Medications        These medications were sent to [address] -  [phone] - F [phone]  [address] [place] [address]      Phone: [phone]   traMADol 50 MG tablet          Consults: none    Significant Diagnostic Studies:      Hospital Course:   Normal hospital course for this procedure.    Disposition: Home    Patient Instructions:   There are no discharge medications for this patient.      Diet: Reference my discharge instructions.    Activity: Reference my discharge instructions.    No follow-ups on file.     Total time discharging patient took greater than 30 minutes.    Signed:  Jose Luis Araiza MD  January 11, 2024  6:48 AM

## 2024-01-11 NOTE — PLAN OF CARE
Problem: Pain  Goal: Verbalizes/displays adequate comfort level or baseline comfort level  Outcome: Progressing     Problem: Safety - Adult  Goal: Free from fall injury  Outcome: Progressing     Problem: Discharge Planning  Goal: Discharge to home or other facility with appropriate resources  Outcome: Progressing

## 2024-01-11 NOTE — PROGRESS NOTES
Vss af abd soft. Urine clear on slow cbi.  Passing flatus.  Lab good .  Ok for dc w walker in place.

## 2024-01-11 NOTE — PROGRESS NOTES
I have reviewed discharge instructions with the patient. Overnight walker catheter bag and leg bag teaching was provided by the RN. Patient was given opportunity to ask questions. Discharge medications were reviewed with the patient and appropriate educational materials and side effects teaching were provided to the patient. The patient verbalized understanding and had no further questions. IV was removed and patient was discharged home with family.      Medication List        START taking these medications      traMADol 50 MG tablet  Commonly known as: Ultram  Take 1 tablet by mouth every 6 hours as needed for Pain for up to 5 days. Intended supply: 5 days. Take lowest dose possible to manage pain Max Daily Amount: 200 mg               Where to Get Your Medications        These medications were sent to [facility] - [place], [place] - [address] [place] [address] - P [phone] - F [phone]  [address] [place] [address]      Phone: [phone]   traMADol 50 MG tablet

## 2024-02-01 ENCOUNTER — OFFICE VISIT (OUTPATIENT)
Age: 49
End: 2024-02-01
Payer: MEDICAID

## 2024-02-01 VITALS
BODY MASS INDEX: 38.27 KG/M2 | WEIGHT: 298.2 LBS | HEART RATE: 102 BPM | HEIGHT: 74 IN | OXYGEN SATURATION: 97 % | DIASTOLIC BLOOD PRESSURE: 84 MMHG | RESPIRATION RATE: 16 BRPM | TEMPERATURE: 99.1 F | SYSTOLIC BLOOD PRESSURE: 124 MMHG

## 2024-02-01 DIAGNOSIS — K42.9 UMBILICAL HERNIA WITHOUT OBSTRUCTION AND WITHOUT GANGRENE: Primary | ICD-10-CM

## 2024-02-01 PROCEDURE — 99214 OFFICE O/P EST MOD 30 MIN: CPT | Performed by: SURGERY

## 2024-02-01 ASSESSMENT — PATIENT HEALTH QUESTIONNAIRE - PHQ9
SUM OF ALL RESPONSES TO PHQ QUESTIONS 1-9: 0
1. LITTLE INTEREST OR PLEASURE IN DOING THINGS: 0
SUM OF ALL RESPONSES TO PHQ QUESTIONS 1-9: 0
SUM OF ALL RESPONSES TO PHQ QUESTIONS 1-9: 0
2. FEELING DOWN, DEPRESSED OR HOPELESS: 0
SUM OF ALL RESPONSES TO PHQ QUESTIONS 1-9: 0
SUM OF ALL RESPONSES TO PHQ9 QUESTIONS 1 & 2: 0

## 2024-02-01 NOTE — PROGRESS NOTES
Identified pt with two pt identifiers (name and ). Reviewed chart in preparation for visit and have obtained necessary documentation.    Tevin Velazquez is a 48 y.o. male  Chief Complaint   Patient presents with    Follow-up     /84 (Site: Left Upper Arm, Position: Sitting, Cuff Size: Large Adult)   Pulse (!) 102   Temp 99.1 °F (37.3 °C) (Temporal)   Resp 16   Ht 1.88 m (6' 2\")   Wt 135.3 kg (298 lb 3.2 oz)   SpO2 97%   BMI 38.29 kg/m²     1. Have you been to the ER, urgent care clinic since your last visit?  Hospitalized since your last visit?no    2. Have you seen or consulted any other health care providers outside of the [facility] since your last visit?  Include any pap smears or colon screening. no

## 2024-02-02 ASSESSMENT — ENCOUNTER SYMPTOMS
SORE THROAT: 0
WHEEZING: 0
EYE REDNESS: 0
ABDOMINAL PAIN: 0
VOMITING: 0
SHORTNESS OF BREATH: 0
NAUSEA: 0
COUGH: 0
BACK PAIN: 0

## 2024-02-03 NOTE — H&P (VIEW-ONLY)
[facility]  Annie Katz, DO  [address], [place] [address]  [phone]    General Surgery Follow Up    Patient Name: Tevin Velazquez (48 y.o., male)    PCP: No primary care provider on file.       Subjective:      Tevin Velazquez is a 48 y.o. male presents for follow up regarding his umbilical hernia.  He was originally scheduled to undergo robotic assisted repair of umbilical hernia on 10/25/2023.  He was unfortunately admitted to the hospital on 10/10/2023 with findings of SIRS/sepsis from possible right lower leg cellulitis.  Subsequent imaging of the abdomen demonstrated a large bladder mass.  He was seen by urology and underwent transurethral resection of a large bladder tumor but not all of the tumor was able to be excised.  After this procedure, the patient was transferred to the ICU as he developed pleural effusions and hypoxic respiratory failure. He has since recovered and is following up outpatient with cardiology for cardiac PET and with urology for additional intervention for the bladder tumor.     He underwent cystoscopy with additional bladder tumor resection in early January and will not require additional therapy for this at this time so he is ready to proceed with hernia repair.    Past Medical History:   Diagnosis Date    Adverse anesthesia outcome     PT STATES \"LOTS OF FLUID ON THE LUNGS\" AFTER SURGERY IN NOV 2023 - HAD TO STAY IN [facility] 10 DAYS BECAUSE OF IT.    Cancer (HCC) 10/2023    BLADDER    MRSA infection 1987    LEFT HAND       Past Surgical History:   Procedure Laterality Date    ANTERIOR CRUCIATE LIGAMENT REPAIR Left 2000    CYSTOSCOPY N/A 10/12/2023    CYSTOURETHROSCOPY, POSSIBLE TRANSURETHRAL RESECTION OF LARGE BLADDER CANCER performed by Grant Joseph MD at [facility] OR    CYSTOSCOPY N/A 1/10/2024    URETHRAL DILATION, URETHROTOMY,  TRANSURETHRAL RESECTION BLADDER TUMOR performed by Jose Luis Araiza MD at [facility] OR

## 2024-02-03 NOTE — PROGRESS NOTES
[facility]  Annie Katz, DO  [address], [place] [address]  [phone]    General Surgery Follow Up    Patient Name: Tevin Velazquez (48 y.o., male)    PCP: No primary care provider on file.       Subjective:      Tevin Velazquez is a 48 y.o. male presents for follow up regarding his umbilical hernia.  He was originally scheduled to undergo robotic assisted repair of umbilical hernia on 10/25/2023.  He was unfortunately admitted to the hospital on 10/10/2023 with findings of SIRS/sepsis from possible right lower leg cellulitis.  Subsequent imaging of the abdomen demonstrated a large bladder mass.  He was seen by urology and underwent transurethral resection of a large bladder tumor but not all of the tumor was able to be excised.  After this procedure, the patient was transferred to the ICU as he developed pleural effusions and hypoxic respiratory failure. He has since recovered and is following up outpatient with cardiology for cardiac PET and with urology for additional intervention for the bladder tumor.     He underwent cystoscopy with additional bladder tumor resection in early January and will not require additional therapy for this at this time so he is ready to proceed with hernia repair.    Past Medical History:   Diagnosis Date    Adverse anesthesia outcome     PT STATES \"LOTS OF FLUID ON THE LUNGS\" AFTER SURGERY IN NOV 2023 - HAD TO STAY IN [facility] 10 DAYS BECAUSE OF IT.    Cancer (HCC) 10/2023    BLADDER    MRSA infection 1987    LEFT HAND       Past Surgical History:   Procedure Laterality Date    ANTERIOR CRUCIATE LIGAMENT REPAIR Left 2000    CYSTOSCOPY N/A 10/12/2023    CYSTOURETHROSCOPY, POSSIBLE TRANSURETHRAL RESECTION OF LARGE BLADDER CANCER performed by Grant Joseph MD at [facility] OR    CYSTOSCOPY N/A 1/10/2024    URETHRAL DILATION, URETHROTOMY,  TRANSURETHRAL RESECTION BLADDER TUMOR performed by Jose Luis Araiza MD at [facility] OR

## 2024-02-05 ENCOUNTER — TELEPHONE (OUTPATIENT)
Age: 49
End: 2024-02-05

## 2024-02-06 ENCOUNTER — PREP FOR PROCEDURE (OUTPATIENT)
Age: 49
End: 2024-02-06

## 2024-02-06 ENCOUNTER — TELEPHONE (OUTPATIENT)
Age: 49
End: 2024-02-06

## 2024-02-06 DIAGNOSIS — K42.9 UMBILICAL HERNIA WITHOUT OBSTRUCTION AND WITHOUT GANGRENE: Primary | ICD-10-CM

## 2024-02-13 DIAGNOSIS — K42.9 UMBILICAL HERNIA WITHOUT OBSTRUCTION AND WITHOUT GANGRENE: ICD-10-CM

## 2024-02-13 RX ORDER — ACETAMINOPHEN 325 MG/1
1000 TABLET ORAL ONCE
Status: CANCELLED | OUTPATIENT
Start: 2024-02-13 | End: 2024-02-13

## 2024-02-13 RX ORDER — CELECOXIB 200 MG/1
200 CAPSULE ORAL ONCE
Status: CANCELLED | OUTPATIENT
Start: 2024-02-13 | End: 2024-02-13

## 2024-02-13 RX ORDER — SODIUM CHLORIDE 0.9 % (FLUSH) 0.9 %
5-40 SYRINGE (ML) INJECTION PRN
Status: CANCELLED | OUTPATIENT
Start: 2024-02-13

## 2024-02-13 RX ORDER — SODIUM CHLORIDE 9 MG/ML
INJECTION, SOLUTION INTRAVENOUS PRN
Status: CANCELLED | OUTPATIENT
Start: 2024-02-28

## 2024-02-13 RX ORDER — PREGABALIN 25 MG/1
100 CAPSULE ORAL ONCE
Status: CANCELLED | OUTPATIENT
Start: 2024-02-13 | End: 2024-02-13

## 2024-02-13 RX ORDER — SODIUM CHLORIDE 0.9 % (FLUSH) 0.9 %
5-40 SYRINGE (ML) INJECTION EVERY 12 HOURS SCHEDULED
Status: CANCELLED | OUTPATIENT
Start: 2024-02-13

## 2024-02-13 RX ORDER — SODIUM CHLORIDE, SODIUM LACTATE, POTASSIUM CHLORIDE, CALCIUM CHLORIDE 600; 310; 30; 20 MG/100ML; MG/100ML; MG/100ML; MG/100ML
INJECTION, SOLUTION INTRAVENOUS CONTINUOUS
Status: CANCELLED | OUTPATIENT
Start: 2024-02-13

## 2024-02-19 ENCOUNTER — HOSPITAL ENCOUNTER (OUTPATIENT)
Facility: HOSPITAL | Age: 49
Discharge: HOME | End: 2024-02-22
Payer: MEDICAID

## 2024-02-19 VITALS
OXYGEN SATURATION: 100 % | RESPIRATION RATE: 16 BRPM | TEMPERATURE: 97.8 F | HEART RATE: 86 BPM | WEIGHT: 308 LBS | SYSTOLIC BLOOD PRESSURE: 166 MMHG | BODY MASS INDEX: 39.53 KG/M2 | HEIGHT: 74 IN | DIASTOLIC BLOOD PRESSURE: 82 MMHG

## 2024-02-19 LAB
ALBUMIN SERPL-MCNC: 3.6 G/DL (ref 3.5–5)
ALBUMIN/GLOB SERPL: 0.9 (ref 1.1–2.2)
ALP SERPL-CCNC: 89 U/L (ref 45–117)
ALT SERPL-CCNC: 49 U/L (ref 12–78)
ANION GAP SERPL CALC-SCNC: 4 MMOL/L (ref 5–15)
APPEARANCE UR: ABNORMAL
APTT PPP: 33.9 SEC (ref 21.2–34.1)
AST SERPL W P-5'-P-CCNC: 24 U/L (ref 15–37)
BACTERIA URNS QL MICRO: NEGATIVE /HPF
BASOPHILS # BLD: 0 K/UL (ref 0–0.1)
BASOPHILS NFR BLD: 1 % (ref 0–1)
BILIRUB SERPL-MCNC: 0.5 MG/DL (ref 0.2–1)
BILIRUB UR QL: NEGATIVE
BUN SERPL-MCNC: 16 MG/DL (ref 6–20)
BUN/CREAT SERPL: 18 (ref 12–20)
CA-I BLD-MCNC: 8.8 MG/DL (ref 8.5–10.1)
CHLORIDE SERPL-SCNC: 110 MMOL/L (ref 97–108)
CO2 SERPL-SCNC: 25 MMOL/L (ref 21–32)
COLOR UR: ABNORMAL
CREAT SERPL-MCNC: 0.89 MG/DL (ref 0.7–1.3)
DIFFERENTIAL METHOD BLD: ABNORMAL
EOSINOPHIL # BLD: 0.1 K/UL (ref 0–0.4)
EOSINOPHIL NFR BLD: 1 % (ref 0–7)
EPITH CASTS URNS QL MICRO: ABNORMAL /LPF
ERYTHROCYTE [DISTWIDTH] IN BLOOD BY AUTOMATED COUNT: 13.1 % (ref 11.5–14.5)
GLOBULIN SER CALC-MCNC: 4.1 G/DL (ref 2–4)
GLUCOSE SERPL-MCNC: 134 MG/DL (ref 65–100)
GLUCOSE UR STRIP.AUTO-MCNC: NEGATIVE MG/DL
HCT VFR BLD AUTO: 39.8 % (ref 36.6–50.3)
HGB BLD-MCNC: 13.2 G/DL (ref 12.1–17)
HGB UR QL STRIP: ABNORMAL
IMM GRANULOCYTES # BLD AUTO: 0 K/UL (ref 0–0.04)
IMM GRANULOCYTES NFR BLD AUTO: 1 % (ref 0–0.5)
INR PPP: 1 (ref 0.9–1.1)
KETONES UR QL STRIP.AUTO: NEGATIVE MG/DL
LEUKOCYTE ESTERASE UR QL STRIP.AUTO: ABNORMAL
LYMPHOCYTES # BLD: 2 K/UL (ref 0.8–3.5)
LYMPHOCYTES NFR BLD: 33 % (ref 12–49)
MCH RBC QN AUTO: 29.7 PG (ref 26–34)
MCHC RBC AUTO-ENTMCNC: 33.2 G/DL (ref 30–36.5)
MCV RBC AUTO: 89.6 FL (ref 80–99)
MONOCYTES # BLD: 0.4 K/UL (ref 0–1)
MONOCYTES NFR BLD: 6 % (ref 5–13)
NEUTS SEG # BLD: 3.4 K/UL (ref 1.8–8)
NEUTS SEG NFR BLD: 58 % (ref 32–75)
NITRITE UR QL STRIP.AUTO: NEGATIVE
NRBC # BLD: 0 K/UL (ref 0–0.01)
NRBC BLD-RTO: 0 PER 100 WBC
PH UR STRIP: 6
PLATELET # BLD AUTO: 215 K/UL (ref 150–400)
PMV BLD AUTO: 9.4 FL (ref 8.9–12.9)
POTASSIUM SERPL-SCNC: 3.6 MMOL/L (ref 3.5–5.1)
PROT SERPL-MCNC: 7.7 G/DL (ref 6.4–8.2)
PROT UR STRIP-MCNC: NEGATIVE MG/DL
PROTHROMBIN TIME: 13.3 SEC (ref 11.9–14.6)
RBC # BLD AUTO: 4.44 M/UL (ref 4.1–5.7)
RBC #/AREA URNS HPF: >100 /HPF (ref 0–5)
SODIUM SERPL-SCNC: 139 MMOL/L (ref 136–145)
SP GR UR REFRACTOMETRY: 1.02 (ref 1–1.03)
THERAPEUTIC RANGE: NORMAL SEC (ref 82–109)
URINE CULTURE IF INDICATED: ABNORMAL
UROBILINOGEN UR QL STRIP.AUTO: 0.1 EU/DL (ref 0.2–1)
WBC # BLD AUTO: 5.9 K/UL (ref 4.1–11.1)
WBC URNS QL MICRO: ABNORMAL /HPF (ref 0–4)

## 2024-02-19 PROCEDURE — 36415 COLL VENOUS BLD VENIPUNCTURE: CPT

## 2024-02-19 PROCEDURE — 87086 URINE CULTURE/COLONY COUNT: CPT

## 2024-02-19 PROCEDURE — 81001 URINALYSIS AUTO W/SCOPE: CPT

## 2024-02-19 PROCEDURE — 83036 HEMOGLOBIN GLYCOSYLATED A1C: CPT

## 2024-02-19 PROCEDURE — 85025 COMPLETE CBC W/AUTO DIFF WBC: CPT

## 2024-02-19 PROCEDURE — 85610 PROTHROMBIN TIME: CPT

## 2024-02-19 PROCEDURE — 85730 THROMBOPLASTIN TIME PARTIAL: CPT

## 2024-02-19 PROCEDURE — 80053 COMPREHEN METABOLIC PANEL: CPT

## 2024-02-19 RX ORDER — CIPROFLOXACIN 500 MG/1
500 TABLET, FILM COATED ORAL 2 TIMES DAILY
COMMUNITY

## 2024-02-19 ASSESSMENT — PAIN DESCRIPTION - DESCRIPTORS: DESCRIPTORS: NUMBNESS

## 2024-02-19 ASSESSMENT — PAIN DESCRIPTION - ORIENTATION: ORIENTATION: LEFT;RIGHT

## 2024-02-19 ASSESSMENT — PAIN DESCRIPTION - LOCATION: LOCATION: LEG

## 2024-02-20 LAB
BACTERIA SPEC CULT: NORMAL
EST. AVERAGE GLUCOSE BLD GHB EST-MCNC: 103 MG/DL
HBA1C MFR BLD: 5.2 % (ref 4–5.6)
Lab: NORMAL

## 2024-02-20 NOTE — PERIOP NOTE
Dr. Katz notified patient taking Cipro for current UTI and urine cx collected 2/19/24 is still pending, No new orders received.

## 2024-02-28 ENCOUNTER — HOSPITAL ENCOUNTER (OUTPATIENT)
Facility: HOSPITAL | Age: 49
Setting detail: OUTPATIENT SURGERY
Discharge: HOME | End: 2024-02-28
Attending: SURGERY | Admitting: SURGERY
Payer: MEDICAID

## 2024-02-28 ENCOUNTER — ANESTHESIA EVENT (OUTPATIENT)
Facility: HOSPITAL | Age: 49
End: 2024-02-28
Payer: MEDICAID

## 2024-02-28 ENCOUNTER — ANESTHESIA (OUTPATIENT)
Facility: HOSPITAL | Age: 49
End: 2024-02-28
Payer: MEDICAID

## 2024-02-28 VITALS
RESPIRATION RATE: 18 BRPM | WEIGHT: 300 LBS | OXYGEN SATURATION: 94 % | BODY MASS INDEX: 38.5 KG/M2 | HEART RATE: 84 BPM | SYSTOLIC BLOOD PRESSURE: 127 MMHG | DIASTOLIC BLOOD PRESSURE: 84 MMHG | TEMPERATURE: 98.4 F | HEIGHT: 74 IN

## 2024-02-28 DIAGNOSIS — K42.9 UMBILICAL HERNIA WITHOUT OBSTRUCTION AND WITHOUT GANGRENE: Primary | ICD-10-CM

## 2024-02-28 PROCEDURE — 2709999900 HC NON-CHARGEABLE SUPPLY: Performed by: SURGERY

## 2024-02-28 PROCEDURE — 6360000002 HC RX W HCPCS: Performed by: ANESTHESIOLOGY

## 2024-02-28 PROCEDURE — 6370000000 HC RX 637 (ALT 250 FOR IP): Performed by: ANESTHESIOLOGY

## 2024-02-28 PROCEDURE — 7100000010 HC PHASE II RECOVERY - FIRST 15 MIN: Performed by: SURGERY

## 2024-02-28 PROCEDURE — 2580000003 HC RX 258: Performed by: NURSE PRACTITIONER

## 2024-02-28 PROCEDURE — 2580000003 HC RX 258: Performed by: SURGERY

## 2024-02-28 PROCEDURE — S2900 ROBOTIC SURGICAL SYSTEM: HCPCS | Performed by: SURGERY

## 2024-02-28 PROCEDURE — 3700000000 HC ANESTHESIA ATTENDED CARE: Performed by: SURGERY

## 2024-02-28 PROCEDURE — 3600000019 HC SURGERY ROBOT ADDTL 15MIN: Performed by: SURGERY

## 2024-02-28 PROCEDURE — 2500000003 HC RX 250 WO HCPCS: Performed by: NURSE PRACTITIONER

## 2024-02-28 PROCEDURE — 6370000000 HC RX 637 (ALT 250 FOR IP): Performed by: SURGERY

## 2024-02-28 PROCEDURE — 49592 RPR AA HRN 1ST < 3 NCR/STRN: CPT | Performed by: SURGERY

## 2024-02-28 PROCEDURE — 7100000001 HC PACU RECOVERY - ADDTL 15 MIN: Performed by: SURGERY

## 2024-02-28 PROCEDURE — 6360000002 HC RX W HCPCS: Performed by: SURGERY

## 2024-02-28 PROCEDURE — 6360000002 HC RX W HCPCS: Performed by: NURSE PRACTITIONER

## 2024-02-28 PROCEDURE — 7100000011 HC PHASE II RECOVERY - ADDTL 15 MIN: Performed by: SURGERY

## 2024-02-28 PROCEDURE — 7100000000 HC PACU RECOVERY - FIRST 15 MIN: Performed by: SURGERY

## 2024-02-28 PROCEDURE — 3700000001 HC ADD 15 MINUTES (ANESTHESIA): Performed by: SURGERY

## 2024-02-28 PROCEDURE — 3600000009 HC SURGERY ROBOT BASE: Performed by: SURGERY

## 2024-02-28 PROCEDURE — C1781 MESH (IMPLANTABLE): HCPCS | Performed by: SURGERY

## 2024-02-28 DEVICE — MESH SURG DIA4.5IN CIR SEPRA TECHNOLOGY VENTRALIGHT: Type: IMPLANTABLE DEVICE | Site: ABDOMEN | Status: FUNCTIONAL

## 2024-02-28 RX ORDER — LABETALOL HYDROCHLORIDE 5 MG/ML
10 INJECTION, SOLUTION INTRAVENOUS
Status: DISCONTINUED | OUTPATIENT
Start: 2024-02-28 | End: 2024-02-28 | Stop reason: HOSPADM

## 2024-02-28 RX ORDER — LIDOCAINE HYDROCHLORIDE 20 MG/ML
INJECTION, SOLUTION EPIDURAL; INFILTRATION; INTRACAUDAL; PERINEURAL PRN
Status: DISCONTINUED | OUTPATIENT
Start: 2024-02-28 | End: 2024-02-28 | Stop reason: SDUPTHER

## 2024-02-28 RX ORDER — MIDAZOLAM HYDROCHLORIDE 2 MG/2ML
INJECTION, SOLUTION INTRAMUSCULAR; INTRAVENOUS PRN
Status: DISCONTINUED | OUTPATIENT
Start: 2024-02-28 | End: 2024-02-28 | Stop reason: SDUPTHER

## 2024-02-28 RX ORDER — SODIUM CHLORIDE, SODIUM LACTATE, POTASSIUM CHLORIDE, CALCIUM CHLORIDE 600; 310; 30; 20 MG/100ML; MG/100ML; MG/100ML; MG/100ML
INJECTION, SOLUTION INTRAVENOUS ONCE
Status: DISCONTINUED | OUTPATIENT
Start: 2024-02-28 | End: 2024-02-28 | Stop reason: HOSPADM

## 2024-02-28 RX ORDER — SODIUM CHLORIDE 0.9 % (FLUSH) 0.9 %
5-40 SYRINGE (ML) INJECTION EVERY 12 HOURS SCHEDULED
Status: DISCONTINUED | OUTPATIENT
Start: 2024-02-28 | End: 2024-02-28 | Stop reason: HOSPADM

## 2024-02-28 RX ORDER — ONDANSETRON 2 MG/ML
INJECTION INTRAMUSCULAR; INTRAVENOUS PRN
Status: DISCONTINUED | OUTPATIENT
Start: 2024-02-28 | End: 2024-02-28 | Stop reason: SDUPTHER

## 2024-02-28 RX ORDER — METOCLOPRAMIDE HYDROCHLORIDE 5 MG/ML
10 INJECTION INTRAMUSCULAR; INTRAVENOUS
Status: DISCONTINUED | OUTPATIENT
Start: 2024-02-28 | End: 2024-02-28 | Stop reason: HOSPADM

## 2024-02-28 RX ORDER — MEPERIDINE HYDROCHLORIDE 25 MG/ML
12.5 INJECTION INTRAMUSCULAR; INTRAVENOUS; SUBCUTANEOUS EVERY 5 MIN PRN
Status: DISCONTINUED | OUTPATIENT
Start: 2024-02-28 | End: 2024-02-28 | Stop reason: HOSPADM

## 2024-02-28 RX ORDER — IBUPROFEN 800 MG/1
800 TABLET ORAL 3 TIMES DAILY PRN
Qty: 21 TABLET | Refills: 0 | Status: SHIPPED | OUTPATIENT
Start: 2024-02-28

## 2024-02-28 RX ORDER — IPRATROPIUM BROMIDE AND ALBUTEROL SULFATE 2.5; .5 MG/3ML; MG/3ML
1 SOLUTION RESPIRATORY (INHALATION)
Status: DISCONTINUED | OUTPATIENT
Start: 2024-02-28 | End: 2024-02-28 | Stop reason: HOSPADM

## 2024-02-28 RX ORDER — ROCURONIUM BROMIDE 10 MG/ML
INJECTION, SOLUTION INTRAVENOUS PRN
Status: DISCONTINUED | OUTPATIENT
Start: 2024-02-28 | End: 2024-02-28 | Stop reason: SDUPTHER

## 2024-02-28 RX ORDER — SODIUM CHLORIDE 0.9 % (FLUSH) 0.9 %
5-40 SYRINGE (ML) INJECTION PRN
Status: DISCONTINUED | OUTPATIENT
Start: 2024-02-28 | End: 2024-02-28 | Stop reason: HOSPADM

## 2024-02-28 RX ORDER — KETOROLAC TROMETHAMINE 30 MG/ML
INJECTION, SOLUTION INTRAMUSCULAR; INTRAVENOUS PRN
Status: DISCONTINUED | OUTPATIENT
Start: 2024-02-28 | End: 2024-02-28 | Stop reason: SDUPTHER

## 2024-02-28 RX ORDER — OXYCODONE HYDROCHLORIDE AND ACETAMINOPHEN 5; 325 MG/1; MG/1
1 TABLET ORAL EVERY 6 HOURS PRN
Qty: 12 TABLET | Refills: 0 | Status: SHIPPED | OUTPATIENT
Start: 2024-02-28 | End: 2024-03-02

## 2024-02-28 RX ORDER — FENTANYL CITRATE 50 UG/ML
50 INJECTION, SOLUTION INTRAMUSCULAR; INTRAVENOUS EVERY 5 MIN PRN
Status: DISCONTINUED | OUTPATIENT
Start: 2024-02-28 | End: 2024-02-28 | Stop reason: HOSPADM

## 2024-02-28 RX ORDER — LIDOCAINE 4 G/G
1 PATCH TOPICAL AS NEEDED
Status: DISCONTINUED | OUTPATIENT
Start: 2024-02-28 | End: 2024-02-28 | Stop reason: HOSPADM

## 2024-02-28 RX ORDER — KETAMINE HCL IN NACL, ISO-OSM 100MG/10ML
SYRINGE (ML) INJECTION PRN
Status: DISCONTINUED | OUTPATIENT
Start: 2024-02-28 | End: 2024-02-28 | Stop reason: SDUPTHER

## 2024-02-28 RX ORDER — LORAZEPAM 2 MG/ML
0.5 INJECTION INTRAMUSCULAR
Status: DISCONTINUED | OUTPATIENT
Start: 2024-02-28 | End: 2024-02-28 | Stop reason: HOSPADM

## 2024-02-28 RX ORDER — CELECOXIB 200 MG/1
200 CAPSULE ORAL ONCE
Status: COMPLETED | OUTPATIENT
Start: 2024-02-28 | End: 2024-02-28

## 2024-02-28 RX ORDER — DEXTROSE MONOHYDRATE 100 MG/ML
INJECTION, SOLUTION INTRAVENOUS CONTINUOUS PRN
Status: DISCONTINUED | OUTPATIENT
Start: 2024-02-28 | End: 2024-02-28 | Stop reason: HOSPADM

## 2024-02-28 RX ORDER — HYDROMORPHONE HYDROCHLORIDE 1 MG/ML
0.5 INJECTION, SOLUTION INTRAMUSCULAR; INTRAVENOUS; SUBCUTANEOUS EVERY 5 MIN PRN
Status: DISCONTINUED | OUTPATIENT
Start: 2024-02-28 | End: 2024-02-28 | Stop reason: HOSPADM

## 2024-02-28 RX ORDER — FENTANYL CITRATE 50 UG/ML
INJECTION, SOLUTION INTRAMUSCULAR; INTRAVENOUS PRN
Status: DISCONTINUED | OUTPATIENT
Start: 2024-02-28 | End: 2024-02-28 | Stop reason: SDUPTHER

## 2024-02-28 RX ORDER — DIPHENHYDRAMINE HYDROCHLORIDE 50 MG/ML
12.5 INJECTION INTRAMUSCULAR; INTRAVENOUS
Status: DISCONTINUED | OUTPATIENT
Start: 2024-02-28 | End: 2024-02-28 | Stop reason: HOSPADM

## 2024-02-28 RX ORDER — DEXAMETHASONE SODIUM PHOSPHATE 4 MG/ML
INJECTION, SOLUTION INTRA-ARTICULAR; INTRALESIONAL; INTRAMUSCULAR; INTRAVENOUS; SOFT TISSUE PRN
Status: DISCONTINUED | OUTPATIENT
Start: 2024-02-28 | End: 2024-02-28 | Stop reason: SDUPTHER

## 2024-02-28 RX ORDER — BUPIVACAINE HYDROCHLORIDE 2.5 MG/ML
INJECTION, SOLUTION EPIDURAL; INFILTRATION; INTRACAUDAL PRN
Status: DISCONTINUED | OUTPATIENT
Start: 2024-02-28 | End: 2024-02-28 | Stop reason: ALTCHOICE

## 2024-02-28 RX ORDER — SODIUM CHLORIDE, SODIUM LACTATE, POTASSIUM CHLORIDE, CALCIUM CHLORIDE 600; 310; 30; 20 MG/100ML; MG/100ML; MG/100ML; MG/100ML
INJECTION, SOLUTION INTRAVENOUS CONTINUOUS
Status: DISCONTINUED | OUTPATIENT
Start: 2024-02-28 | End: 2024-02-28 | Stop reason: HOSPADM

## 2024-02-28 RX ORDER — SODIUM CHLORIDE 9 MG/ML
INJECTION, SOLUTION INTRAVENOUS PRN
Status: DISCONTINUED | OUTPATIENT
Start: 2024-02-28 | End: 2024-02-28 | Stop reason: HOSPADM

## 2024-02-28 RX ORDER — SODIUM CHLORIDE, SODIUM LACTATE, POTASSIUM CHLORIDE, CALCIUM CHLORIDE 600; 310; 30; 20 MG/100ML; MG/100ML; MG/100ML; MG/100ML
INJECTION, SOLUTION INTRAVENOUS CONTINUOUS PRN
Status: DISCONTINUED | OUTPATIENT
Start: 2024-02-28 | End: 2024-02-28 | Stop reason: SDUPTHER

## 2024-02-28 RX ORDER — ONDANSETRON 2 MG/ML
4 INJECTION INTRAMUSCULAR; INTRAVENOUS
Status: DISCONTINUED | OUTPATIENT
Start: 2024-02-28 | End: 2024-02-28 | Stop reason: HOSPADM

## 2024-02-28 RX ORDER — PHENYLEPHRINE HCL IN 0.9% NACL 1 MG/10 ML
SYRINGE (ML) INTRAVENOUS PRN
Status: DISCONTINUED | OUTPATIENT
Start: 2024-02-28 | End: 2024-02-28 | Stop reason: SDUPTHER

## 2024-02-28 RX ORDER — SUCCINYLCHOLINE/SOD CL,ISO/PF 200MG/10ML
SYRINGE (ML) INTRAVENOUS PRN
Status: DISCONTINUED | OUTPATIENT
Start: 2024-02-28 | End: 2024-02-28 | Stop reason: SDUPTHER

## 2024-02-28 RX ORDER — OXYCODONE HYDROCHLORIDE 5 MG/1
5 TABLET ORAL PRN
Status: COMPLETED | OUTPATIENT
Start: 2024-02-28 | End: 2024-02-28

## 2024-02-28 RX ORDER — PREGABALIN 50 MG/1
100 CAPSULE ORAL ONCE
Status: COMPLETED | OUTPATIENT
Start: 2024-02-28 | End: 2024-02-28

## 2024-02-28 RX ORDER — HYDRALAZINE HYDROCHLORIDE 20 MG/ML
10 INJECTION INTRAMUSCULAR; INTRAVENOUS
Status: DISCONTINUED | OUTPATIENT
Start: 2024-02-28 | End: 2024-02-28 | Stop reason: HOSPADM

## 2024-02-28 RX ORDER — ACETAMINOPHEN 500 MG
1000 TABLET ORAL ONCE
Status: COMPLETED | OUTPATIENT
Start: 2024-02-28 | End: 2024-02-28

## 2024-02-28 RX ORDER — PROPOFOL 10 MG/ML
INJECTION, EMULSION INTRAVENOUS PRN
Status: DISCONTINUED | OUTPATIENT
Start: 2024-02-28 | End: 2024-02-28 | Stop reason: SDUPTHER

## 2024-02-28 RX ORDER — OXYCODONE HYDROCHLORIDE 5 MG/1
10 TABLET ORAL PRN
Status: COMPLETED | OUTPATIENT
Start: 2024-02-28 | End: 2024-02-28

## 2024-02-28 RX ADMIN — LIDOCAINE HYDROCHLORIDE 100 MG: 20 INJECTION, SOLUTION EPIDURAL; INFILTRATION; INTRACAUDAL; PERINEURAL at 12:56

## 2024-02-28 RX ADMIN — PROPOFOL 50 MG: 10 INJECTION, EMULSION INTRAVENOUS at 13:01

## 2024-02-28 RX ADMIN — Medication 200 MCG: at 13:21

## 2024-02-28 RX ADMIN — SODIUM CHLORIDE, POTASSIUM CHLORIDE, SODIUM LACTATE AND CALCIUM CHLORIDE: 600; 310; 30; 20 INJECTION, SOLUTION INTRAVENOUS at 12:06

## 2024-02-28 RX ADMIN — FENTANYL CITRATE 50 MCG: 50 INJECTION, SOLUTION INTRAMUSCULAR; INTRAVENOUS at 12:59

## 2024-02-28 RX ADMIN — ACETAMINOPHEN 1000 MG: 500 TABLET ORAL at 12:09

## 2024-02-28 RX ADMIN — PREGABALIN 100 MG: 50 CAPSULE ORAL at 12:09

## 2024-02-28 RX ADMIN — KETOROLAC TROMETHAMINE 30 MG: 30 INJECTION, SOLUTION INTRAMUSCULAR at 14:12

## 2024-02-28 RX ADMIN — ONDANSETRON 4 MG: 2 INJECTION INTRAMUSCULAR; INTRAVENOUS at 12:59

## 2024-02-28 RX ADMIN — DEXAMETHASONE SODIUM PHOSPHATE 4 MG: 4 INJECTION, SOLUTION INTRAMUSCULAR; INTRAVENOUS at 12:59

## 2024-02-28 RX ADMIN — Medication 25 MG: at 12:56

## 2024-02-28 RX ADMIN — Medication 25 MG: at 12:59

## 2024-02-28 RX ADMIN — Medication 160 MG: at 12:58

## 2024-02-28 RX ADMIN — CEFAZOLIN SODIUM 3000 MG: 1 INJECTION, POWDER, FOR SOLUTION INTRAMUSCULAR; INTRAVENOUS at 12:50

## 2024-02-28 RX ADMIN — ROCURONIUM BROMIDE 50 MG: 10 INJECTION, SOLUTION INTRAVENOUS at 13:05

## 2024-02-28 RX ADMIN — FENTANYL CITRATE 50 MCG: 50 INJECTION, SOLUTION INTRAMUSCULAR; INTRAVENOUS at 14:54

## 2024-02-28 RX ADMIN — FENTANYL CITRATE 50 MCG: 50 INJECTION, SOLUTION INTRAMUSCULAR; INTRAVENOUS at 12:56

## 2024-02-28 RX ADMIN — ROCURONIUM BROMIDE 10 MG: 10 INJECTION, SOLUTION INTRAVENOUS at 13:59

## 2024-02-28 RX ADMIN — OXYCODONE 10 MG: 5 TABLET ORAL at 15:19

## 2024-02-28 RX ADMIN — MIDAZOLAM HYDROCHLORIDE 2 MG: 1 INJECTION, SOLUTION INTRAMUSCULAR; INTRAVENOUS at 12:54

## 2024-02-28 RX ADMIN — CELECOXIB 200 MG: 200 CAPSULE ORAL at 12:09

## 2024-02-28 RX ADMIN — PROPOFOL 150 MG: 10 INJECTION, EMULSION INTRAVENOUS at 12:57

## 2024-02-28 RX ADMIN — SUGAMMADEX 200 MG: 100 INJECTION, SOLUTION INTRAVENOUS at 14:20

## 2024-02-28 RX ADMIN — SODIUM CHLORIDE, POTASSIUM CHLORIDE, SODIUM LACTATE AND CALCIUM CHLORIDE: 600; 310; 30; 20 INJECTION, SOLUTION INTRAVENOUS at 12:54

## 2024-02-28 ASSESSMENT — PAIN - FUNCTIONAL ASSESSMENT
PAIN_FUNCTIONAL_ASSESSMENT: WONG-BAKER FACES
PAIN_FUNCTIONAL_ASSESSMENT: 0-10

## 2024-02-28 ASSESSMENT — PAIN SCALES - GENERAL
PAINLEVEL_OUTOF10: 10
PAINLEVEL_OUTOF10: 0
PAINLEVEL_OUTOF10: 6

## 2024-02-28 ASSESSMENT — PAIN DESCRIPTION - DESCRIPTORS
DESCRIPTORS: BURNING
DESCRIPTORS: DISCOMFORT

## 2024-02-28 ASSESSMENT — PAIN DESCRIPTION - LOCATION
LOCATION: ABDOMEN
LOCATION: ABDOMEN

## 2024-02-28 ASSESSMENT — PAIN DESCRIPTION - ORIENTATION
ORIENTATION: ANTERIOR
ORIENTATION: LOWER

## 2024-02-28 NOTE — OP NOTE
Operative Note      Patient: Tevin Velazquez  YOB: 1975  MRN: [id number]    Date of Procedure: 2/28/2024    Pre-Op Diagnosis Codes:     * Umbilical hernia [K42.9]    Post-Op Diagnosis: Same       Procedure(s):  ROBOTIC ASSISTED REPAIR OF UMBILICAL HERNIA WITH MESH    Surgeon(s):  Annie Katz DO    Assistant:   Surgical Assistant: Angelica Weldon    Anesthesia: General    Estimated Blood Loss (mL): Minimal    Complications: None    Specimens:   * No specimens in log *    Implants:  Implant Name Type Inv. Item Serial No.  Lot No. LRB No. Used Action   MESH SURG DIA4.5IN CIR [employer] VENTRALIGHT - SNA  MESH SURG DIA4.5IN CIR [employer] NA BARD DAVOL-WD [id number] N/A 1 Implanted         Drains: * No LDAs found *    Findings: umbilical hernia with incarcerated omentum and preperitoneal fat, defect measured 2 cm x 2 cm    Indications for Procedure: Patient is a 48 y.o. male who was diagnosed with a umbilical  hernia.  He was offered robotic-assisted repair of umbilical hernia with mesh. Risks and benefits of the procedure were explained to the patient in detail. These are including but not limited to bleeding, infection, damage to surrounding structures, need for further surgery, risk of anesthesia.  Patient is agreeable and wishes to proceed.    Details of Procedure: The patient was brought back to the operating room and placed in a supine position on the operating room table with arms tucked. SCDs were placed and preoperative antibiotics were given. General anesthesia was then performed by the department of anesthesia.  The abdomen was prepped and draped in the typical sterile fashion. A time out was performed confirming correct patient, correct procedure; all present were in agreement.  A small stab incision was made in the left upper quadrant and a Veress needle was inserted. The abdomen was then insufflated to 15 mmHg which was well tolerated.  An 8 mm incision was

## 2024-02-28 NOTE — ANESTHESIA PRE PROCEDURE
03:05 PM    K 3.6 02/19/2024 03:05 PM     02/19/2024 03:05 PM    CO2 25 02/19/2024 03:05 PM    BUN 16 02/19/2024 03:05 PM    CREATININE 0.89 02/19/2024 03:05 PM    AGRATIO 0.9 02/19/2024 03:05 PM    LABGLOM >60 02/19/2024 03:05 PM    GLUCOSE 134 02/19/2024 03:05 PM    PROT 7.7 02/19/2024 03:05 PM    CALCIUM 8.8 02/19/2024 03:05 PM    BILITOT 0.5 02/19/2024 03:05 PM    ALKPHOS 89 02/19/2024 03:05 PM    AST 24 02/19/2024 03:05 PM    ALT 49 02/19/2024 03:05 PM       POC Tests:   No results for input(s): \"POCGLU\", \"POCNA\", \"POCK\", \"POCCL\", \"POCBUN\", \"POCHEMO\", \"POCHCT\" in the last 72 hours.      Coags:   Lab Results   Component Value Date/Time    PROTIME 13.3 02/19/2024 03:05 PM    INR 1.0 02/19/2024 03:05 PM    APTT 33.9 02/19/2024 03:05 PM       HCG (If Applicable): No results found for: \"PREGTESTUR\", \"PREGSERUM\", \"HCG\", \"HCGQUANT\"     ABGs:   Lab Results   Component Value Date/Time    PHART 7.46 10/19/2023 04:36 AM    PO2ART 79 10/19/2023 04:36 AM    TJY1ILN 40 10/19/2023 04:36 AM    BAS8WDO 28 10/19/2023 04:36 AM    BEART 3.9 10/19/2023 04:36 AM    W9YZRPIW 96 10/19/2023 04:36 AM        Type & Screen (If Applicable):  No results found for: \"LABABO\", \"LABRH\"    Drug/Infectious Status (If Applicable):  No results found for: \"HIV\", \"HEPCAB\"    COVID-19 Screening (If Applicable):   Lab Results   Component Value Date/Time    COVID19 Not Detected 10/13/2023 04:30 PM           Anesthesia Evaluation  Patient summary reviewed and Nursing notes reviewed  Airway: Mallampati: II  TM distance: >3 FB   Neck ROM: full  Mouth opening: > = 3 FB   Dental: normal exam         Pulmonary:normal exam  breath sounds clear to auscultation                             Cardiovascular:Negative CV ROS          ECG reviewed  Rhythm: regular  Rate: normal                    Neuro/Psych:   Negative Neuro/Psych ROS              GI/Hepatic/Renal:   (+) morbid obesity          Endo/Other:    (+) malignancy/cancer.                  ROS comment:

## 2024-02-28 NOTE — PROGRESS NOTES
Spiritual Care Assessment/Progress Note  [facility]    Name: Tevin Velazquez MRN: [id number]    Age: 48 y.o.     Sex: male   Language: English     Date: 2/28/2024            Total Time Calculated: 55 min              Spiritual Assessment begun in [facility]  Service Provided For:: Patient and family together  Referral/Consult From:: Nurse  Encounter Overview/Reason : Initial Encounter    Spiritual beliefs:      [x] Involved in a tracy tradition/spiritual practice:      [] Supported by a tracy community:      [] Claims no spiritual orientation:      [] Seeking spiritual identity:           [] Adheres to an individual form of spirituality:      [] Not able to assess:                Identified resources for coping and support system:   Support System: Parent       [x] Prayer                  [] Devotional reading               [] Music                  [] Guided Imagery     [] Pet visits                                        [] Other: (COMMENT)     Specific area/focus of visit   Encounter:    Crisis:    Spiritual/Emotional needs: Type: Spiritual Support  Ritual, Rites and Sacraments:    Grief, Loss, and Adjustments:    Ethics/Mediation:    Behavioral Health:    Palliative Care:    Advance Care Planning:      Plan/Referrals: Other (Comment) ( is available if needed)    Narrative:  responded to a consult for pt [facility]evin Velazquez in [facility]. Pt is present and receptive to spiritual health visit. Pt's mother joins. Pt is reflective with  regarding previous recent experience in this hospital that causes fear and worry for today. Pt shares of ultimate positive outlook yet he continues his health and previous experience seriously. Pt reflects on lifestyle of positive thinking and actions toward others. Pt shares his appreciation for clear communication from team. Members of pt's medical team join visit. Pt receives reassurance and expresses understanding of upcoming procedure and difference

## 2024-02-28 NOTE — DISCHARGE INSTRUCTIONS
Discharge Instructions for General Surgery Patients       Do not lift any objects weighing more than 20 pounds for 6 weeks.    Do not do any housework including vacuuming, scrubbing or yardwork for 6 weeks.    Do not drive or operate machinery while taking sedating or narcotic medications.     Post operative pain is expected. Try to stop taking narcotics pain medication as soon as able and take tylenol or NSAIDS (ibuprofen, Aleve, Advil, etc). Do not take Tylenol with Norco or Percocet as this may harm your liver. No NSAIDS if you are bariatric surgery patient.    You may walk as desired and go up and down stairs as needed. Walking is encouraged.    You may shower the day after surgery. Do not take tub baths, swim or use hot tubs for 2 weeks. Pat dry wounds after with a towel.    Leave glue on wounds. It will fall off with time. Do not scrub around incisions. If redness develops around the glue okay to peel off in hot shower.    Remove the clear dressing at your bellybutton after 1 week. You may shower with it on.    Wear abdominal binder daily when up and moving around, may remove when resting or sleeping.    Regular diet. Take Miralax once or twice a day as needed for constipation. You may also use over the counter stool softeners if needed.    Follow up with provider as scheduled.    If you experience fever (greater than 101.5), chills, vomiting or redness or drainage at surgical site, please contact your surgeon’s office.    If you have further questions or concerns, please call your surgeon’s office at [phone].

## 2024-02-28 NOTE — BRIEF OP NOTE
Brief Postoperative Note      Patient: Tevin Velazquez  YOB: 1975  MRN: [id number]    Date of Procedure: 2/28/2024    Pre-Op Diagnosis Codes:     * Umbilical hernia [K42.9]    Post-Op Diagnosis: Same       Procedure(s):  ROBOTIC ASSISTED REPAIR OF UMBILICAL HERNIA WITH MESH    Surgeon(s):  Annie Katz DO    Assistant:  Surgical Assistant: Angelica Weldon    Anesthesia: General    Estimated Blood Loss (mL): Minimal    Complications: None    Specimens:   * No specimens in log *    Implants:  Implant Name Type Inv. Item Serial No.  Lot No. LRB No. Used Action   MESH SURG DIA4.5IN CIR [employer] VENTRALIGHT - SNA  MESH SURG DIA4.5IN CIR [employer] VENTR[employer] NA BARD DAVOL-WD [id number] N/A 1 Implanted         Drains: * No LDAs found *    Findings: umbilical hernia with incarcerated omentum and preperitoneal fat, defect measured 2 cm x 2 cm      Electronically signed by Annie Katz DO on 2/28/2024 at 2:14 PM

## 2024-02-28 NOTE — ANESTHESIA POSTPROCEDURE EVALUATION
Department of Anesthesiology  Postprocedure Note    Patient: Tevin Velazquez  MRN: [id number]  YOB: 1975  Date of evaluation: 2/28/2024    Procedure Summary       Date: 02/28/24 Room / Location: [facility] MAIN OR 06 / [facility]    Anesthesia Start: 1254 Anesthesia Stop: 1436    Procedure: ROBOTIC ASSISTED REPAIR OF UMBILICAL HERNIA WITH MESH Diagnosis:       Umbilical hernia      (Umbilical hernia [K42.9])    Surgeons: Annie Katz DO Responsible Provider: Jose Cope Jr., MD    Anesthesia Type: general ASA Status: 4            Anesthesia Type: No value filed.    Lul Phase I: Lul Score: 9    Lul Phase II:      Anesthesia Post Evaluation    Patient location during evaluation: PACU  Patient participation: complete - patient participated  Level of consciousness: awake  Pain score: 0  Airway patency: patent  Nausea & Vomiting: no nausea and no vomiting  Cardiovascular status: hemodynamically stable  Respiratory status: acceptable  Hydration status: stable  Multimodal analgesia pain management approach        No notable events documented.

## 2024-03-11 ENCOUNTER — OFFICE VISIT (OUTPATIENT)
Age: 49
End: 2024-03-11
Payer: MEDICAID

## 2024-03-11 VITALS
OXYGEN SATURATION: 97 % | RESPIRATION RATE: 17 BRPM | DIASTOLIC BLOOD PRESSURE: 83 MMHG | HEART RATE: 86 BPM | SYSTOLIC BLOOD PRESSURE: 127 MMHG | TEMPERATURE: 97.5 F | BODY MASS INDEX: 39.37 KG/M2 | HEIGHT: 74 IN | WEIGHT: 306.8 LBS

## 2024-03-11 DIAGNOSIS — Z09 POSTOPERATIVE EXAMINATION: Primary | ICD-10-CM

## 2024-03-11 DIAGNOSIS — Z87.19 S/P REPAIR OF VENTRAL HERNIA: ICD-10-CM

## 2024-03-11 DIAGNOSIS — Z98.890 S/P REPAIR OF VENTRAL HERNIA: ICD-10-CM

## 2024-03-11 PROCEDURE — 99213 OFFICE O/P EST LOW 20 MIN: CPT | Performed by: SURGERY

## 2024-03-11 ASSESSMENT — ENCOUNTER SYMPTOMS
WHEEZING: 0
ABDOMINAL PAIN: 0
COUGH: 0
NAUSEA: 0
SORE THROAT: 0
SHORTNESS OF BREATH: 0
VOMITING: 0
EYE REDNESS: 0
BACK PAIN: 0

## 2024-03-11 ASSESSMENT — PATIENT HEALTH QUESTIONNAIRE - PHQ9
1. LITTLE INTEREST OR PLEASURE IN DOING THINGS: 0
SUM OF ALL RESPONSES TO PHQ9 QUESTIONS 1 & 2: 0
SUM OF ALL RESPONSES TO PHQ QUESTIONS 1-9: 0
2. FEELING DOWN, DEPRESSED OR HOPELESS: 0
SUM OF ALL RESPONSES TO PHQ QUESTIONS 1-9: 0

## 2024-03-11 NOTE — PROGRESS NOTES
Identified pt with two pt identifiers (name and ). Reviewed chart in preparation for visit and have obtained necessary documentation.    Tevin Velazquez is a 48 y.o. male  Chief Complaint   Patient presents with    Post-Op Check     Robotic assisted repair of umbilical hernia     /83 (Site: Left Upper Arm, Position: Sitting, Cuff Size: Large Adult)   Pulse 86   Temp 97.5 °F (36.4 °C) (Temporal)   Resp 17   Ht 1.88 m (6' 2\")   Wt (!) 139.2 kg (306 lb 12.8 oz)   SpO2 97%   BMI 39.39 kg/m²     1. Have you been to the ER, urgent care clinic since your last visit?  Hospitalized since your last visit?no    2. Have you seen or consulted any other health care providers outside of [facility] since your last visit?  Include any pap smears or colon screening. no  
of ventral hernia              Doing well postoperatively.    Plan:     1. Continue any current medications.  2. Wound care discussed.  3. No heavy lifting for 4-6 weeks postop, may start light activities.  4. Wear abdominal binder as needed for comfort.  4. Return to office as needed, return to work in 4 weeks.

## 2024-03-18 ENCOUNTER — TELEPHONE (OUTPATIENT)
Age: 49
End: 2024-03-18

## 2024-03-18 NOTE — TELEPHONE ENCOUNTER
[facility] P:[phone] F:[phone]    Please fax office note from 3/11/24

## 2024-04-11 ENCOUNTER — TELEPHONE (OUTPATIENT)
Age: 49
End: 2024-04-11

## 2024-04-11 NOTE — TELEPHONE ENCOUNTER
Patient called today regarding work note. He needs a note stating he can go back to work with no restrictions. Please fax to [phone] with attention Sharmila Overton.

## 2025-08-24 ENCOUNTER — HOSPITAL ENCOUNTER (EMERGENCY)
Facility: HOSPITAL | Age: 50
Discharge: HOME | End: 2025-08-24
Attending: STUDENT IN AN ORGANIZED HEALTH CARE EDUCATION/TRAINING PROGRAM
Payer: MEDICAID

## 2025-08-24 ENCOUNTER — APPOINTMENT (OUTPATIENT)
Facility: HOSPITAL | Age: 50
End: 2025-08-24
Payer: MEDICAID

## 2025-08-24 VITALS
RESPIRATION RATE: 18 BRPM | HEART RATE: 88 BPM | BODY MASS INDEX: 32.73 KG/M2 | WEIGHT: 255 LBS | HEIGHT: 74 IN | SYSTOLIC BLOOD PRESSURE: 134 MMHG | TEMPERATURE: 98 F | DIASTOLIC BLOOD PRESSURE: 83 MMHG | OXYGEN SATURATION: 97 %

## 2025-08-24 DIAGNOSIS — S92.912A CLOSED NONDISPLACED FRACTURE OF PHALANX OF TOE OF LEFT FOOT, UNSPECIFIED TOE, INITIAL ENCOUNTER: Primary | ICD-10-CM

## 2025-08-24 PROCEDURE — 96372 THER/PROPH/DIAG INJ SC/IM: CPT

## 2025-08-24 PROCEDURE — 73630 X-RAY EXAM OF FOOT: CPT

## 2025-08-24 PROCEDURE — 99284 EMERGENCY DEPT VISIT MOD MDM: CPT

## 2025-08-24 PROCEDURE — 6360000002 HC RX W HCPCS: Performed by: STUDENT IN AN ORGANIZED HEALTH CARE EDUCATION/TRAINING PROGRAM

## 2025-08-24 RX ORDER — KETOROLAC TROMETHAMINE 10 MG/1
10 TABLET, FILM COATED ORAL EVERY 6 HOURS PRN
Qty: 20 TABLET | Refills: 0 | Status: SHIPPED | OUTPATIENT
Start: 2025-08-24 | End: 2025-08-29

## 2025-08-24 RX ORDER — KETOROLAC TROMETHAMINE 15 MG/ML
15 INJECTION, SOLUTION INTRAMUSCULAR; INTRAVENOUS ONCE
Status: COMPLETED | OUTPATIENT
Start: 2025-08-24 | End: 2025-08-24

## 2025-08-24 RX ORDER — KETOROLAC TROMETHAMINE 15 MG/ML
15 INJECTION, SOLUTION INTRAMUSCULAR; INTRAVENOUS
Status: DISCONTINUED | OUTPATIENT
Start: 2025-08-24 | End: 2025-08-24

## 2025-08-24 RX ADMIN — KETOROLAC TROMETHAMINE 15 MG: 15 INJECTION, SOLUTION INTRAMUSCULAR; INTRAVENOUS at 16:50

## 2025-08-24 ASSESSMENT — LIFESTYLE VARIABLES
HOW MANY STANDARD DRINKS CONTAINING ALCOHOL DO YOU HAVE ON A TYPICAL DAY: PATIENT DOES NOT DRINK
HOW OFTEN DO YOU HAVE A DRINK CONTAINING ALCOHOL: NEVER

## 2025-08-24 ASSESSMENT — PAIN SCALES - GENERAL
PAINLEVEL_OUTOF10: 6
PAINLEVEL_OUTOF10: 7

## 2025-08-24 ASSESSMENT — PAIN - FUNCTIONAL ASSESSMENT: PAIN_FUNCTIONAL_ASSESSMENT: 0-10

## (undated) DEVICE — ELECTRODE PT RET AD L9FT HI MOIST COND ADH HYDRGEL CORDED

## (undated) DEVICE — SUTURE STRATAFIX SYMMETRIC PDS + SZ 0 L18IN ABSRB L36MM SXPP1A401

## (undated) DEVICE — GLOVE SURG SZ 65 L12IN FNGR THK79MIL GRN LTX FREE

## (undated) DEVICE — GARMENT,MEDLINE,DVT,INT,CALF,MED, GEN2: Brand: MEDLINE

## (undated) DEVICE — HYPODERMIC SAFETY NEEDLE: Brand: MONOJECT

## (undated) DEVICE — LIQUIBAND RAPID ADHESIVE 36/CS 0.8ML: Brand: MEDLINE

## (undated) DEVICE — HF-ELECTRODE, BUTTON, 5 FR., FLEXIBLE: Brand: OLYMPUS

## (undated) DEVICE — BLADE,CARBON-STEEL,15,STRL,DISPOSABLE,TB: Brand: MEDLINE

## (undated) DEVICE — DRAPE CLAMP,DISPOSABLE: Brand: DEVON

## (undated) DEVICE — BINDER ABD H12IN COT FOR 45-62IN WAIST UNIV PREM 4 PNL DSGN

## (undated) DEVICE — BASIC SINGLE BASIN-LF: Brand: MEDLINE INDUSTRIES, INC.

## (undated) DEVICE — TIP COVER ACCESSORY

## (undated) DEVICE — SOLUTION SCRB 4OZ 4% CHG H2O AIDED FOR PREOPERATIVE SKIN

## (undated) DEVICE — SYRINGE,TOOMEY,IRRIGATION,70CC,STERILE: Brand: MEDLINE

## (undated) DEVICE — SYRINGE MED 10ML LUERLOCK TIP W/O SFTY DISP

## (undated) DEVICE — SUTURE V-LOC 180 SZ 0 L12IN ABSRB GRN L37MM GS-21 1/2 CIR VLOCL0316

## (undated) DEVICE — PLUG,CATHETER,DRAINAGE PROTECTOR,TUBE: Brand: MEDLINE

## (undated) DEVICE — TUBING INSUFFLATOR HEAT HUMIDIFIED SMK EVAC SET PNEUMOCLEAR

## (undated) DEVICE — SOLUTION IRRIG 500ML 0.9% SOD CHLO USP POUR PLAS BTL

## (undated) DEVICE — BLADELESS OBTURATOR: Brand: WECK VISTA

## (undated) DEVICE — GLOVE ORANGE PI 7 1/2   MSG9075

## (undated) DEVICE — SOLUTION IRRIG 1000ML STRL H2O USP PLAS POUR BTL

## (undated) DEVICE — SOLUTION IRRIG 3000ML 0.9% SOD CHL USP UROMATIC PLAS CONT

## (undated) DEVICE — COLUMN DRAPE

## (undated) DEVICE — SOUTHSIDE TURNOVER: Brand: MEDLINE INDUSTRIES, INC.

## (undated) DEVICE — TROCAR: Brand: KII FIOS FIRST ENTRY

## (undated) DEVICE — COVER,MAYO STAND,STERILE: Brand: MEDLINE

## (undated) DEVICE — PREP PAD BNS: Brand: CONVERTORS

## (undated) DEVICE — ULNAR NERVE PROTECTOR FOAM POSITIONER: Brand: CARDINAL HEALTH

## (undated) DEVICE — CORD LAPAROSCOPIC MONOPOLAR

## (undated) DEVICE — CYSTO-SMH: Brand: MEDLINE INDUSTRIES, INC.

## (undated) DEVICE — TUBING, SUCTION, 1/4" X 12', STRAIGHT: Brand: MEDLINE

## (undated) DEVICE — CUTTING ELECTRODE MONO 26FR 12/30°  FOR RESECTOSCOPES, TELESCOPE Ø 4 MM, FOR SHEATHS, INTERMITTENT IRRIGATION, 26 FR, LOOP: ROUND, WIRE Ø 0.35 MM, FORK COLOR BLACK, STEM COLOR TRANSPARENT, PACK = 10 PCS, FOR SHARK RESECTOSCOPE, STERILE, FOR SINGLE USE: Brand: SHARK

## (undated) DEVICE — CATHETER URETH 24FR BLLN 5CC LTX 3 W F SPEC SHT RND TIP TWO

## (undated) DEVICE — SOLUTION GL 1.5% IN H2O 3000ML

## (undated) DEVICE — CONTAINER,SPECIMEN,PNEU TUBE,4OZ,OR STRL: Brand: MEDLINE

## (undated) DEVICE — BAG,DRAINAGE,ANTI-REFLUX TOWER,2000ML: Brand: MEDLINE

## (undated) DEVICE — SYRINGE MED 30ML STD CLR PLAS LUERLOCK TIP N CTRL DISP

## (undated) DEVICE — GLOVE SURG SZ 65 THK91MIL LTX FREE SYN POLYISOPRENE

## (undated) DEVICE — SUTURE VLOC ABSORBABLE VL12V 30 VLOCM0614

## (undated) DEVICE — LAPAROSCOPIC CHOLE PACK: Brand: MEDLINE INDUSTRIES, INC.

## (undated) DEVICE — CUTTING ELECTRODE MONO 24FR 12/30°  FOR RESECTOSCOPES, TELESCOPE Ø 4 MM, FOR SHEATHS, INTERMITTENT/CONTINUOUS IRRIGATION, 24/26 FR, LOOP: ROUND, WIRE Ø 0.25 MM, FORK COLOR YELLOW, STEM COLOR TRANSPARENT, PACK = 10 PCS, FOR SHARK RESECTOSCOPE, STERILE, FOR SINGLE USE: Brand: SHARK

## (undated) DEVICE — SEAL

## (undated) DEVICE — CUTTING ELECTRODE BIPO 26FR 12/30°  FOR RESECTOSCOPES, TELESCOPE Ø 4 MM, FOR SHEATHS, INTERMITTENT IRRIGATION, 26 FR, LOOP: ROUND, WIRE Ø 0.3 MM, FORK COLOR GREEN, STEM COLOR BLUE, PACK=3 PCS, FOR SHARK AND S-LINE RESECTOSCOPES, STERILE, FOR SINGLE USE: Brand: SHARK/S-LINE

## (undated) DEVICE — SURGICAL PROCEDURE PACK CYSTO CHS

## (undated) DEVICE — DRAINBAG,ANTI-REFLUX TOWER,L/F,2000ML,LL: Brand: MEDLINE

## (undated) DEVICE — SUTURE MCRYL + SZ 4-0 L27IN ABSRB UD L19MM PS-2 3/8 CIR MCP426H

## (undated) DEVICE — NEEDLE SPNL 20GA L3.5IN YEL HUB S STL REG WALL FIT STYL W/

## (undated) DEVICE — APPLICATOR MEDICATED 26 CC SOLUTION HI LT ORNG CHLORAPREP

## (undated) DEVICE — SET,IRRIGATION,CYSTO,Y-TYPE,90": Brand: MEDLINE

## (undated) DEVICE — ARM DRAPE

## (undated) DEVICE — CATHETER URETH 22FR 5CC BLLN AMB F 2 W SPEC INF CTRL M OLV

## (undated) DEVICE — SUTURE VCRL + 3-0 VLT BRN SH NDL VC316H